# Patient Record
Sex: FEMALE | Race: WHITE | Employment: FULL TIME | URBAN - METROPOLITAN AREA
[De-identification: names, ages, dates, MRNs, and addresses within clinical notes are randomized per-mention and may not be internally consistent; named-entity substitution may affect disease eponyms.]

---

## 2017-01-30 ENCOUNTER — CLINICAL SUPPORT (OUTPATIENT)
Dept: SURGERY | Age: 37
End: 2017-01-30

## 2017-01-30 ENCOUNTER — OFFICE VISIT (OUTPATIENT)
Dept: CARDIOLOGY CLINIC | Age: 37
End: 2017-01-30

## 2017-01-30 VITALS
OXYGEN SATURATION: 98 % | HEART RATE: 76 BPM | BODY MASS INDEX: 43.4 KG/M2 | SYSTOLIC BLOOD PRESSURE: 128 MMHG | RESPIRATION RATE: 16 BRPM | WEIGHT: 293 LBS | HEIGHT: 69 IN | DIASTOLIC BLOOD PRESSURE: 80 MMHG

## 2017-01-30 VITALS — WEIGHT: 293 LBS | HEIGHT: 69 IN | BODY MASS INDEX: 43.4 KG/M2

## 2017-01-30 DIAGNOSIS — E66.01 MORBID OBESITY WITH BMI OF 45.0-49.9, ADULT (HCC): Primary | ICD-10-CM

## 2017-01-30 DIAGNOSIS — R00.2 PALPITATIONS: ICD-10-CM

## 2017-01-30 DIAGNOSIS — Z82.49 FAMILY HISTORY OF HYPERTROPHIC CARDIOMYOPATHY: ICD-10-CM

## 2017-01-30 DIAGNOSIS — Z01.810 PREOP CARDIOVASCULAR EXAM: Primary | ICD-10-CM

## 2017-01-30 DIAGNOSIS — J45.20 MILD INTERMITTENT ASTHMA WITHOUT COMPLICATION: ICD-10-CM

## 2017-01-30 DIAGNOSIS — R00.2 PALPITATIONS: Primary | ICD-10-CM

## 2017-01-30 DIAGNOSIS — E66.01 MORBID OBESITY WITH BMI OF 40.0-44.9, ADULT (HCC): ICD-10-CM

## 2017-01-30 DIAGNOSIS — R07.89 OTHER CHEST PAIN: ICD-10-CM

## 2017-01-30 RX ORDER — DEXTROAMPHETAMINE SACCHARATE, AMPHETAMINE ASPARTATE MONOHYDRATE, DEXTROAMPHETAMINE SULFATE AND AMPHETAMINE SULFATE 5; 5; 5; 5 MG/1; MG/1; MG/1; MG/1
20 CAPSULE, EXTENDED RELEASE ORAL 2 TIMES DAILY
Status: ON HOLD | COMMUNITY
End: 2017-05-26 | Stop reason: CLARIF

## 2017-01-30 RX ORDER — MONTELUKAST SODIUM 10 MG/1
10 TABLET ORAL DAILY
COMMUNITY
End: 2017-05-09

## 2017-01-30 NOTE — MR AVS SNAPSHOT
Visit Information Date & Time Provider Department Dept. Phone Encounter #  
 1/30/2017  2:40 PM Willy Bustamante MD [facility] Jeff Friend [phone] [id number] Follow-up Instructions Return if symptoms worsen or fail to improve. Your Appointments 2/27/2017  2:30 PM  
NUTRITION COUNSELING with Murtaza Tsang RD [address] ([facility]) Appt Note: supervised wt loss [address] [facility]  [address] [facility] [address]  
[phone]  
  
   
 [address] [facility] [address] [place] [address] [address] [address] Upcoming Health Maintenance Date Due DTaP/Tdap/Td series (1 - Tdap) 6/13/2001 PAP AKA CERVICAL CYTOLOGY 6/13/2001 INFLUENZA AGE 9 TO ADULT 8/1/2016 Allergies as of 1/30/2017  Review Complete On: 1/30/2017 By: Willy Bustamante MD  
 No Known Allergies Current Immunizations  Never Reviewed No immunizations on file. Not reviewed this visit You Were Diagnosed With   
  
 Codes Comments Preop cardiovascular exam    -  Primary ICD-10-CM: Z01.810 ICD-9-CM: V72.81 Palpitations     ICD-10-CM: R00.2 ICD-9-CM: 785.1 Morbid obesity with BMI of 40.0-44.9, adult (HCC)     ICD-10-CM: E66.01, Z68.41 
ICD-9-CM: 278.01, V85.41 Other chest pain     ICD-10-CM: R07.89 ICD-9-CM: 786.59 Mild intermittent asthma without complication     IZQ-03-MAE: J45.20 ICD-9-CM: 493.90 Vitals BP Pulse Resp Height(growth percentile) Weight(growth percentile) SpO2  
 128/80 (BP 1 Location: Right arm, BP Patient Position: Sitting) 76 16 5' 9\" (1.753 m) 310 lb 3.2 oz (140.7 kg) 98% BMI Smoking Status 45.81 kg/m2 Never Smoker BMI and BSA Data Body Mass Index Body Surface Area 45.81 kg/m 2 2.62 m 2 Your Updated Medication List  
  
   
This list is accurate as of: 1/30/17  3:09 PM.  Always use your most recent med list.  
  
  
  
  
 ADDERALL XR 20 mg XR capsule Generic drug:  amphetamine-dextroamphetamine XR Take 20 mg by mouth every morning.  
  
 multivitamin tablet Commonly known as:  ONE A DAY Take 1 Tab by mouth daily. SINGULAIR 10 mg tablet Generic drug:  montelukast  
Take 10 mg by mouth daily. We Performed the Following AMB POC EKG ROUTINE W/ 12 LEADS, INTER & REP [82767 CPT(R)] Follow-up Instructions Return if symptoms worsen or fail to improve. Introducing [facility] & HEALTH SERVICES! Andrea Maldonado introduces [employer] patient portal. Now you can access parts of your medical record, email your doctor's office, and request medication refills online. 1. In your internet browser, go to [email or web address] 2. Click on the First Time User? Click Here link in the Sign In box. You will see the New Member Sign Up page. 3. Enter your [employer] Access Code exactly as it appears below. You will not need to use this code after youve completed the sign-up process. If you do not sign up before the expiration date, you must request a new code. · [employer] Access Code: [email or web address] Expires: 4/30/2017  3:09 PM 
 
4. Enter the last four digits of your Social Security Number (xxxx) and Date of Birth (mm/dd/yyyy) as indicated and click Submit. You will be taken to the next sign-up page. 5. Create a [employer] ID. This will be your [employer] login ID and cannot be changed, so think of one that is secure and easy to remember. 6. Create a [employer] password. You can change your password at any time. 7. Enter your Password Reset Question and Answer. This can be used at a later time if you forget your password. 8. Enter your e-mail address. You will receive e-mail notification when new information is available in [address] 9. Click Sign Up. You can now view and download portions of your medical record. 10. Click the Download Summary menu link to download a portable copy of your medical information. If you have questions, please visit the Frequently Asked Questions section of the HeyBubblet website. Remember, [employer] is NOT to be used for urgent needs. For medical emergencies, dial 911. Now available from your iPhone and Android! Please provide this summary of care documentation to your next provider. If you have any questions after today's visit, please call [phone].

## 2017-01-30 NOTE — PROGRESS NOTES
HISTORY OF PRESENT ILLNESS  Zahida Baird is a 39 y.o. female     SUMMARY:   Problem List  Date Reviewed: 1/30/2017          Codes Class Noted    Palpitations ICD-10-CM: R00.2  ICD-9-CM: 785.1  1/30/2017        Morbid obesity with BMI of 40.0-44.9, adult [facility]) ICD-10-CM: E66.01, Z68.41  ICD-9-CM: 278.01, V85.41  9/27/2016              Current Outpatient Prescriptions on File Prior to Visit   Medication Sig    multivitamin (ONE A DAY) tablet Take 1 Tab by mouth daily. No current facility-administered medications on file prior to visit. CARDIOLOGY STUDIES TO DATE:  unknown  Chief Complaint   Patient presents with    Pre-op Exam     HPI :  Ms. Trice Rodriguez is a 39year old referred by Dr. Carmelo Middleton for pre-op cardiac evaluation. She has had problems with some palpitations off and on over the years which she has attributed to panic and anxiety, and it sounds like about 10 years ago when she was in [place] she had an event monitor and she may have had an echo, details unknown. She feels lately that her palpitations have been getting worse and more prolonged and often she wakes up at night with this. Once she calms down it seems to go away. She also has been having some shortness of breath and intermittent sharp chest pain. Her father has a history of hypertrophic cardiomyopathy and A-fib and has had an alcohol septal ablation. She gets no regular exercise. She occasionally has problems with dependent lower extremity edema. She has never smoked, there is no history of diabetes or hypertension, and cholesterol has always been good according to her.       CARDIAC ROS:   negative for syncope, orthopnea, paroxysmal nocturnal dyspnea, exertional chest pressure/discomfort    Family History   Problem Relation Age of Onset    Hypertension Mother     Heart Disease Father     Hypertension Father     Cancer Maternal Grandmother     Heart Disease Paternal Grandfather        Past Medical History   Diagnosis Date    Depression     Morbid obesity ([facility] [address]     PCOS (polycystic ovarian syndrome)        GENERAL ROS:  A comprehensive review of systems was negative except for: Gastrointestinal: positive for dysphagia  Neurological: positive for paresthesia  Behvioral/Psych: positive for anxiety and sleep disturbance    Visit Vitals    /80 (BP 1 Location: Right arm, BP Patient Position: Sitting)    Pulse 76    Resp 16    Ht 5' 9\" (1.753 m)    Wt 310 lb 3.2 oz (140.7 kg)    SpO2 98%    BMI 45.81 kg/m2       Wt Readings from Last 3 Encounters:   01/30/17 310 lb 3.2 oz (140.7 kg)   01/30/17 311 lb (141.1 kg)   12/30/16 319 lb (144.7 kg)            BP Readings from Last 3 Encounters:   01/30/17 128/80   09/27/16 120/84       PHYSICAL EXAM  General appearance: alert, cooperative, no distress, appears stated age  Neurologic: Alert and oriented X 3  Neck: supple, symmetrical, trachea midline, no adenopathy, no carotid bruit and no JVD  Lungs: clear to auscultation bilaterally  Heart: regular rate and rhythm, S1, S2 normal, no murmur, click, rub or gallop  Abdomen: morbidly obese  Extremities: extremities normal, atraumatic, no cyanosis or edema  Pulses: 2+ and symmetric      ASSESSMENT  Her EKG today shows sinus rhythm with a short NH, so I wonder if she is having some sort of supraventricular tachycardia. Certainly this is not a reason to delay her surgery, but I do think it requires some additional evaluation, and also given her history of hypertrophic cardiomyopathy she needs a screening echo. We will provide her with an event monitor as well. The chest pains she describes are clearly non-cardiac and no stress testing is required at this point. current treatment plan is effective, no change in therapy  lab results and schedule of future lab studies reviewed with patient  reviewed diet, exercise and weight control    Encounter Diagnoses   Name Primary?     Preop cardiovascular exam Yes    Palpitations     Morbid obesity with BMI of 40.0-44.9, adult (HCC)     Other chest pain     Mild intermittent asthma without complication     Family history of hypertrophic cardiomyopathy      Orders Placed This Encounter    AMB POC EKG ROUTINE W/ 12 LEADS, INTER & REP    2D ECHO COMPLETE ADULT (TTE) W OR WO CONTR    montelukast (SINGULAIR) 10 mg tablet    amphetamine-dextroamphetamine XR (ADDERALL XR) 20 mg XR capsule       Follow-up Disposition:  Return in about 4 weeks (around 2/27/2017).     Garcia Newton MD  1/31/2017

## 2017-01-30 NOTE — PROGRESS NOTES
[address] [facility]  Supervised Weight Loss     Date:   2017    Patient's Name: Magda Ba  : 1980    [facility]          Session: 5 of  6  Surgery: undecided  Surgeon:  Silvio Cali M.D. Height: 69\"   Weight:    311      Lbs. BMI: 45   Pounds Lost since last month: 8#               Pounds Gained since last month: 0    Starting Weight: 311#    Previous Months Weight: 319#  Overall Pounds Lost: 0   Overall Pounds Gained: 0    Other Pertinent Information: n/a     Smoking Status:  none  Alcohol Intake: 1-2 drinks, 0-1 times per week     I have reviewed with patient the guidelines of the supervised weight loss class. Patient understands the expectations of some weight loss during the weight loss trial.  Patient understands that weight gain could delay the process. I have also expressed to patient that classes need to be consecutive. Missing a class may subject patient to have to start their trial over. Patient has received this information in writing. Changes that patient has made since last month include:  Eliminated diet soda, eliminated restaurant meals. Eating Habits and Behaviors      Today we reviewed the general diet principles for weight loss surgery. I have talked with patient about what their plate should be made up of. Their plate should be made up of 1/2 coming from non-starchy vegetables, 1/4 coming from lean meat, and 1/4 of their plate coming from carbohydrates, including fruits, starches, or milk. Emphasis was placed on the importance of eating 3 meals a day and aiming for 60 grams of protein per day. I educated the patient on limiting liquid calories and drinking only calorie-free, sugar-free and non-carbonated beverages. We discussed the importance of drinking 64 ounces of fluid per day to prevent dehydration post-operatively.  A nutrition education lesson regarding vitamin and mineral supplements was provided and the importance of preventing nutrient deficiencies post-operatively. Patient's current diet habits include: eating 2-3 meals per day. Started using protein shakes in place of skipping breakfast. Snacking on yogurt and cheese. Eating crackers and bread almost daily and limiting sweets to once a week. Pt and  share grocery shopping and cooking. Consumes mostly baked, grilled and broiled foods. Eating out is never. Drinks 64 oz fluids daily of which 32 oz is water and 16 oz is coffee/caffeine. Reports sometimes emotional eating and denies situational eating. Eating meals while watching television and takes 15-20 minutes to finish the meal. States late night eating is her biggest challenge. Physical Activity/Exercise  During class we discussed the importance of increasing daily physical activity and beginning to develop an exercise regimen/routine. Exercise is an important part of long term weight loss. Comments:  During class, I discussed with patient the importance of getting into an exercise routine. Patient is currently not exercising stating \"cold weather\". Patient has been encouraged to increase exercise and develop an intentional exercise plan. Behavior Modification       Comments:  Patient was encouraged to keep a food journal and record what they were taking in daily. We discussed the importance of making certain behavior changes in order to be successful long term after weight loss surgery. Overall Assessment: patient demonstrates small realistic lifestyle changes over the past month evidenced by 8# wt loss and reported changes. Demonstrates understanding of general nutrition guidelines. Completed initial/first session of supervised weight loss at her original surgeon's office in [place]. Will need to complete a minimum of one more session with our program to complete 6 months total.     Goals:   1. Nutrition - increased protein foods, solid textured foods  2. Exercise - walking in my neigborhood  3.  Behavior - avoid night time snacking    Mani Blue, RD  1/30/2017

## 2017-02-27 ENCOUNTER — CLINICAL SUPPORT (OUTPATIENT)
Dept: SURGERY | Age: 37
End: 2017-02-27

## 2017-02-27 ENCOUNTER — DOCUMENTATION ONLY (OUTPATIENT)
Dept: CARDIOLOGY CLINIC | Age: 37
End: 2017-02-27

## 2017-02-27 DIAGNOSIS — E66.01 MORBID OBESITY WITH BMI OF 45.0-49.9, ADULT (HCC): Primary | ICD-10-CM

## 2017-02-27 NOTE — PROGRESS NOTES
Several loop monitor strips showed sinus tach and less likely but could be atrial flutter 2:1 AV conduction with ventricular rate 150 bpm and one 5 beat NSVT

## 2017-02-27 NOTE — PROGRESS NOTES
Patient scheduled for echo this Thursday at 3 pm, but has no f/u scheduled with Dr. Qing Gonzales. I scheduled her to see Dr. Qing Gonzales after echo. I see she also has an appointment with Dr. Venkatesh Herrera at 3:40 pm that day, which would not work. Dr. Qing Gonzales said she should keep echo appointment and see him as scheduled. He also would like patient to start a daily baby aspirin and Toprol XL 25 mg daily. I called patient and left voicemail requesting call back.     Future Appointments  Date Time Provider Toby Saleh   2/27/2017 2:30 PM Darek Glover [address]   3/2/2017 3:00 PM [facility]   3/2/2017 3:40 PM MD Alan Trimble   3/2/2017 4:20 PM Thalia Amaral  [address]

## 2017-03-01 VITALS — HEIGHT: 69 IN | BODY MASS INDEX: 43.4 KG/M2 | WEIGHT: 293 LBS

## 2017-03-01 NOTE — PROGRESS NOTES
[address] [facility] [address]  Supervised Weight Loss     Date:   3/1/2017    Patient's Name: Roopa Rose  : 1980    Insurance:  [employer]li Andres          Session:   Surgery: undecided  Surgeon:  Edwige Frausto M.D. Height: 69\"   Weight:    308      Lbs. BMI: 45   Pounds Lost since last month: 3#               Pounds Gained since last month: 0    Starting Weight: 311#   Previous Months Weight: 311#  Overall Pounds Lost: 3#  Overall Pounds Gained: 0    Other Pertinent Information: n/a     Smoking Status:  None   Alcohol Intake: 1-2 drinks, 0-1 times per week    I have reviewed with patient the guidelines of the supervised weight loss class. Patient understands the expectations of some weight loss during the weight loss trial.  Patient understands that weight gain could delay the process. I have also expressed to patient that classes need to be consecutive. Missing a class may subject patient to have to start their trial over. Patient has received this information in writing. Changes that patient has made since last month include:  None reported on questionnaire. Eating Habits and Behaviors      Today we reviewed the general diet principles for weight loss surgery. I have talked with patient about what their plate should be made up of. Their plate should be made up of 1/2 coming from non-starchy vegetables, 1/4 coming from lean meat, and 1/4 of their plate coming from carbohydrates, including fruits, starches, or milk. Emphasis was placed on the importance of eating 3 meals a day and aiming for 60 grams of protein per day. I educated the patient on limiting liquid calories and drinking only calorie-free, sugar-free and non-carbonated beverages. We discussed the importance of drinking 64 ounces of fluid per day to prevent dehydration post-operatively.                   Patient's current diet habits include: eating 3 meals per day, snacking on yogurt and cheese while in the car/on the go. Limiting refined carbohydrates and sweets to 1-2 times per week. Pt does most of the grocery shopping and cooking for the household. Eating mostly baked, grilled and broiled foods. Denies eating out. Drinking 32 oz water and 16 oz unsweetened tea daily and 16 oz coffee daily. Reports sometimes emotional eating. Denies situational eating. Packing meals when away from home. Eating most meals at a table and taking 10-15 minutes to finish the meal. States lack of activity is her biggest barrier to weight loss. Physical Activity/Exercise  During class we discussed the importance of increasing daily physical activity and beginning to develop an exercise regimen/routine. Exercise is an important part of long term weight loss. Comments:  During class, I discussed with patient the importance of getting into an exercise routine. Patient is currently walking for activity. Patient has been encouraged to increase frequency, duration and/or intensity as tolerated. Behavior Modification       Comments:  Patient was encouraged to keep a food journal and record what they were taking in daily. We discussed the importance of making certain behavior changes in order to be successful long term after weight loss surgery. An education lesson regarding mindful eating behaviors was provided. Emphasis was placed on taking smaller bites, chewing foods slowly and thoroughly, allowing 20 minutes to finish a meal and avoiding distractions while eating. We discussed emotional eating and non-food coping mechanisms. Overall Assessment: Patient demonstrates understanding of general nutrition guidelines specific to weight loss surgery evidenced by reported lifestyle changes, 3# overall weight loss, participation in group discussions and asks relevant questions. Appears to be an appropriate candidate for surgery at this time. Goals:   1.  Nutrition - eat more protein and more variety of protein options  2. Exercise - increase walking   3.  Behavior - eat at the dining room table     Suleman Duffy, [address]  3/1/2017

## 2017-03-02 ENCOUNTER — CLINICAL SUPPORT (OUTPATIENT)
Dept: CARDIOLOGY CLINIC | Age: 37
End: 2017-03-02

## 2017-03-02 ENCOUNTER — OFFICE VISIT (OUTPATIENT)
Dept: CARDIOLOGY CLINIC | Age: 37
End: 2017-03-02

## 2017-03-02 VITALS
RESPIRATION RATE: 20 BRPM | WEIGHT: 293 LBS | HEART RATE: 72 BPM | DIASTOLIC BLOOD PRESSURE: 86 MMHG | HEIGHT: 69 IN | OXYGEN SATURATION: 98 % | BODY MASS INDEX: 43.4 KG/M2 | SYSTOLIC BLOOD PRESSURE: 126 MMHG

## 2017-03-02 DIAGNOSIS — J45.20 MILD INTERMITTENT ASTHMA WITHOUT COMPLICATION: ICD-10-CM

## 2017-03-02 DIAGNOSIS — R00.2 PALPITATIONS: ICD-10-CM

## 2017-03-02 DIAGNOSIS — R00.0 SINUS TACHYCARDIA: ICD-10-CM

## 2017-03-02 DIAGNOSIS — R00.2 PALPITATIONS: Primary | ICD-10-CM

## 2017-03-02 DIAGNOSIS — I47.29 NON-SUSTAINED VENTRICULAR TACHYCARDIA: ICD-10-CM

## 2017-03-02 DIAGNOSIS — E66.01 MORBID OBESITY WITH BMI OF 40.0-44.9, ADULT (HCC): ICD-10-CM

## 2017-03-02 DIAGNOSIS — Z01.810 PREOP CARDIOVASCULAR EXAM: ICD-10-CM

## 2017-03-02 DIAGNOSIS — R07.89 OTHER CHEST PAIN: ICD-10-CM

## 2017-03-02 RX ORDER — METOPROLOL SUCCINATE 25 MG/1
TABLET, EXTENDED RELEASE ORAL
COMMUNITY
End: 2017-03-02 | Stop reason: SDUPTHER

## 2017-03-02 RX ORDER — METOPROLOL SUCCINATE 25 MG/1
25 TABLET, EXTENDED RELEASE ORAL
Qty: 30 TAB | Refills: 2 | Status: SHIPPED | OUTPATIENT
Start: 2017-03-02 | End: 2017-05-09

## 2017-03-02 NOTE — PROGRESS NOTES
Chief Complaint   Patient presents with    Palpitations     follow up visit with echocardiogram.  Cardiac clearance for bariatric surgery. States ocasional fast heartrate. Denies chest pain/swelling/shortness of breath. States occasional pain/anxiety issues.

## 2017-03-02 NOTE — MR AVS SNAPSHOT
Visit Information Date & Time Provider Department Dept. Phone Encounter #  
 3/2/2017  4:20 PM Alfredo Torres MD [facility] Saroj Tejeda [phone] [id number] Your Appointments 3/9/2017  3:40 PM  
ESTABLISHED PATIENT with MD Lenny Fitzpatrick [phone] ([address]) Appt Note: pre-WLS eval for [employer] (ltr med necessity)--SG vs RY; moved to 3/9  
 [address] [address]  
[address] [phone]  
  
    
 5/4/2017  3:20 PM  
ESTABLISHED PATIENT with Alfredo Torres MD  
[facility] ([facility]) Appt Note: two month follow up  
 [address]  
[address] [facility] 7 [address] Upcoming Health Maintenance Date Due Pneumococcal 19-64 Medium Risk (1 of 1 - PPSV23) 6/13/1999 DTaP/Tdap/Td series (1 - Tdap) 6/13/2001 PAP AKA CERVICAL CYTOLOGY 6/13/2001 INFLUENZA AGE 9 TO ADULT 8/1/2016 Allergies as of 3/2/2017  Review Complete On: 3/2/2017 By: Alfredo Torres MD  
  
 Severity Noted Reaction Type Reactions Other Food  03/02/2017    Other (comments) Walnuts and pineapples cause mouth irritation. Current Immunizations  Never Reviewed No immunizations on file. Not reviewed this visit You Were Diagnosed With   
  
 Codes Comments Palpitations    -  Primary ICD-10-CM: R00.2 ICD-9-CM: 785.1 Morbid obesity with BMI of 40.0-44.9, adult (HCC)     ICD-10-CM: E66.01, Z68.41 
ICD-9-CM: 278.01, V85.41 Preop cardiovascular exam     ICD-10-CM: Z01.810 ICD-9-CM: V72.81 Vitals BP  
  
  
  
  
  
 126/86 (BP Patient Position: Sitting) BMI and BSA Data Body Mass Index Body Surface Area  
 45.63 kg/m 2 2.61 m 2 Preferred Pharmacy Pharmacy Name Phone [facility] [address] [facility], [address] [facility] [phone] Your Updated Medication List  
  
   
This list is accurate as of: 3/2/17  4:32 PM.  Always use your most recent med list.  
  
  
  
  
 ADDERALL XR 20 mg XR capsule Generic drug:  amphetamine-dextroamphetamine XR Take 20 mg by mouth daily as needed. ALBUTEROL IN Take  by inhalation every six (6) hours as needed. AUGMENTIN PO Take  by mouth two (2) times a day. To take for 10 days for respiratory illness  
  
 metoprolol succinate 25 mg XL tablet Commonly known as:  TOPROL XL Take 1 Tab by mouth nightly. multivitamin tablet Commonly known as:  ONE A DAY Take 1 Tab by mouth daily. SINGULAIR 10 mg tablet Generic drug:  montelukast  
Take 10 mg by mouth daily. Prescriptions Sent to Pharmacy Refills  
 metoprolol succinate (TOPROL XL) 25 mg XL tablet 2 Sig: Take 1 Tab by mouth nightly. Class: Normal  
 Pharmacy: [facility] [place] [address] [facility] [address] [facility] Ph #: [phone] Route: Oral  
  
Introducing [facility] & HEALTH SERVICES! Anthony Burnett introduces [employer] patient portal. Now you can access parts of your medical record, email your doctor's office, and request medication refills online. 1. In your internet browser, go to [email or web address] 2. Click on the First Time User? Click Here link in the Sign In box. You will see the New Member Sign Up page. 3. Enter your [employer] Access Code exactly as it appears below. You will not need to use this code after youve completed the sign-up process. If you do not sign up before the expiration date, you must request a new code. · [employer] Access Code: [email or web address] Expires: 4/30/2017  3:09 PM 
 
4. Enter the last four digits of your Social Security Number (xxxx) and Date of Birth (mm/dd/yyyy) as indicated and click Submit. You will be taken to the next sign-up page. 5. Create a Dealent[employer] ID. This will be your [employer] login ID and cannot be changed, so think of one that is secure and easy to remember. 6. Create a [employer] password. You can change your password at any time. 7. Enter your Password Reset Question and Answer. This can be used at a later time if you forget your password. 8. Enter your e-mail address. You will receive e-mail notification when new information is available in [address] 9. Click Sign Up. You can now view and download portions of your medical record. 10. Click the Download Summary menu link to download a portable copy of your medical information. If you have questions, please visit the Frequently Asked Questions section of the [employer]ent[employer] website. Remember, [employer] is NOT to be used for urgent needs. For medical emergencies, dial 911. Now available from your iPhone and Android! Please provide this summary of care documentation to your next provider. Your primary care clinician is listed as Joshua Lebron. If you have any questions after today's visit, please call [phone].

## 2017-03-02 NOTE — PROGRESS NOTES
HISTORY OF PRESENT ILLNESS  Zahida Best is a 39 y.o. female     SUMMARY:   Problem List  Date Reviewed: 3/2/2017          Codes Class Noted    Palpitations ICD-10-CM: R00.2  ICD-9-CM: 785.1  1/30/2017        Morbid obesity with BMI of 40.0-44.9, adult [facility]) ICD-10-CM: E66.01, Z68.41  ICD-9-CM: 278.01, V85.41  9/27/2016              Current Outpatient Prescriptions on File Prior to Visit   Medication Sig    amphetamine-dextroamphetamine XR (ADDERALL XR) 20 mg XR capsule Take 20 mg by mouth daily as needed.  multivitamin (ONE A DAY) tablet Take 1 Tab by mouth daily.  montelukast (SINGULAIR) 10 mg tablet Take 10 mg by mouth daily. No current facility-administered medications on file prior to visit. CARDIOLOGY STUDIES TO DATE:  2/17 event monitor, sinus tach, ?aflutter, 5 beats nsvt  3/17 normal echo      Chief Complaint   Patient presents with    Palpitations     follow up visit with echocardiogram.  Cardiac clearance for bariatric surgery. States ocasional fast heartrate. Denies chest pain/swelling/shortness of breath. HPI :  Ms. Yakov Padilla is doing pretty well. She was able to wear her event monitor for about 26 or 27 days before there was so much irritation she just could not handle it. She did have some symptoms, though I have not seen the final report to see if those are correlated with any of the rhythm disturbances described in the summary above. Shortly after we met she developed bronchitis and was treated with amoxicillin and prednisone, then prednisone alone, and more recently has started on another course of Augmentin.        CARDIAC ROS:   negative for chest pain, dyspnea, syncope, orthopnea, paroxysmal nocturnal dyspnea, exertional chest pressure/discomfort, claudication, lower extremity edema    Family History   Problem Relation Age of Onset    Hypertension Mother     Heart Disease Father     Hypertension Father     Cancer Maternal Grandmother     Heart Disease Paternal Grandfather        Past Medical History:   Diagnosis Date    Depression     Morbid [facility] [address]     PCOS (polycystic ovarian syndrome)        GENERAL ROS:  A comprehensive review of systems was negative except for that written in the HPI. Visit Vitals    /86 (BP Patient Position: Sitting)    Pulse 72    Resp 20    Ht 5' 9\" (1.753 m)    Wt 309 lb (140.2 kg)    SpO2 98%    BMI 45.63 kg/m2       Wt Readings from Last 3 Encounters:   03/02/17 309 lb (140.2 kg)   03/01/17 308 lb (139.7 kg)   01/30/17 310 lb 3.2 oz (140.7 kg)            BP Readings from Last 3 Encounters:   03/02/17 126/86   01/30/17 128/80   09/27/16 120/84       PHYSICAL EXAM  General appearance: alert, cooperative, no distress, appears stated age  Neck: supple, symmetrical, trachea midline, no adenopathy, no carotid bruit and no JVD  Lungs: clear to auscultation bilaterally  Heart: regular rate and rhythm, S1, S2 normal, no murmur, click, rub or gallop  Extremities: extremities normal, atraumatic, no cyanosis or edema      ASSESSMENT  Fortunately Ms. Perez's echo is normal, and there is no evidence that she has any sort of hypertrophic cardiomyopathy which makes her event monitor findings less of a concern. That being said I still think she should touch base with Dr. Arlene Quiñones to make sure there is no further testing or issues to be resolved. Presumably some of this could have been aggravated by her recent upper respiratory infection. She is willing to take a baby aspirin a day in case she has flutter with a CHADS score of zero, and I think she should be on low dose Toprol which should be good for flutter, nonsustained v-tach and sinus tachycardia and help control some of her symptoms, though it may all be anxiety related as we discussed when we first met. I do not see any reason that she cannot undergo weight loss surgery with minimal risk and no further cardiac testing.       current treatment plan is effective, no change in therapy  lab results and schedule of future lab studies reviewed with patient  reviewed diet, exercise and weight control    Encounter Diagnoses   Name Primary?  Palpitations Yes    Morbid obesity with BMI of 40.0-44.9, adult (HCC)     Preop cardiovascular exam     Sinus tachycardia     Non-sustained ventricular tachycardia (HCC)      Orders Placed This Encounter    AMOXICILLIN/POTASSIUM CLAV (AUGMENTIN PO)    ALBUTEROL IN    DISCONTD: metoprolol succinate (TOPROL XL) 25 mg XL tablet    metoprolol succinate (TOPROL XL) 25 mg XL tablet       Follow-up Disposition:  Return in about 2 months (around 5/2/2017).     Veronica Neal MD  3/2/2017

## 2017-03-09 ENCOUNTER — OFFICE VISIT (OUTPATIENT)
Dept: SURGERY | Age: 37
End: 2017-03-09

## 2017-03-09 ENCOUNTER — TELEPHONE (OUTPATIENT)
Dept: CARDIOLOGY CLINIC | Age: 37
End: 2017-03-09

## 2017-03-09 VITALS
HEART RATE: 85 BPM | WEIGHT: 293 LBS | SYSTOLIC BLOOD PRESSURE: 130 MMHG | RESPIRATION RATE: 16 BRPM | BODY MASS INDEX: 43.4 KG/M2 | TEMPERATURE: 98.7 F | OXYGEN SATURATION: 97 % | DIASTOLIC BLOOD PRESSURE: 78 MMHG | HEIGHT: 69 IN

## 2017-03-09 DIAGNOSIS — E66.01 MORBID OBESITY WITH BMI OF 45.0-49.9, ADULT (HCC): Primary | ICD-10-CM

## 2017-03-09 DIAGNOSIS — J45.20 MILD INTERMITTENT ASTHMA WITHOUT COMPLICATION: ICD-10-CM

## 2017-03-09 DIAGNOSIS — R00.2 PALPITATIONS: ICD-10-CM

## 2017-03-09 DIAGNOSIS — E28.2 PCOS (POLYCYSTIC OVARIAN SYNDROME): ICD-10-CM

## 2017-03-09 NOTE — TELEPHONE ENCOUNTER
Patient's end of service event monitor report is on your desk for review.      Future Appointments  Date Time Provider Toby Saleh   3/9/2017 3:40 PM Lety Gill MD [place]   3/22/2017 11:00 AM Isai Ospina  [address]   5/4/2017 3:20 PM Adenike Henriquez  [address]

## 2017-03-09 NOTE — PROGRESS NOTES
1. Have you been to the ER, urgent care clinic since your last visit? Hospitalized since your last visit? Urgent care bronchitis     2. Have you seen or consulted any other health care providers outside of the [address] [place] [address] since your last visit? Include any pap smears or colon screening.   PCP

## 2017-03-10 PROBLEM — E28.2 PCOS (POLYCYSTIC OVARIAN SYNDROME): Status: ACTIVE | Noted: 2017-03-10

## 2017-03-10 PROBLEM — J45.20 MILD INTERMITTENT ASTHMA WITHOUT COMPLICATION: Status: ACTIVE | Noted: 2017-03-10

## 2017-03-10 NOTE — PROGRESS NOTES
Subjective: The patient is a 39 y.o. obese female seeking approval for bariatric surgery. Body mass index is 45.48 kg/(m^2). Josue Hammonds has tried multiple diets in her  lifetime most recently trying unsupervised diets during which she was able to lose small amounts of weight. Bariatric comorbidities present are   Past Medical History:   Diagnosis Date    Depression     Morbid obesity ([facility] [address]     PCOS (polycystic ovarian syndrome)        Patient Active Problem List    Diagnosis Date Noted    Mild intermittent asthma without complication 95/53/8558    PCOS (polycystic ovarian syndrome) 03/10/2017    Palpitations 01/30/2017    Morbid obesity with BMI of 40.0-44.9, adult ([facility] [address] 09/27/2016     Past Medical History:   Diagnosis Date    Depression     Morbid obesity ([facility] [address]     PCOS (polycystic ovarian syndrome)       Past Surgical History:   Procedure Laterality Date    HX HEENT  1985    tubes in ears as child    HX HEENT  1998    wisdom teeth removed    HX OTHER SURGICAL Bilateral 2011    Liposuction       Social History   Substance Use Topics    Smoking status: Never Smoker    Smokeless tobacco: Never Used    Alcohol use Yes      Comment: 1-2 drinks per month      Family History   Problem Relation Age of Onset    Hypertension Mother     Heart Disease Father     Hypertension Father     Cancer Maternal Grandmother     Heart Disease Paternal Grandfather       Prior to Admission medications    Medication Sig Start Date End Date Taking? Authorizing Provider   AMOXICILLIN/POTASSIUM CLAV (AUGMENTIN PO) Take  by mouth two (2) times a day. To take for 10 days for respiratory illness   Yes Historical Provider   ALBUTEROL IN Take  by inhalation every six (6) hours as needed. Yes Historical Provider   metoprolol succinate (TOPROL XL) 25 mg XL tablet Take 1 Tab by mouth nightly.  3/2/17  Yes Gaby Pritchett MD   amphetamine-dextroamphetamine XR (ADDERALL XR) 20 mg XR capsule Take 20 mg by mouth daily as needed. Yes Historical Provider   multivitamin (ONE A DAY) tablet Take 1 Tab by mouth daily. Yes Historical Provider   montelukast (SINGULAIR) 10 mg tablet Take 10 mg by mouth daily. Historical Provider     Allergies   Allergen Reactions    Other Food Other (comments)     Walnuts and pineapples cause mouth irritation. Objective:     Visit Vitals    /78    Pulse 85    Temp 98.7 °F (37.1 °C)    Resp 16    Ht 5' 9\" (1.753 m)    Wt 308 lb (139.7 kg)    SpO2 97%    BMI 45.48 kg/m2       Assessment:     Morbid obesity with comorbidities; no success with medical management. She has completed preoperative prerequisites and I believe she is an excellent candidate for weight loss surgery. We discussed surgical options and she prefers to proceed with sleeve gastrectomy. Given her BMI and comorbidities, I believe she will do well with sleeve gastrectomy. Plan:     1. Continue diet/exercise regimen. 2. Will submit for pre-authorization for laparoscopic sleeve gastrectomy with upper endoscopy. 25 minutes spent with patient (greater than 50% of time in face-face consultation reviewing surgical options, potential risks, anticipated hospital course and anticipated weight loss results).       Signed By: Arun Gar MD     March 10, 2017

## 2017-03-10 NOTE — PATIENT INSTRUCTIONS
Learning About How to Prepare for Weight-Loss Surgery  How can you prepare for weight-loss surgery? Having weight-loss surgery (also called bariatric surgery) is a big step. You can prepare for surgery by having a plan. Your plan may include your goals for losing weight and how to makes changes in your diet, activity, and lifestyle to help raise your chances of success. One way to prepare for surgery is to think about your goal or reason why you want to reach a healthy weight. Do you want to lower your blood pressure, cholesterol, or blood sugar? Do you want to be able to sleep better, play with your kids, or walk around the block? Having a reason can help you stay with your plan and meet your goals. Your weight-loss surgery team can help you meet your goals and get ready for surgery. Mor Walker work with a team that's trained to help you lose weight and make healthy changes in your life. This team may include:  · A medical doctor or nurse to help manage your care and schedule tests before surgery. · A surgeon who specializes in weight-loss surgery. · A registered dietitian to help you plan meals and make changes in the way you eat. · An exercise specialist to help you be more active and get stronger. · A therapist or counselor to help you learn why you eat and teach you ways to deal with stress and your emotions. Your team will also be there to help you prepare for life after surgery. They will help you adjust to new ways of eating and changes to your body. How will weight-loss surgery affect your life? You have likely thought a lot about how surgery may affect your life--how you will eat, how your body will look, or how you will feel. Some people feel overwhelmed with these changes. But planning can help you prepare for the changes and meet your weight-loss goals. One important step in your plan is to learn about the ways surgery will affect your life. These may include:  · A slimmer you.  You probably will lose weight very quickly in the first few months after surgery. As time goes on, your weight loss will slow down. How much weight you lose depends on what type of surgery you had and how well your new eating and activity plans are working for you. · A new way of eating. Success in reaching and keeping a healthy weight depends on making lifelong changes in how you eat. After surgery, you raise your chances of success if you:  ¨ Eat just a few ounces of food at a time. ¨ Eat very slowly and chew your food to mush. ¨ Don't drink for 30 minutes before you eat, during your meal, and for 30 minutes after you eat. ¨ Are careful about drinking alcohol. ¨ Avoid foods that are high in fat or sugar. ¨ Take vitamin and mineral supplements. · A healthier you. Weight-loss surgery can have some real health benefits. Problems like diabetes, high blood pressure, and sleep apnea may go away--or at least become easier to manage. · A more active you. After surgery, being active on most days of the week will help you reach your weight goal and avoid gaining back the weight you lose. · A lot of extra skin. When you lose weight quickly, you may have a lot of extra skin. That's normal. You can have surgery to remove the extra skin if it bothers you. There are going to be some ups and downs while you get used to these changes. So another way to adjust is to identify who can help support you. Getting support from friends and family can help. And joining a support group for people who have had the surgery can be a big help too, because they know what you're going through. As you know, it's a big decision to have weight-loss surgery. But when you have a plan, you can focus on losing weight and living a healthier life. So what steps can you take to prepare for weight-loss surgery? Will you set some goals? Will you learn about how surgery can affect your life? How about asking family or friends for help? Write out your plan.  Then get ready. Where can you learn more? Go to http://jessee-gonzalo.info/. Enter K132 in the search box to learn more about \"Learning About How to Prepare for Weight-Loss Surgery. \"  Current as of: February 16, 2016  Content Version: 11.1  © 1340-6543 [employer]. Care instructions adapted under license by [employer] (which disclaims liability or warranty for this information). If you have questions about a medical condition or this instruction, always ask your healthcare professional. [address] any warranty or liability for your use of this information.

## 2017-03-22 ENCOUNTER — TELEPHONE (OUTPATIENT)
Dept: CARDIOLOGY CLINIC | Age: 37
End: 2017-03-22

## 2017-03-22 NOTE — TELEPHONE ENCOUNTER
Patient was scheduled to see Dr. Nathalia Hardy today, but had to leave. Betty asked me to call her and let her know the correct day of f/u that was rescheduled is April 7th at 8 am and to answer any of patient's questions/address concerns. Called patient. Verified patient's identity with two identifiers. She states she had to leave today to get back to work. Reassured her of echo results and that the appointment with Dr. Nathalia Hardy was not urgent. I gave her the correct appointment date/time. She asked if Dr. Nathalia Hardy or his nurse could call her. She states she wants to discuss her results with them. I told her I would message his nurse. Patient verbalizes understanding and denies further questions or concerns.

## 2017-03-29 NOTE — TELEPHONE ENCOUNTER
Verified patient with two types of identifiers. States that she would like to make sure she needs this appointment and wanted to also see if we had anything sooner. Notified her that it looks like Dr Hawa Moore just wanted Dr Lilo Regalado to review everything to make sure that Dr Lilo Regalado would not need to do anything differently. Made appt for sooner on 4/5/17 with Dr Lilo Regalado.

## 2017-05-09 ENCOUNTER — HOSPITAL ENCOUNTER (OUTPATIENT)
Dept: PREADMISSION TESTING | Age: 37
Discharge: HOME | End: 2017-05-09
Payer: COMMERCIAL

## 2017-05-09 ENCOUNTER — HOSPITAL ENCOUNTER (OUTPATIENT)
Dept: GENERAL RADIOLOGY | Age: 37
Discharge: HOME | End: 2017-05-09
Payer: COMMERCIAL

## 2017-05-09 ENCOUNTER — OFFICE VISIT (OUTPATIENT)
Dept: SURGERY | Age: 37
End: 2017-05-09

## 2017-05-09 VITALS
HEIGHT: 69 IN | HEART RATE: 92 BPM | SYSTOLIC BLOOD PRESSURE: 113 MMHG | WEIGHT: 293 LBS | BODY MASS INDEX: 43.4 KG/M2 | TEMPERATURE: 98.1 F | DIASTOLIC BLOOD PRESSURE: 80 MMHG

## 2017-05-09 VITALS
BODY MASS INDEX: 43.4 KG/M2 | OXYGEN SATURATION: 99 % | TEMPERATURE: 98.1 F | HEART RATE: 92 BPM | SYSTOLIC BLOOD PRESSURE: 113 MMHG | WEIGHT: 293 LBS | DIASTOLIC BLOOD PRESSURE: 80 MMHG | RESPIRATION RATE: 18 BRPM | HEIGHT: 69 IN

## 2017-05-09 VITALS
SYSTOLIC BLOOD PRESSURE: 113 MMHG | HEART RATE: 92 BPM | TEMPERATURE: 98.1 F | OXYGEN SATURATION: 99 % | RESPIRATION RATE: 18 BRPM | HEIGHT: 69 IN | WEIGHT: 293 LBS | DIASTOLIC BLOOD PRESSURE: 80 MMHG | BODY MASS INDEX: 43.4 KG/M2

## 2017-05-09 DIAGNOSIS — E28.2 PCOS (POLYCYSTIC OVARIAN SYNDROME): ICD-10-CM

## 2017-05-09 DIAGNOSIS — J45.20 MILD INTERMITTENT ASTHMA WITHOUT COMPLICATION: ICD-10-CM

## 2017-05-09 DIAGNOSIS — Z01.818 PRE-OP EXAM: ICD-10-CM

## 2017-05-09 DIAGNOSIS — E66.01 MORBID OBESITY WITH BMI OF 40.0-44.9, ADULT (HCC): Primary | ICD-10-CM

## 2017-05-09 LAB
25(OH)D3 SERPL-MCNC: 16 NG/ML (ref 30–100)
ALBUMIN SERPL BCP-MCNC: 4.1 G/DL (ref 3.5–5)
ALBUMIN/GLOB SERPL: 1.3 {RATIO} (ref 1.1–2.2)
ALP SERPL-CCNC: 56 U/L (ref 45–117)
ALT SERPL-CCNC: 18 U/L (ref 12–78)
ANION GAP BLD CALC-SCNC: 7 MMOL/L (ref 5–15)
APPEARANCE UR: ABNORMAL
ARTERIAL PATENCY WRIST A: YES
AST SERPL W P-5'-P-CCNC: 9 U/L (ref 15–37)
BACTERIA URNS QL MICRO: ABNORMAL /HPF
BASE EXCESS BLD CALC-SCNC: 0 MMOL/L
BASOPHILS # BLD AUTO: 0 K/UL (ref 0–0.1)
BASOPHILS # BLD: 0 % (ref 0–1)
BDY SITE: NORMAL
BILIRUB SERPL-MCNC: 0.4 MG/DL (ref 0.2–1)
BILIRUB UR QL: NEGATIVE
BUN SERPL-MCNC: 14 MG/DL (ref 6–20)
BUN/CREAT SERPL: 18 (ref 12–20)
CALCIUM SERPL-MCNC: 9.2 MG/DL (ref 8.5–10.1)
CHLORIDE SERPL-SCNC: 104 MMOL/L (ref 97–108)
CHOLEST SERPL-MCNC: 178 MG/DL
CO2 SERPL-SCNC: 28 MMOL/L (ref 21–32)
COLOR UR: ABNORMAL
CREAT SERPL-MCNC: 0.78 MG/DL (ref 0.55–1.02)
CRP SERPL-MCNC: 0.73 MG/DL (ref 0–0.6)
EOSINOPHIL # BLD: 0.1 K/UL (ref 0–0.4)
EOSINOPHIL NFR BLD: 1 % (ref 0–7)
EPITH CASTS URNS QL MICRO: ABNORMAL /LPF
ERYTHROCYTE [DISTWIDTH] IN BLOOD BY AUTOMATED COUNT: 13 % (ref 11.5–14.5)
GAS FLOW.O2 O2 DELIVERY SYS: NORMAL L/MIN
GLOBULIN SER CALC-MCNC: 3.1 G/DL (ref 2–4)
GLUCOSE SERPL-MCNC: 101 MG/DL (ref 65–100)
GLUCOSE UR STRIP.AUTO-MCNC: NEGATIVE MG/DL
HCO3 BLD-SCNC: 24.8 MMOL/L (ref 22–26)
HCT VFR BLD AUTO: 38 % (ref 35–47)
HGB BLD-MCNC: 13 G/DL (ref 11.5–16)
HGB UR QL STRIP: NEGATIVE
KETONES UR QL STRIP.AUTO: ABNORMAL MG/DL
LEUKOCYTE ESTERASE UR QL STRIP.AUTO: NEGATIVE
LYMPHOCYTES # BLD AUTO: 31 % (ref 12–49)
LYMPHOCYTES # BLD: 2.4 K/UL (ref 0.8–3.5)
MAGNESIUM SERPL-MCNC: 2 MG/DL (ref 1.6–2.4)
MCH RBC QN AUTO: 29.5 PG (ref 26–34)
MCHC RBC AUTO-ENTMCNC: 34.2 G/DL (ref 30–36.5)
MCV RBC AUTO: 86.4 FL (ref 80–99)
MONOCYTES # BLD: 0.6 K/UL (ref 0–1)
MONOCYTES NFR BLD AUTO: 7 % (ref 5–13)
MUCOUS THREADS URNS QL MICRO: ABNORMAL /LPF
NEUTS SEG # BLD: 4.8 K/UL (ref 1.8–8)
NEUTS SEG NFR BLD AUTO: 61 % (ref 32–75)
NITRITE UR QL STRIP.AUTO: NEGATIVE
PCO2 BLD: 40.2 MMHG (ref 35–45)
PH BLD: 7.4 [PH] (ref 7.35–7.45)
PH UR STRIP: 5.5 [PH] (ref 5–8)
PLATELET # BLD AUTO: 322 K/UL (ref 150–400)
PO2 BLD: 94 MMHG (ref 80–100)
POTASSIUM SERPL-SCNC: 4.1 MMOL/L (ref 3.5–5.1)
PROT SERPL-MCNC: 7.2 G/DL (ref 6.4–8.2)
PROT UR STRIP-MCNC: ABNORMAL MG/DL
RBC # BLD AUTO: 4.4 M/UL (ref 3.8–5.2)
RBC #/AREA URNS HPF: ABNORMAL /HPF (ref 0–5)
SAO2 % BLD: 97 % (ref 92–97)
SODIUM SERPL-SCNC: 139 MMOL/L (ref 136–145)
SP GR UR REFRACTOMETRY: 1.03 (ref 1–1.03)
SPECIMEN TYPE: NORMAL
T4 FREE SERPL-MCNC: 1 NG/DL (ref 0.8–1.5)
TSH SERPL DL<=0.05 MIU/L-ACNC: 2.06 UIU/ML (ref 0.36–3.74)
UA: UC IF INDICATED,UAUC: ABNORMAL
UROBILINOGEN UR QL STRIP.AUTO: 0.2 EU/DL (ref 0.2–1)
WBC # BLD AUTO: 7.9 K/UL (ref 3.6–11)
WBC URNS QL MICRO: ABNORMAL /HPF (ref 0–4)

## 2017-05-09 PROCEDURE — 87086 URINE CULTURE/COLONY COUNT: CPT | Performed by: SURGERY

## 2017-05-09 PROCEDURE — 80053 COMPREHEN METABOLIC PANEL: CPT | Performed by: SURGERY

## 2017-05-09 PROCEDURE — 84439 ASSAY OF FREE THYROXINE: CPT | Performed by: SURGERY

## 2017-05-09 PROCEDURE — 71020 XR CHEST PA LAT: CPT

## 2017-05-09 PROCEDURE — 36415 COLL VENOUS BLD VENIPUNCTURE: CPT | Performed by: SURGERY

## 2017-05-09 PROCEDURE — 85025 COMPLETE CBC W/AUTO DIFF WBC: CPT | Performed by: SURGERY

## 2017-05-09 PROCEDURE — 84443 ASSAY THYROID STIM HORMONE: CPT | Performed by: SURGERY

## 2017-05-09 PROCEDURE — 82803 BLOOD GASES ANY COMBINATION: CPT

## 2017-05-09 PROCEDURE — 83735 ASSAY OF MAGNESIUM: CPT | Performed by: SURGERY

## 2017-05-09 PROCEDURE — 82465 ASSAY BLD/SERUM CHOLESTEROL: CPT | Performed by: SURGERY

## 2017-05-09 PROCEDURE — 82306 VITAMIN D 25 HYDROXY: CPT | Performed by: SURGERY

## 2017-05-09 PROCEDURE — 81001 URINALYSIS AUTO W/SCOPE: CPT | Performed by: SURGERY

## 2017-05-09 PROCEDURE — 36600 WITHDRAWAL OF ARTERIAL BLOOD: CPT

## 2017-05-09 PROCEDURE — 86140 C-REACTIVE PROTEIN: CPT | Performed by: SURGERY

## 2017-05-09 RX ORDER — IBUPROFEN 200 MG
200 TABLET ORAL
COMMUNITY
End: 2017-05-26

## 2017-05-09 RX ORDER — ERGOCALCIFEROL 1.25 MG/1
50000 CAPSULE ORAL
Qty: 8 CAP | Refills: 2 | Status: SHIPPED | OUTPATIENT
Start: 2017-05-12 | End: 2017-10-03 | Stop reason: DRUGHIGH

## 2017-05-09 RX ORDER — HYOSCYAMINE SULFATE 0.12 MG/1
0.12 TABLET SUBLINGUAL
Qty: 30 TAB | Refills: 0 | Status: SHIPPED | OUTPATIENT
Start: 2017-05-09

## 2017-05-09 RX ORDER — ONDANSETRON 4 MG/1
4 TABLET, ORALLY DISINTEGRATING ORAL
Qty: 30 TAB | Refills: 0 | Status: SHIPPED | OUTPATIENT
Start: 2017-05-09 | End: 2018-02-01 | Stop reason: ALTCHOICE

## 2017-05-09 RX ORDER — PHENOL/SODIUM PHENOLATE
20 AEROSOL, SPRAY (ML) MUCOUS MEMBRANE DAILY
Qty: 30 TAB | Refills: 5 | Status: SHIPPED | OUTPATIENT
Start: 2017-05-09

## 2017-05-09 NOTE — PROGRESS NOTES
HISTORY OF PRESENT ILLNESS  History and Physical.     The patient is a 39 y.o. obese female here for history and physical for Sleeve gastrectomy surgery. Body mass index is 43.86 kg/(m^2). Patient has an advanced directive: NO        Ms. Castro Marquez has a reminder for a \"due or due soon\" health maintenance. I have asked that she contact her primary care provider for follow-up on this health maintenance. Patient Active Problem List    Diagnosis Date Noted    Mild intermittent asthma without complication 38/40/9487    PCOS (polycystic ovarian syndrome) 03/10/2017    Palpitations 01/30/2017    Morbid obesity with BMI of 40.0-44.9, adult ([facility] [address] 09/27/2016     Past Medical History:   Diagnosis Date    Anxiety     Asthma     ATHLETIC INDUCED ASTHMA    Depression     Morbid obesity ([facility] [address]     PCOS (polycystic ovarian syndrome)       Past Surgical History:   Procedure Laterality Date    HX HEENT  1985    tubes in ears as child    HX HEENT  1998    wisdom teeth removed    HX OTHER SURGICAL Bilateral 2011    Liposuction       Social History   Substance Use Topics    Smoking status: Never Smoker    Smokeless tobacco: Never Used    Alcohol use Yes      Comment: 1-2 drinks per month      Family History   Problem Relation Age of Onset    Hypertension Mother     Ulcerative Colitis Mother     Heart Disease Father     Hypertension Father     Heart Surgery Father      AORTIC VALVE REPLACED AND ABLATION, HISTORY OF AFIB    Cancer Maternal Grandmother      BREAST CANCER    Heart Disease Paternal Grandfather     Anesth Problems Neg Hx       Prior to Admission medications    Medication Sig Start Date End Date Taking? Authorizing Provider   amphetamine-dextroamphetamine XR (ADDERALL XR) 20 mg XR capsule Take 20 mg by mouth two (2) times a day. Yes Historical Provider   multivitamin (ONE A DAY) tablet Take 1 Tab by mouth daily.    Yes Historical Provider   ibuprofen (ADVIL) 200 mg tablet Take 200 mg by mouth every six (6) hours as needed for Pain. Historical Provider     Allergies   Allergen Reactions    Other Food Other (comments)     Walnuts and pineapples cause mouth irritation. HPI    Review of Systems   Constitutional: Negative for chills, fever and malaise/fatigue. HENT: Positive for congestion (getting over a cold) and sore throat. Negative for ear discharge and tinnitus. Eyes: Positive for blurred vision (wears glasses). Negative for double vision, pain, discharge and redness. Respiratory: Positive for cough. Negative for sputum production, shortness of breath and wheezing. Cardiovascular: Negative for chest pain, palpitations and leg swelling. Gastrointestinal: Negative for constipation, diarrhea, heartburn, nausea and vomiting. Genitourinary: Negative for dysuria, frequency and urgency. Musculoskeletal: Positive for back pain (lower back). Negative for joint pain and neck pain. Skin: Negative for itching and rash. Neurological: Positive for headaches (occular migraines recently). Negative for dizziness, tingling and seizures. Psychiatric/Behavioral: Depression: hx of depression. she wa on Celexa, Lexapro and Citalopram in the past. Currently weened off. Anxious       Physical Exam   Constitutional: She is oriented to person, place, and time. She appears well-developed and well-nourished. HENT:   Head: Normocephalic. Neck: Normal range of motion. Neck supple. Cardiovascular: Normal rate and regular rhythm. Exam reveals no gallop and no friction rub. No murmur heard. Pulmonary/Chest: Effort normal and breath sounds normal.   Abdominal: Soft. Bowel sounds are normal. She exhibits no distension. There is no tenderness. No masses or hernias noted. Neurological: She is alert and oriented to person, place, and time. Skin: Skin is warm and dry. Psychiatric:   Tearful.         ASSESSMENT and PLAN    Assessment:     Morbid obesity with body mass index of 37. Co-morbids listed above    Plan:   Patient is schedule for Sleeve Gastrectomy with  on 5/25/2017 at [address] patient in regard to post diet restrictions, follow- up office visits, follow- up care. Patient as received educational booklet and vitamin list. E-prescribed post-op medications:Levsin, Prilosec and Zofran. Reviewed liver shrinking diet. Patient is anxious about the next 2 weeks before surgery. She has been on Celexa, Lexapro, xanax and Citalopram in the past. She is off all of these currently. Discussed the above with Dr. Monica Salinas. Recommended Following up with her PCP and starting back on her of her medication for anxiety. 30 minutes spent face to face with patien, >50% of time spent counseling. Pt verbalized understanding and questions were answered to the best of my knowledge and ability.           Signed By: Nithya Barahona NP     May 9, 2017

## 2017-05-09 NOTE — PROGRESS NOTES
1. Have you been to the ER, urgent care clinic since your last visit? Hospitalized since your last visit?no    2. Have you seen or consulted any other health care providers outside of the [address] [place] [address] since your last visit? Include any pap smears or colon screening.  no

## 2017-05-09 NOTE — PROGRESS NOTES
1. Have you been to the ER, urgent care clinic since your last visit? Hospitalized since your last visit? No    2. Have you seen or consulted any other health care providers outside of the [address] [place] [address] since your last visit? Include any pap smears or colon screening.  No

## 2017-05-09 NOTE — PATIENT INSTRUCTIONS
Learning About How to Prepare for Weight-Loss Surgery  How can you prepare for weight-loss surgery? Having weight-loss surgery (also called bariatric surgery) is a big step. You can prepare for surgery by having a plan. Your plan may include your goals for losing weight and how to makes changes in your diet, activity, and lifestyle to help raise your chances of success. One way to prepare for surgery is to think about your goal or reason why you want to reach a healthy weight. Do you want to lower your blood pressure, cholesterol, or blood sugar? Do you want to be able to sleep better, play with your kids, or walk around the block? Having a reason can help you stay with your plan and meet your goals. Your weight-loss surgery team can help you meet your goals and get ready for surgery. Stephen Chawla work with a team that's trained to help you lose weight and make healthy changes in your life. This team may include:  · A medical doctor or nurse to help manage your care and schedule tests before surgery. · A surgeon who specializes in weight-loss surgery. · A registered dietitian to help you plan meals and make changes in the way you eat. · An exercise specialist to help you be more active and get stronger. · A therapist or counselor to help you learn why you eat and teach you ways to deal with stress and your emotions. Your team will also be there to help you prepare for life after surgery. They will help you adjust to new ways of eating and changes to your body. How will weight-loss surgery affect your life? You have likely thought a lot about how surgery may affect your life--how you will eat, how your body will look, or how you will feel. Some people feel overwhelmed with these changes. But planning can help you prepare for the changes and meet your weight-loss goals. One important step in your plan is to learn about the ways surgery will affect your life. These may include:  · A slimmer you.  You probably will lose weight very quickly in the first few months after surgery. As time goes on, your weight loss will slow down. How much weight you lose depends on what type of surgery you had and how well your new eating and activity plans are working for you. · A new way of eating. Success in reaching and keeping a healthy weight depends on making lifelong changes in how you eat. After surgery, you raise your chances of success if you:  ¨ Eat just a few ounces of food at a time. ¨ Eat very slowly and chew your food to mush. ¨ Don't drink for 30 minutes before you eat, during your meal, and for 30 minutes after you eat. ¨ Are careful about drinking alcohol. ¨ Avoid foods that are high in fat or sugar. ¨ Take vitamin and mineral supplements. · A healthier you. Weight-loss surgery can have some real health benefits. Problems like diabetes, high blood pressure, and sleep apnea may go away--or at least become easier to manage. · A more active you. After surgery, being active on most days of the week will help you reach your weight goal and avoid gaining back the weight you lose. · A lot of extra skin. When you lose weight quickly, you may have a lot of extra skin. That's normal. You can have surgery to remove the extra skin if it bothers you. There are going to be some ups and downs while you get used to these changes. So another way to adjust is to identify who can help support you. Getting support from friends and family can help. And joining a support group for people who have had the surgery can be a big help too, because they know what you're going through. As you know, it's a big decision to have weight-loss surgery. But when you have a plan, you can focus on losing weight and living a healthier life. So what steps can you take to prepare for weight-loss surgery? Will you set some goals? Will you learn about how surgery can affect your life? How about asking family or friends for help? Write out your plan.  Then get ready. Where can you learn more? Go to http://jessee-gonzalo.info/. Enter I803 in the search box to learn more about \"Learning About How to Prepare for Weight-Loss Surgery. \"  Current as of: October 13, 2016  Content Version: 11.2  © 1793-7042 [employer]. Care instructions adapted under license by [employer] (which disclaims liability or warranty for this information). If you have questions about a medical condition or this instruction, always ask your healthcare professional. [address] any warranty or liability for your use of this information.

## 2017-05-09 NOTE — PERIOP NOTES
PATIENT GIVEN SURGICAL SITE INFECTION FAQ HANDOUT AND HAND WASHING TIP SHEET. PREOP INSTRUCTIONS REVIEWED AND PATIENT VERBALIZES UNDERSTANDING OF INSTRUCTIONS. PATIENT HAS BEEN GIVEN THE OPPORTUNITY TO ASK ADDITIONAL QUESTIONS. GAVE 2 PACKAGES OF CHG WIPES AND INSTRUCTIONS. ABG AND CHEST XRAY AT [facility].

## 2017-05-09 NOTE — PROGRESS NOTES
Subjective: The patient is a 39 y.o. obese female scheduled for LSG on 5/25. Body mass index is 43.86 kg/(m^2). Kian Nielsen has tried multiple diets in her  lifetime most recently trying unsupervised diets during which she was able to lose small amounts of weight. Bariatric comorbidities present are   Past Medical History:   Diagnosis Date    Anxiety     Asthma     ATHLETIC INDUCED ASTHMA    Depression     Morbid obesity ([facility] [address]     PCOS (polycystic ovarian syndrome)        Patient Active Problem List    Diagnosis Date Noted    Mild intermittent asthma without complication 26/15/8274    PCOS (polycystic ovarian syndrome) 03/10/2017    Palpitations 01/30/2017    Morbid obesity with BMI of 40.0-44.9, adult ([facility] 09/27/2016     Past Medical History:   Diagnosis Date    Anxiety     Asthma     ATHLETIC INDUCED ASTHMA    Depression     Morbid obesity ([facility]     PCOS (polycystic ovarian syndrome)       Past Surgical History:   Procedure Laterality Date    HX HEENT  1985    tubes in ears as child    HX HEENT  1998    wisdom teeth removed    HX OTHER SURGICAL Bilateral 2011    Liposuction       Social History   Substance Use Topics    Smoking status: Never Smoker    Smokeless tobacco: Never Used    Alcohol use Yes      Comment: 1-2 drinks per month      Family History   Problem Relation Age of Onset    Hypertension Mother     Ulcerative Colitis Mother     Heart Disease Father     Hypertension Father     Heart Surgery Father      AORTIC VALVE REPLACED AND ABLATION, HISTORY OF AFIB    Cancer Maternal Grandmother      BREAST CANCER    Heart Disease Paternal Grandfather     Anesth Problems Neg Hx       Prior to Admission medications    Medication Sig Start Date End Date Taking? Authorizing Provider   ibuprofen (ADVIL) 200 mg tablet Take 200 mg by mouth every six (6) hours as needed for Pain.    Yes Historical Provider   amphetamine-dextroamphetamine XR (ADDERALL XR) 20 mg XR capsule Take 20 mg by mouth two (2) times a day. Yes Historical Provider   multivitamin (ONE A DAY) tablet Take 1 Tab by mouth daily. Yes Historical Provider   hyoscyamine SL (LEVSIN/SL) 0.125 mg SL tablet 1 Tab by SubLINGual route every four (4) hours as needed for Cramping. 5/9/17   Mary Vidales NP   ondansetron (ZOFRAN ODT) 4 mg disintegrating tablet Take 1 Tab by mouth every eight (8) hours as needed for Nausea. 5/9/17   Mary Vidales NP   Omeprazole delayed release (PRILOSEC D/R) 20 mg tablet Take 1 Tab by mouth daily. 5/9/17   Mary Vidales NP   ergocalciferol (ERGOCALCIFEROL) 50,000 unit capsule Take 1 Cap by mouth every Monday and Friday. 5/12/17   Mary Vidales NP     Allergies   Allergen Reactions    Other Food Other (comments)     Walnuts and pineapples cause mouth irritation. Objective:     Visit Vitals    /80    Pulse 92    Temp 98.1 °F (36.7 °C)    Resp 18    Ht 5' 9\" (1.753 m)    Wt 297 lb (134.7 kg)    LMP  (Within Weeks)    SpO2 99%    BMI 43.86 kg/m2       Lab Review:    Recent Results (from the past 24 hour(s))   C REACTIVE PROTEIN, QT    Collection Time: 05/09/17 10:00 AM   Result Value Ref Range    C-Reactive protein 0.73 (H) 0.00 - 0.60 mg/dL   CBC WITH AUTOMATED DIFF    Collection Time: 05/09/17 10:00 AM   Result Value Ref Range    WBC 7.9 3.6 - 11.0 K/uL    RBC 4.40 3.80 - 5.20 M/uL    HGB 13.0 11.5 - 16.0 g/dL    HCT 38.0 35.0 - 47.0 %    MCV 86.4 80.0 - 99.0 FL    MCH 29.5 26.0 - 34.0 PG    MCHC 34.2 30.0 - 36.5 g/dL    RDW 13.0 11.5 - 14.5 %    PLATELET 035 096 - 071 K/uL    NEUTROPHILS 61 32 - 75 %    LYMPHOCYTES 31 12 - 49 %    MONOCYTES 7 5 - 13 %    EOSINOPHILS 1 0 - 7 %    BASOPHILS 0 0 - 1 %    ABS. NEUTROPHILS 4.8 1.8 - 8.0 K/UL    ABS. LYMPHOCYTES 2.4 0.8 - 3.5 K/UL    ABS. MONOCYTES 0.6 0.0 - 1.0 K/UL    ABS. EOSINOPHILS 0.1 0.0 - 0.4 K/UL    ABS.  BASOPHILS 0.0 0.0 - 0.1 K/UL   CHOLESTEROL, TOTAL    Collection Time: 05/09/17 10:00 AM   Result Value Ref Range    Cholesterol, total 178 <200 MG/DL   MAGNESIUM    Collection Time: 05/09/17 10:00 AM   Result Value Ref Range    Magnesium 2.0 1.6 - 2.4 mg/dL   METABOLIC PANEL, COMPREHENSIVE    Collection Time: 05/09/17 10:00 AM   Result Value Ref Range    Sodium 139 136 - 145 mmol/L    Potassium 4.1 3.5 - 5.1 mmol/L    Chloride 104 97 - 108 mmol/L    CO2 28 21 - 32 mmol/L    Anion gap 7 5 - 15 mmol/L    Glucose 101 (H) 65 - 100 mg/dL    BUN 14 6 - 20 MG/DL    Creatinine 0.78 0.55 - 1.02 MG/DL    BUN/Creatinine ratio 18 12 - 20      GFR est AA >60 >60 ml/min/1.73m2    GFR est non-AA >60 >60 ml/min/1.73m2    Calcium 9.2 8.5 - 10.1 MG/DL    Bilirubin, total 0.4 0.2 - 1.0 MG/DL    ALT (SGPT) 18 12 - 78 U/L    AST (SGOT) 9 (L) 15 - 37 U/L    Alk.  phosphatase 56 45 - 117 U/L    Protein, total 7.2 6.4 - 8.2 g/dL    Albumin 4.1 3.5 - 5.0 g/dL    Globulin 3.1 2.0 - 4.0 g/dL    A-G Ratio 1.3 1.1 - 2.2     T4, FREE    Collection Time: 05/09/17 10:00 AM   Result Value Ref Range    T4, Free 1.0 0.8 - 1.5 NG/DL   TSH 3RD GENERATION    Collection Time: 05/09/17 10:00 AM   Result Value Ref Range    TSH 2.06 0.36 - 3.74 uIU/mL   URINALYSIS W/ REFLEX CULTURE    Collection Time: 05/09/17 10:00 AM   Result Value Ref Range    Color DARK YELLOW      Appearance CLOUDY (A) CLEAR      Specific gravity 1.029 1.003 - 1.030      pH (UA) 5.5 5.0 - 8.0      Protein TRACE (A) NEG mg/dL    Glucose NEGATIVE  NEG mg/dL    Ketone TRACE (A) NEG mg/dL    Bilirubin NEGATIVE  NEG      Blood NEGATIVE  NEG      Urobilinogen 0.2 0.2 - 1.0 EU/dL    Nitrites NEGATIVE  NEG      Leukocyte Esterase NEGATIVE  NEG      WBC 5-10 0 - 4 /hpf    RBC 0-5 0 - 5 /hpf    Epithelial cells MANY (A) FEW /lpf    Bacteria 1+ (A) NEG /hpf    UA:UC IF INDICATED URINE CULTURE ORDERED (A) CNI      Mucus 2+ (A) NEG /lpf   VITAMIN D, 25 HYDROXY    Collection Time: 05/09/17 10:00 AM   Result Value Ref Range    Vitamin D 25-Hydroxy 16.0 (L) 30 - 100 ng/mL   POC G3 - PUL    Collection Time: 05/09/17 11:17 AM   Result Value Ref Range    pH (POC) 7.399 7.35 - 7.45      pCO2 (POC) 40.2 35.0 - 45.0 MMHG    pO2 (POC) 94 80 - 100 MMHG    HCO3 (POC) 24.8 22 - 26 MMOL/L    sO2 (POC) 97 92 - 97 %    Base excess (POC) 0 mmol/L    Site LEFT RADIAL      Device: ROOM AIR      Allens test (POC) YES      Specimen type (POC) ARTERIAL         Assessment:     Morbid obesity comorbidities; no success with medical management. Plan:     laparoscopic sleeve gastrectomy    All of her questions have been answered and she has signed a consent for this operation following review of the detailed informed consent document. She was counseled on the 2 week preoperative liver shrinking diet. The postoperative dietary changes and discharge information has been given to her in the form of a booklet. 25 minute spent with patient (greater than 50% of time in face-face consultation reviewing anticipated hospital course and possible risks).       Signed By: Jerrod Jain MD     May 9, 2017

## 2017-05-10 ENCOUNTER — DOCUMENTATION ONLY (OUTPATIENT)
Dept: SURGERY | Age: 37
End: 2017-05-10

## 2017-05-10 ENCOUNTER — TELEPHONE (OUTPATIENT)
Dept: SURGERY | Age: 37
End: 2017-05-10

## 2017-05-10 LAB
BACTERIA SPEC CULT: NORMAL
CC UR VC: NORMAL
SERVICE CMNT-IMP: NORMAL

## 2017-05-10 NOTE — TELEPHONE ENCOUNTER
Patient wanted to follow up with NP Collette Client to let her know her Pcp did prescribe anti anxiety medication.

## 2017-05-12 ENCOUNTER — TELEPHONE (OUTPATIENT)
Dept: SURGERY | Age: 37
End: 2017-05-12

## 2017-05-12 NOTE — TELEPHONE ENCOUNTER
This is the second time the patient has called this week and she wanted to follow up with doctor about her new medication.  She is taking an anti anxiety medication and wanted Tyler Weinstein to follow up with her

## 2017-05-25 ENCOUNTER — ANESTHESIA (OUTPATIENT)
Dept: SURGERY | Age: 37
DRG: 621 | End: 2017-05-25
Payer: COMMERCIAL

## 2017-05-25 ENCOUNTER — HOSPITAL ENCOUNTER (INPATIENT)
Age: 37
LOS: 1 days | Discharge: HOME | DRG: 621 | End: 2017-05-26
Attending: SURGERY | Admitting: SURGERY
Payer: COMMERCIAL

## 2017-05-25 ENCOUNTER — ANESTHESIA EVENT (OUTPATIENT)
Dept: SURGERY | Age: 37
DRG: 621 | End: 2017-05-25
Payer: COMMERCIAL

## 2017-05-25 PROBLEM — E66.01 MORBID OBESITY (HCC): Status: ACTIVE | Noted: 2017-05-25

## 2017-05-25 LAB — HCG UR QL: NEGATIVE

## 2017-05-25 PROCEDURE — 74011250636 HC RX REV CODE- 250/636: Performed by: SURGERY

## 2017-05-25 PROCEDURE — 77030009957 HC RELD ENDOSTCH COVD -C: Performed by: SURGERY

## 2017-05-25 PROCEDURE — 77030002916 HC SUT ETHLN J&J -A: Performed by: SURGERY

## 2017-05-25 PROCEDURE — 77030034208 HC SLV GASTRCTMY 3D CAL SYS DISP BOEH -C: Performed by: SURGERY

## 2017-05-25 PROCEDURE — 77030026438 HC STYL ET INTUB CARD -A: Performed by: ANESTHESIOLOGY

## 2017-05-25 PROCEDURE — 76060000033 HC ANESTHESIA 1 TO 1.5 HR: Performed by: SURGERY

## 2017-05-25 PROCEDURE — 77030035051: Performed by: SURGERY

## 2017-05-25 PROCEDURE — 77030034699 HC LIGASURE MRYLND LAP SEAL DIV COVD -F: Performed by: SURGERY

## 2017-05-25 PROCEDURE — 77030035048 HC TRCR ENDOSC OPTCL COVD -B: Performed by: SURGERY

## 2017-05-25 PROCEDURE — 74011250636 HC RX REV CODE- 250/636

## 2017-05-25 PROCEDURE — 77030016151 HC PROTCTR LNS DFOG COVD -B: Performed by: SURGERY

## 2017-05-25 PROCEDURE — 74011000250 HC RX REV CODE- 250: Performed by: SURGERY

## 2017-05-25 PROCEDURE — 77030009956 HC RELD ENDOSTCH COVD -B: Performed by: SURGERY

## 2017-05-25 PROCEDURE — 77030008684 HC TU ET CUF COVD -B: Performed by: ANESTHESIOLOGY

## 2017-05-25 PROCEDURE — 88307 TISSUE EXAM BY PATHOLOGIST: CPT | Performed by: SURGERY

## 2017-05-25 PROCEDURE — 77030020263 HC SOL INJ SOD CL0.9% LFCR 1000ML: Performed by: SURGERY

## 2017-05-25 PROCEDURE — 81025 URINE PREGNANCY TEST: CPT

## 2017-05-25 PROCEDURE — 77030037367 HC STPLR ENDO TRI-STPLR COVD -D: Performed by: SURGERY

## 2017-05-25 PROCEDURE — 0DB64Z3 EXCISION OF STOMACH, PERCUTANEOUS ENDOSCOPIC APPROACH, VERTICAL: ICD-10-PCS | Performed by: SURGERY

## 2017-05-25 PROCEDURE — 76010000149 HC OR TIME 1 TO 1.5 HR: Performed by: SURGERY

## 2017-05-25 PROCEDURE — 77030032435: Performed by: SURGERY

## 2017-05-25 PROCEDURE — 77030002895 HC DEV VASC CLOSR COVD -B: Performed by: SURGERY

## 2017-05-25 PROCEDURE — 74011250637 HC RX REV CODE- 250/637: Performed by: ANESTHESIOLOGY

## 2017-05-25 PROCEDURE — 0DJ68ZZ INSPECTION OF STOMACH, VIA NATURAL OR ARTIFICIAL OPENING ENDOSCOPIC: ICD-10-PCS | Performed by: SURGERY

## 2017-05-25 PROCEDURE — 77030013079 HC BLNKT BAIR HGGR 3M -A: Performed by: ANESTHESIOLOGY

## 2017-05-25 PROCEDURE — 77030031139 HC SUT VCRL2 J&J -A: Performed by: SURGERY

## 2017-05-25 PROCEDURE — 65660000000 HC RM CCU STEPDOWN

## 2017-05-25 PROCEDURE — 77030018846 HC SOL IRR STRL H20 ICUM -A: Performed by: SURGERY

## 2017-05-25 PROCEDURE — 77030032490 HC SLV COMPR SCD KNE COVD -B: Performed by: SURGERY

## 2017-05-25 PROCEDURE — 77030020747 HC TU INSUF ENDOSC TELE -A: Performed by: SURGERY

## 2017-05-25 PROCEDURE — 76210000006 HC OR PH I REC 0.5 TO 1 HR: Performed by: SURGERY

## 2017-05-25 PROCEDURE — 77030012407 HC DRN WND BARD -B: Performed by: SURGERY

## 2017-05-25 PROCEDURE — 77030009403 HC ELECTRD ENDO MEGA -B: Performed by: SURGERY

## 2017-05-25 PROCEDURE — 77030009965 HC RELD STPLR ENDOS COVD -D: Performed by: SURGERY

## 2017-05-25 PROCEDURE — 77030008756 HC TU IRR SUC STRY -B: Performed by: SURGERY

## 2017-05-25 PROCEDURE — 77030020782 HC GWN BAIR PAWS FLX 3M -B

## 2017-05-25 PROCEDURE — 74011000250 HC RX REV CODE- 250

## 2017-05-25 PROCEDURE — 77030010507 HC ADH SKN DERMBND J&J -B: Performed by: SURGERY

## 2017-05-25 PROCEDURE — 74011250637 HC RX REV CODE- 250/637: Performed by: SURGERY

## 2017-05-25 PROCEDURE — 77030013567 HC DRN WND RESERV BARD -A: Performed by: SURGERY

## 2017-05-25 PROCEDURE — 77030018836 HC SOL IRR NACL ICUM -A: Performed by: SURGERY

## 2017-05-25 PROCEDURE — 74011250636 HC RX REV CODE- 250/636: Performed by: ANESTHESIOLOGY

## 2017-05-25 PROCEDURE — 77030037032 HC INSRT SCIS CLICKLLINE DISP STOR -B: Performed by: SURGERY

## 2017-05-25 PROCEDURE — 77030002933 HC SUT MCRYL J&J -A: Performed by: SURGERY

## 2017-05-25 PROCEDURE — 77030008437 HC REINF STRP REINF SEMGD WLGO -C: Performed by: SURGERY

## 2017-05-25 PROCEDURE — 77030035042 HC TRCR ENDOSC OPTCL BLDLSS COVD -D: Performed by: SURGERY

## 2017-05-25 PROCEDURE — 77030011640 HC PAD GRND REM COVD -A: Performed by: SURGERY

## 2017-05-25 DEVICE — STAPLER INT PUR ULT UNIV 45 ROW BIOABSRB REINF FOR COVIDIEN: Type: IMPLANTABLE DEVICE | Site: STOMACH | Status: FUNCTIONAL

## 2017-05-25 DEVICE — STAPLER INT 60 UNIV BLK W TRI STAPLE TECHNOLOGY ULT FOR 4: Type: IMPLANTABLE DEVICE | Site: STOMACH | Status: FUNCTIONAL

## 2017-05-25 DEVICE — STAPLER INT 60 UNIV PUR W/ TRI-STAPLE TECHNOLOGY ULT FOR: Type: IMPLANTABLE DEVICE | Site: STOMACH | Status: FUNCTIONAL

## 2017-05-25 RX ORDER — MIDAZOLAM HYDROCHLORIDE 1 MG/ML
1 INJECTION, SOLUTION INTRAMUSCULAR; INTRAVENOUS AS NEEDED
Status: DISCONTINUED | OUTPATIENT
Start: 2017-05-25 | End: 2017-05-25 | Stop reason: HOSPADM

## 2017-05-25 RX ORDER — BUSPIRONE HYDROCHLORIDE 10 MG/1
10 TABLET ORAL 3 TIMES DAILY
COMMUNITY

## 2017-05-25 RX ORDER — SODIUM CHLORIDE, SODIUM LACTATE, POTASSIUM CHLORIDE, CALCIUM CHLORIDE 600; 310; 30; 20 MG/100ML; MG/100ML; MG/100ML; MG/100ML
125 INJECTION, SOLUTION INTRAVENOUS CONTINUOUS
Status: DISCONTINUED | OUTPATIENT
Start: 2017-05-25 | End: 2017-05-25 | Stop reason: HOSPADM

## 2017-05-25 RX ORDER — SODIUM CHLORIDE, SODIUM LACTATE, POTASSIUM CHLORIDE, CALCIUM CHLORIDE 600; 310; 30; 20 MG/100ML; MG/100ML; MG/100ML; MG/100ML
INJECTION, SOLUTION INTRAVENOUS
Status: DISCONTINUED | OUTPATIENT
Start: 2017-05-25 | End: 2017-05-25 | Stop reason: HOSPADM

## 2017-05-25 RX ORDER — MIDAZOLAM HYDROCHLORIDE 1 MG/ML
INJECTION, SOLUTION INTRAMUSCULAR; INTRAVENOUS AS NEEDED
Status: DISCONTINUED | OUTPATIENT
Start: 2017-05-25 | End: 2017-05-25 | Stop reason: HOSPADM

## 2017-05-25 RX ORDER — ENOXAPARIN SODIUM 100 MG/ML
40 INJECTION SUBCUTANEOUS
Status: COMPLETED | OUTPATIENT
Start: 2017-05-25 | End: 2017-05-25

## 2017-05-25 RX ORDER — SODIUM CHLORIDE 0.9 % (FLUSH) 0.9 %
5-10 SYRINGE (ML) INJECTION EVERY 8 HOURS
Status: DISCONTINUED | OUTPATIENT
Start: 2017-05-25 | End: 2017-05-25 | Stop reason: HOSPADM

## 2017-05-25 RX ORDER — ROCURONIUM BROMIDE 10 MG/ML
INJECTION, SOLUTION INTRAVENOUS AS NEEDED
Status: DISCONTINUED | OUTPATIENT
Start: 2017-05-25 | End: 2017-05-25 | Stop reason: HOSPADM

## 2017-05-25 RX ORDER — HYDROMORPHONE HYDROCHLORIDE 1 MG/ML
0.2 INJECTION, SOLUTION INTRAMUSCULAR; INTRAVENOUS; SUBCUTANEOUS
Status: DISCONTINUED | OUTPATIENT
Start: 2017-05-25 | End: 2017-05-25 | Stop reason: HOSPADM

## 2017-05-25 RX ORDER — MORPHINE SULFATE 10 MG/ML
2 INJECTION, SOLUTION INTRAMUSCULAR; INTRAVENOUS
Status: DISCONTINUED | OUTPATIENT
Start: 2017-05-25 | End: 2017-05-25 | Stop reason: HOSPADM

## 2017-05-25 RX ORDER — KETAMINE HYDROCHLORIDE 10 MG/ML
INJECTION, SOLUTION INTRAMUSCULAR; INTRAVENOUS AS NEEDED
Status: DISCONTINUED | OUTPATIENT
Start: 2017-05-25 | End: 2017-05-25 | Stop reason: HOSPADM

## 2017-05-25 RX ORDER — SODIUM CHLORIDE 9 MG/ML
1000 INJECTION, SOLUTION INTRAVENOUS CONTINUOUS
Status: DISCONTINUED | OUTPATIENT
Start: 2017-05-25 | End: 2017-05-25 | Stop reason: HOSPADM

## 2017-05-25 RX ORDER — PROPOFOL 10 MG/ML
INJECTION, EMULSION INTRAVENOUS
Status: DISCONTINUED | OUTPATIENT
Start: 2017-05-25 | End: 2017-05-25

## 2017-05-25 RX ORDER — SCOLOPAMINE TRANSDERMAL SYSTEM 1 MG/1
1.5 PATCH, EXTENDED RELEASE TRANSDERMAL
Status: DISCONTINUED | OUTPATIENT
Start: 2017-05-25 | End: 2017-05-26 | Stop reason: HOSPADM

## 2017-05-25 RX ORDER — ONDANSETRON 2 MG/ML
4 INJECTION INTRAMUSCULAR; INTRAVENOUS
Status: DISCONTINUED | OUTPATIENT
Start: 2017-05-25 | End: 2017-05-26 | Stop reason: HOSPADM

## 2017-05-25 RX ORDER — NALOXONE HYDROCHLORIDE 0.4 MG/ML
0.4 INJECTION, SOLUTION INTRAMUSCULAR; INTRAVENOUS; SUBCUTANEOUS AS NEEDED
Status: DISCONTINUED | OUTPATIENT
Start: 2017-05-25 | End: 2017-05-26 | Stop reason: HOSPADM

## 2017-05-25 RX ORDER — DEXAMETHASONE SODIUM PHOSPHATE 4 MG/ML
INJECTION, SOLUTION INTRA-ARTICULAR; INTRALESIONAL; INTRAMUSCULAR; INTRAVENOUS; SOFT TISSUE AS NEEDED
Status: DISCONTINUED | OUTPATIENT
Start: 2017-05-25 | End: 2017-05-25 | Stop reason: HOSPADM

## 2017-05-25 RX ORDER — KETOROLAC TROMETHAMINE 30 MG/ML
15 INJECTION, SOLUTION INTRAMUSCULAR; INTRAVENOUS EVERY 6 HOURS
Status: COMPLETED | OUTPATIENT
Start: 2017-05-25 | End: 2017-05-26

## 2017-05-25 RX ORDER — SODIUM CHLORIDE 9 MG/ML
50 INJECTION, SOLUTION INTRAVENOUS CONTINUOUS
Status: DISCONTINUED | OUTPATIENT
Start: 2017-05-25 | End: 2017-05-25 | Stop reason: HOSPADM

## 2017-05-25 RX ORDER — ENOXAPARIN SODIUM 100 MG/ML
40 INJECTION SUBCUTANEOUS EVERY 24 HOURS
Status: DISCONTINUED | OUTPATIENT
Start: 2017-05-26 | End: 2017-05-26 | Stop reason: HOSPADM

## 2017-05-25 RX ORDER — MIDAZOLAM HYDROCHLORIDE 1 MG/ML
0.5 INJECTION, SOLUTION INTRAMUSCULAR; INTRAVENOUS
Status: COMPLETED | OUTPATIENT
Start: 2017-05-25 | End: 2017-05-25

## 2017-05-25 RX ORDER — GLYCOPYRROLATE 0.2 MG/ML
INJECTION INTRAMUSCULAR; INTRAVENOUS AS NEEDED
Status: DISCONTINUED | OUTPATIENT
Start: 2017-05-25 | End: 2017-05-25 | Stop reason: HOSPADM

## 2017-05-25 RX ORDER — SODIUM CHLORIDE 0.9 % (FLUSH) 0.9 %
5-10 SYRINGE (ML) INJECTION EVERY 8 HOURS
Status: DISCONTINUED | OUTPATIENT
Start: 2017-05-25 | End: 2017-05-26 | Stop reason: HOSPADM

## 2017-05-25 RX ORDER — FENTANYL CITRATE 50 UG/ML
50 INJECTION, SOLUTION INTRAMUSCULAR; INTRAVENOUS AS NEEDED
Status: DISCONTINUED | OUTPATIENT
Start: 2017-05-25 | End: 2017-05-25 | Stop reason: HOSPADM

## 2017-05-25 RX ORDER — DIPHENHYDRAMINE HYDROCHLORIDE 50 MG/ML
12.5 INJECTION, SOLUTION INTRAMUSCULAR; INTRAVENOUS AS NEEDED
Status: DISCONTINUED | OUTPATIENT
Start: 2017-05-25 | End: 2017-05-25 | Stop reason: HOSPADM

## 2017-05-25 RX ORDER — SODIUM CHLORIDE 0.9 % (FLUSH) 0.9 %
5-10 SYRINGE (ML) INJECTION AS NEEDED
Status: DISCONTINUED | OUTPATIENT
Start: 2017-05-25 | End: 2017-05-25 | Stop reason: HOSPADM

## 2017-05-25 RX ORDER — SCOLOPAMINE TRANSDERMAL SYSTEM 1 MG/1
1.5 PATCH, EXTENDED RELEASE TRANSDERMAL ONCE
Status: DISCONTINUED | OUTPATIENT
Start: 2017-05-25 | End: 2017-05-26 | Stop reason: HOSPADM

## 2017-05-25 RX ORDER — ONDANSETRON 2 MG/ML
4 INJECTION INTRAMUSCULAR; INTRAVENOUS AS NEEDED
Status: DISCONTINUED | OUTPATIENT
Start: 2017-05-25 | End: 2017-05-25 | Stop reason: HOSPADM

## 2017-05-25 RX ORDER — ACETAMINOPHEN 10 MG/ML
1000 INJECTION, SOLUTION INTRAVENOUS EVERY 8 HOURS
Status: COMPLETED | OUTPATIENT
Start: 2017-05-25 | End: 2017-05-26

## 2017-05-25 RX ORDER — HYOSCYAMINE SULFATE 0.12 MG/ML
125 LIQUID ORAL
Status: DISCONTINUED | OUTPATIENT
Start: 2017-05-25 | End: 2017-05-26 | Stop reason: HOSPADM

## 2017-05-25 RX ORDER — LORAZEPAM 2 MG/ML
1 INJECTION INTRAMUSCULAR
Status: DISCONTINUED | OUTPATIENT
Start: 2017-05-25 | End: 2017-05-26 | Stop reason: HOSPADM

## 2017-05-25 RX ORDER — FENTANYL CITRATE 50 UG/ML
INJECTION, SOLUTION INTRAMUSCULAR; INTRAVENOUS AS NEEDED
Status: DISCONTINUED | OUTPATIENT
Start: 2017-05-25 | End: 2017-05-25 | Stop reason: HOSPADM

## 2017-05-25 RX ORDER — LIDOCAINE HYDROCHLORIDE 20 MG/ML
INJECTION, SOLUTION EPIDURAL; INFILTRATION; INTRACAUDAL; PERINEURAL AS NEEDED
Status: DISCONTINUED | OUTPATIENT
Start: 2017-05-25 | End: 2017-05-25 | Stop reason: HOSPADM

## 2017-05-25 RX ORDER — ONDANSETRON 2 MG/ML
INJECTION INTRAMUSCULAR; INTRAVENOUS AS NEEDED
Status: DISCONTINUED | OUTPATIENT
Start: 2017-05-25 | End: 2017-05-25 | Stop reason: HOSPADM

## 2017-05-25 RX ORDER — SUCCINYLCHOLINE CHLORIDE 20 MG/ML
INJECTION INTRAMUSCULAR; INTRAVENOUS AS NEEDED
Status: DISCONTINUED | OUTPATIENT
Start: 2017-05-25 | End: 2017-05-25 | Stop reason: HOSPADM

## 2017-05-25 RX ORDER — NEOSTIGMINE METHYLSULFATE 1 MG/ML
INJECTION INTRAVENOUS AS NEEDED
Status: DISCONTINUED | OUTPATIENT
Start: 2017-05-25 | End: 2017-05-25 | Stop reason: HOSPADM

## 2017-05-25 RX ORDER — DEXMEDETOMIDINE HYDROCHLORIDE 4 UG/ML
INJECTION, SOLUTION INTRAVENOUS AS NEEDED
Status: DISCONTINUED | OUTPATIENT
Start: 2017-05-25 | End: 2017-05-25 | Stop reason: HOSPADM

## 2017-05-25 RX ORDER — LIDOCAINE HYDROCHLORIDE 10 MG/ML
0.1 INJECTION, SOLUTION EPIDURAL; INFILTRATION; INTRACAUDAL; PERINEURAL AS NEEDED
Status: DISCONTINUED | OUTPATIENT
Start: 2017-05-25 | End: 2017-05-25 | Stop reason: HOSPADM

## 2017-05-25 RX ORDER — SODIUM CHLORIDE 0.9 % (FLUSH) 0.9 %
5-10 SYRINGE (ML) INJECTION AS NEEDED
Status: DISCONTINUED | OUTPATIENT
Start: 2017-05-25 | End: 2017-05-26 | Stop reason: HOSPADM

## 2017-05-25 RX ORDER — HYDROMORPHONE HYDROCHLORIDE 1 MG/ML
1 INJECTION, SOLUTION INTRAMUSCULAR; INTRAVENOUS; SUBCUTANEOUS
Status: DISCONTINUED | OUTPATIENT
Start: 2017-05-25 | End: 2017-05-26 | Stop reason: HOSPADM

## 2017-05-25 RX ORDER — HYDROMORPHONE HYDROCHLORIDE 2 MG/1
2-4 TABLET ORAL
Status: DISCONTINUED | OUTPATIENT
Start: 2017-05-25 | End: 2017-05-26 | Stop reason: HOSPADM

## 2017-05-25 RX ORDER — OXYCODONE AND ACETAMINOPHEN 5; 325 MG/1; MG/1
1 TABLET ORAL AS NEEDED
Status: DISCONTINUED | OUTPATIENT
Start: 2017-05-25 | End: 2017-05-25 | Stop reason: HOSPADM

## 2017-05-25 RX ORDER — PROPOFOL 10 MG/ML
INJECTION, EMULSION INTRAVENOUS AS NEEDED
Status: DISCONTINUED | OUTPATIENT
Start: 2017-05-25 | End: 2017-05-25 | Stop reason: HOSPADM

## 2017-05-25 RX ORDER — SODIUM CHLORIDE, SODIUM LACTATE, POTASSIUM CHLORIDE, CALCIUM CHLORIDE 600; 310; 30; 20 MG/100ML; MG/100ML; MG/100ML; MG/100ML
125 INJECTION, SOLUTION INTRAVENOUS CONTINUOUS
Status: DISPENSED | OUTPATIENT
Start: 2017-05-25 | End: 2017-05-26

## 2017-05-25 RX ORDER — HYDROMORPHONE HYDROCHLORIDE 1 MG/ML
INJECTION, SOLUTION INTRAMUSCULAR; INTRAVENOUS; SUBCUTANEOUS AS NEEDED
Status: DISCONTINUED | OUTPATIENT
Start: 2017-05-25 | End: 2017-05-25 | Stop reason: HOSPADM

## 2017-05-25 RX ORDER — FENTANYL CITRATE 50 UG/ML
25 INJECTION, SOLUTION INTRAMUSCULAR; INTRAVENOUS
Status: COMPLETED | OUTPATIENT
Start: 2017-05-25 | End: 2017-05-25

## 2017-05-25 RX ORDER — BUPIVACAINE HYDROCHLORIDE 5 MG/ML
50 INJECTION, SOLUTION EPIDURAL; INTRACAUDAL ONCE
Status: COMPLETED | OUTPATIENT
Start: 2017-05-25 | End: 2017-05-25

## 2017-05-25 RX ORDER — MIDAZOLAM HYDROCHLORIDE 1 MG/ML
INJECTION, SOLUTION INTRAMUSCULAR; INTRAVENOUS
Status: DISCONTINUED | OUTPATIENT
Start: 2017-05-25 | End: 2017-05-25

## 2017-05-25 RX ADMIN — KETOROLAC TROMETHAMINE 15 MG: 30 INJECTION, SOLUTION INTRAMUSCULAR at 11:45

## 2017-05-25 RX ADMIN — ONDANSETRON 4 MG: 2 INJECTION INTRAMUSCULAR; INTRAVENOUS at 08:36

## 2017-05-25 RX ADMIN — MIDAZOLAM HYDROCHLORIDE 0.5 MG: 1 INJECTION, SOLUTION INTRAMUSCULAR; INTRAVENOUS at 09:38

## 2017-05-25 RX ADMIN — FENTANYL CITRATE 25 MCG: 50 INJECTION, SOLUTION INTRAMUSCULAR; INTRAVENOUS at 10:21

## 2017-05-25 RX ADMIN — KETOROLAC TROMETHAMINE 15 MG: 30 INJECTION, SOLUTION INTRAMUSCULAR at 23:43

## 2017-05-25 RX ADMIN — HYDROMORPHONE HYDROCHLORIDE 4 MG: 2 TABLET ORAL at 23:43

## 2017-05-25 RX ADMIN — FENTANYL CITRATE 25 MCG: 50 INJECTION, SOLUTION INTRAMUSCULAR; INTRAVENOUS at 09:15

## 2017-05-25 RX ADMIN — Medication 10 ML: at 14:00

## 2017-05-25 RX ADMIN — Medication 10 ML: at 21:07

## 2017-05-25 RX ADMIN — HYOSCYAMINE SULFATE 125 MCG: 0.12 LIQUID ORAL at 11:12

## 2017-05-25 RX ADMIN — FENTANYL CITRATE 25 MCG: 50 INJECTION, SOLUTION INTRAMUSCULAR; INTRAVENOUS at 09:36

## 2017-05-25 RX ADMIN — ONDANSETRON 4 MG: 2 INJECTION INTRAMUSCULAR; INTRAVENOUS at 07:44

## 2017-05-25 RX ADMIN — ROCURONIUM BROMIDE 35 MG: 10 INJECTION, SOLUTION INTRAVENOUS at 08:00

## 2017-05-25 RX ADMIN — ACETAMINOPHEN 1000 MG: 10 INJECTION, SOLUTION INTRAVENOUS at 16:42

## 2017-05-25 RX ADMIN — HYDROMORPHONE HYDROCHLORIDE 0.25 MG: 1 INJECTION, SOLUTION INTRAMUSCULAR; INTRAVENOUS; SUBCUTANEOUS at 08:10

## 2017-05-25 RX ADMIN — NEOSTIGMINE METHYLSULFATE 3 MG: 1 INJECTION INTRAVENOUS at 08:37

## 2017-05-25 RX ADMIN — MIDAZOLAM HYDROCHLORIDE 0.5 MG: 1 INJECTION, SOLUTION INTRAMUSCULAR; INTRAVENOUS at 10:32

## 2017-05-25 RX ADMIN — MIDAZOLAM HYDROCHLORIDE 0.5 MG: 1 INJECTION, SOLUTION INTRAMUSCULAR; INTRAVENOUS at 09:17

## 2017-05-25 RX ADMIN — MIDAZOLAM HYDROCHLORIDE 2 MG: 1 INJECTION, SOLUTION INTRAMUSCULAR; INTRAVENOUS at 06:56

## 2017-05-25 RX ADMIN — ACETAMINOPHEN 1000 MG: 10 INJECTION, SOLUTION INTRAVENOUS at 09:21

## 2017-05-25 RX ADMIN — HYDROMORPHONE HYDROCHLORIDE 1 MG: 1 INJECTION, SOLUTION INTRAMUSCULAR; INTRAVENOUS; SUBCUTANEOUS at 14:23

## 2017-05-25 RX ADMIN — SODIUM CHLORIDE, SODIUM LACTATE, POTASSIUM CHLORIDE, CALCIUM CHLORIDE: 600; 310; 30; 20 INJECTION, SOLUTION INTRAVENOUS at 07:26

## 2017-05-25 RX ADMIN — SUCCINYLCHOLINE CHLORIDE 140 MG: 20 INJECTION INTRAMUSCULAR; INTRAVENOUS at 07:37

## 2017-05-25 RX ADMIN — LORAZEPAM 1 MG: 2 INJECTION INTRAMUSCULAR; INTRAVENOUS at 23:55

## 2017-05-25 RX ADMIN — ONDANSETRON 4 MG: 2 INJECTION INTRAMUSCULAR; INTRAVENOUS at 17:22

## 2017-05-25 RX ADMIN — HYDROMORPHONE HYDROCHLORIDE 0.2 MG: 1 INJECTION, SOLUTION INTRAMUSCULAR; INTRAVENOUS; SUBCUTANEOUS at 12:10

## 2017-05-25 RX ADMIN — ONDANSETRON 4 MG: 2 INJECTION INTRAMUSCULAR; INTRAVENOUS at 21:07

## 2017-05-25 RX ADMIN — FENTANYL CITRATE 25 MCG: 50 INJECTION, SOLUTION INTRAMUSCULAR; INTRAVENOUS at 09:22

## 2017-05-25 RX ADMIN — ROCURONIUM BROMIDE 10 MG: 10 INJECTION, SOLUTION INTRAVENOUS at 08:26

## 2017-05-25 RX ADMIN — PROPOFOL 200 MG: 10 INJECTION, EMULSION INTRAVENOUS at 07:37

## 2017-05-25 RX ADMIN — FENTANYL CITRATE 100 MCG: 50 INJECTION, SOLUTION INTRAMUSCULAR; INTRAVENOUS at 07:37

## 2017-05-25 RX ADMIN — GLYCOPYRROLATE 0.5 MG: 0.2 INJECTION INTRAMUSCULAR; INTRAVENOUS at 08:37

## 2017-05-25 RX ADMIN — DEXAMETHASONE SODIUM PHOSPHATE 8 MG: 4 INJECTION, SOLUTION INTRA-ARTICULAR; INTRALESIONAL; INTRAMUSCULAR; INTRAVENOUS; SOFT TISSUE at 07:44

## 2017-05-25 RX ADMIN — MIDAZOLAM HYDROCHLORIDE 0.5 MG: 1 INJECTION, SOLUTION INTRAMUSCULAR; INTRAVENOUS at 10:22

## 2017-05-25 RX ADMIN — HYDROMORPHONE HYDROCHLORIDE 0.25 MG: 1 INJECTION, SOLUTION INTRAMUSCULAR; INTRAVENOUS; SUBCUTANEOUS at 08:56

## 2017-05-25 RX ADMIN — KETAMINE HYDROCHLORIDE 25 MG: 10 INJECTION, SOLUTION INTRAMUSCULAR; INTRAVENOUS at 08:10

## 2017-05-25 RX ADMIN — HYDROMORPHONE HYDROCHLORIDE 0.2 MG: 1 INJECTION, SOLUTION INTRAMUSCULAR; INTRAVENOUS; SUBCUTANEOUS at 11:25

## 2017-05-25 RX ADMIN — SODIUM CHLORIDE, SODIUM LACTATE, POTASSIUM CHLORIDE, AND CALCIUM CHLORIDE 125 ML/HR: 600; 310; 30; 20 INJECTION, SOLUTION INTRAVENOUS at 06:50

## 2017-05-25 RX ADMIN — ROCURONIUM BROMIDE 5 MG: 10 INJECTION, SOLUTION INTRAVENOUS at 07:37

## 2017-05-25 RX ADMIN — CEFAZOLIN 3 G: 1 INJECTION, POWDER, FOR SOLUTION INTRAMUSCULAR; INTRAVENOUS; PARENTERAL at 07:44

## 2017-05-25 RX ADMIN — KETOROLAC TROMETHAMINE 15 MG: 30 INJECTION, SOLUTION INTRAMUSCULAR at 18:14

## 2017-05-25 RX ADMIN — HYDROMORPHONE HYDROCHLORIDE 0.2 MG: 1 INJECTION, SOLUTION INTRAMUSCULAR; INTRAVENOUS; SUBCUTANEOUS at 11:40

## 2017-05-25 RX ADMIN — DEXMEDETOMIDINE HYDROCHLORIDE 15 MCG: 4 INJECTION, SOLUTION INTRAVENOUS at 08:39

## 2017-05-25 RX ADMIN — SODIUM CHLORIDE, SODIUM LACTATE, POTASSIUM CHLORIDE, AND CALCIUM CHLORIDE 125 ML/HR: 600; 310; 30; 20 INJECTION, SOLUTION INTRAVENOUS at 11:28

## 2017-05-25 RX ADMIN — MIDAZOLAM HYDROCHLORIDE 2 MG: 1 INJECTION, SOLUTION INTRAMUSCULAR; INTRAVENOUS at 07:26

## 2017-05-25 RX ADMIN — HYDROMORPHONE HYDROCHLORIDE 0.2 MG: 1 INJECTION, SOLUTION INTRAMUSCULAR; INTRAVENOUS; SUBCUTANEOUS at 11:55

## 2017-05-25 RX ADMIN — ENOXAPARIN SODIUM 40 MG: 40 INJECTION SUBCUTANEOUS at 06:30

## 2017-05-25 RX ADMIN — LIDOCAINE HYDROCHLORIDE 80 MG: 20 INJECTION, SOLUTION EPIDURAL; INFILTRATION; INTRACAUDAL; PERINEURAL at 07:37

## 2017-05-25 NOTE — ROUTINE PROCESS
Patient: Antonio Levi MRN: [id number]  SSN: [id number]   YOB: 1980  Age: 39 y.o. Sex: female     Patient is status post Procedure(s):  LAPAROSCOPIC SLEEVE GASTRECTOMY WITH EGD  ESOPHAGOGASTRODUODENOSCOPY (EGD). Surgeon(s) and Role:     * Denae Woods MD - Primary    Local/Dose/Irrigation:  See mar                  Peripheral IV 05/25/17 Right Hand (Active)   Dressing Status Clean, dry, & intact; New 5/25/2017  6:22 AM   Dressing Type Tape;Transparent 5/25/2017  6:22 AM   Hub Color/Line Status Pink; Infusing 5/25/2017  6:22 AM          Patsy Vega #1 05/25/17 Left; Lower Abdomen (Active)                     Dressing/Packing:  Wound Abdomen Anterior-DRESSING TYPE: Topical skin adhesive/glue (05/25/17 0700)  Splint/Cast:  ]    Other:

## 2017-05-25 NOTE — OP NOTES
[address] [facility], [place] [address]   OP NOTE       Name:  Elizabeth Stephens   MR#:  [id number]   :  1980   Account #:  [de-identified]    Surgery Date:  2017   Date of Adm:  2017       PRIMARY PREOPERATIVE DIAGNOSIS: Morbid obesity. SECONDARY PREOPERATIVE DIAGNOSES   1. Asthma. 2. Polycystic ovarian syndrome. POSTOPERATIVE DIAGNOSES   1. Morbid obesity. 2. Asthma. 3. Polycystic ovarian syndrome. PROCEDURES PERFORMED   1. Laparoscopic sleeve gastrectomy. 2. Intraoperative upper endoscopy. SURGEON: Zoya Sellers MD    ASSISTANT: OBED Luciano    ANESTHESIA: General endotracheal.    DRAINS: A 19 mm Charly drain. COUNTS: Sponge count correct. Needle count correct. SPECIMENS REMOVED: Gastric fundus. ESTIMATED BLOOD LOSS: 20 mL. COMPLICATIONS: None. INDICATIONS: The patient is a 80-year-old white female with a height   of 69 inches, a weight of 287 pounds, with a resultant body mass index   of 42.4 kg per meter squared on a medium frame. She has the above-  listed, obesity-related conditions. All Rehab/medical efforts at weight   loss have been unsuccessful. After extensive preoperative counseling,   patient education and medical screening, it was felt she would be a   good candidate for weight reduction surgery. She presents to [facility] [facility] [address] today for laparoscopic sleeve gastrectomy. I have   asked OBED Luciano, to assist with the procedure, given the   technical complexity of laparoscopic bariatric surgery. She will run the   laparoscope and assist in creation of the sleeve. FINDINGS   1. Sleeve gastrectomy created over a 40-Tamazight suction bougie. 2. No intraluminal hemorrhage or insufflation air leak on upper   endoscopy. PROCEDURE: The patient was identified as the correct patient in the   preoperative holding area and informed consent was confirmed.  After   answering the patient's remaining questions and confirming she had   received 40 mg of Lovenox subcutaneously, she was taken to the   operating room and placed on the operating room table in the supine   position. Sequential compression devices were placed on both lower   extremities. Following the uneventful initiation of general anesthesia,   she was carefully secured to the operating room table with foot board   and safety strap in place. All potential pressure points were padded   with egg crate. Her abdomen was prepped and draped in the usual   sterile fashion. Final time-out was performed, and it was confirmed she   had received intravenous antibiotics. A 5 mm trocar was inserted through a small left subcostal skin incision   using an Optiview technique. After confirming intraperitoneal location   of the trocar tip, insufflation with carbon dioxide gas was initiated. Once   adequate working space had been developed, the 5 mm, 30-degree   laparoscope was inserted. No signs of trocar injury were present. The   liver was noted to be of normal size and texture. A 5 mm trocar was   inserted 10 cm superior to the umbilicus using visual guidance with the   laparoscope. Two right upper quadrant trocars, one 5 mm and the   other 15 mm, were inserted using identical technique. A Justina   liver retractor was inserted through a small subxiphoid incision. With   the liver retracted anteriorly and cephalad, the esophageal hiatus was   visualized. No signs of hiatal hernia were present. A site 4 cm proximal   to the pylorus was chosen along the greater curve of the stomach. The   gastrocolic ligament was incised at this location using the bipolar   device. This allowed atraumatic entry into the lesser sac. The   gastrocolic ligament was serially ligated and divided using the bipolar   device. This included takedown of short gastric vessels and   retrogastric attachments. The fundus was freed from the entirety of the   left bobbi of the diaphragm.  The fat pad was mobilized medially. A 40-  Syriac suction bougie was passed transorally, through the   gastroesophageal junction, and along the length of the lesser curve of   the stomach into the antrum. Suction was then initiated. Sleeve   gastrectomy was initiated with a firing of a black load linear stapler   from the site 4 cm proximal to the pylorus in the direction of the   incisura angularis. The remainder of the sleeve was created with   multiple firings of a purple load linear stapler using SeamGuard   reinforcement material, with care to avoid narrowing at the incisura   angularis. Following sleeve construction, the gastric fundus was   removed from the patient's body and sent to Pathology for review. The   patient was placed in the steep reverse Trendelenburg position. Sterile   saline was instilled into the upper abdomen. Intraoperative upper   endoscopy was performed. The gastroscope was passed transorally,   through the gastroesophageal junction, and along the length of the   sleeve to the level of the pylorus. No intraluminal hemorrhage was   identified, and no insufflation air leak occurred. No difficulty passing the   scope to the level of the pylorus was encountered. The stomach was   decompressed, and the gastroscope was removed. Sterile saline was   evacuated from the upper abdomen. A 19 mm Charly drain was   inserted into the abdominal space. It was positioned to lie adjacent to   the staple line and brought out through the left subcostal 5 mm trocar   wound. It was secured to the skin with a 2-0 nylon suture. After   confirming adequate hemostasis, the Justina liver retractor was   removed, followed by closure of the right upper quadrant 15 mm fascial   defect using a 0 Vicryl suture with a laparoscopic suture passer. Pneumoperitoneum was released, and all trocars were removed. All   wounds were infiltrated with 0.5% Marcaine without epinephrine.  All   skin edges were reapproximated with a combination of subcuticular 4-0   Monocryl suture and Dermabond. The patient tolerated the procedure   well. She was extubated in the operating room and transported to the   recovery area in stable condition. The attending surgeon, Dr. Elias Moser, was scrubbed and present for   the entire procedure.         Jarad Corbin MD      [facility]   D:  05/25/2017   12:41   T:  05/25/2017   13:00   Job #:  [id number]

## 2017-05-25 NOTE — PROGRESS NOTES
12:40 TRANSFER - IN REPORT:    Verbal report received from ALICIA Calvin(name) on [facility]  being received from PACU for routine post - op      Report consisted of patients Situation, Background, Assessment and   Recommendations(SBAR). Information from the following report(s) SBAR, Kardex and OR Summary was reviewed with the receiving nurse. Opportunity for questions and clarification was provided. Assessment completed upon patients arrival to unit and care assumed. 20:00 Primary Nurse Mary Haro RN and Erick Kowalski RN performed a dual skin assessment on this patient No impairment noted  Antwon score is 20  Bedside shift change report given to Erick Kowalski RN (oncoming nurse) by Kayla Johnson RN (offgoing nurse). Report included the following information SBAR, Kardex and Recent Results.

## 2017-05-25 NOTE — BRIEF OP NOTE
BRIEF OPERATIVE NOTE    Date of Procedure: 5/25/2017   Preoperative Diagnosis: MORBID OBESITY  Postoperative Diagnosis: MORBID OBESITY    Procedure(s):  LAPAROSCOPIC SLEEVE GASTRECTOMY WITH EGD  ESOPHAGOGASTRODUODENOSCOPY (EGD)  Surgeon(s) and Role:     * Evelyn Sanchez MD - Primary         Assistant Staff:  Physician Assistant: OBED Greenberg    Surgical Staff:  Circ-1: Deana Koroma RN  Circ-2: Cheryl Carey RN  Physician Assistant: OBED Greenberg  Scrub Tech-1: Dl Ramsey  Event Time In   Incision Start 2241   Incision Close 2548     Anesthesia: General   Estimated Blood Loss: 20 cc  Specimens:   ID Type Source Tests Collected by Time Destination   1 : Gasric Fundus Fresh Stomach, Fundus  Evelyn Sanchez MD 5/25/2017 0830 Pathology      Findings: sleeve created over 40F bougie; no air leak or hemorrhage on upper endscopy   Complications: none  Implants:   Implant Name Type Inv. Item Serial No.  Lot No. LRB No. Used Action   STAPLE REINF BIOSRB 3X3. 5X4MM -- SEAMGUARD PUR - SNA  STAPLE REINF BIOSRB 3X3. 5X4MM -- SEAMGUARD PUR NA [employer] [id number] N/A 1 Implanted   STAPLE REINF BIOSRB BLK [id number] -- F/ENDO GIA60 SEAMGUARD IMPL - SNA  STAPLE REINF BIOSRB BLK UNIV60 -- F/ENDO GIA60 [facility] IMPL NA [employer] [id number] N/A 1 Implanted   STAPLE REINF BIOSRB PUR UNIV60 -- F/ENDO GIA60 SEAMGUARD PUR - SNA   STAPLE REINF BIOSRB PUR UNIV60 -- F/ENDO GIA60 [facility] PUR NA [employer] [id number] N/A 1 Implanted

## 2017-05-25 NOTE — H&P
HISTORY OF PRESENT ILLNESS     The patient is a 38 y/o morbidly obese white female; no success with medical measures. Height is 69\", weight is 287 lbs, BMI is 42.42 kg/m^2).              Patient Active Problem List     Diagnosis Date Noted    Mild intermittent asthma without complication 33/97/1067    PCOS (polycystic ovarian syndrome) 03/10/2017    Palpitations 01/30/2017    Morbid obesity with BMI of 40.0-44.9, adult ([facility] 09/27/2016           Past Medical History:   Diagnosis Date    Anxiety      Asthma       ATHLETIC INDUCED ASTHMA    Depression      Morbid obesity ([facility]      PCOS (polycystic ovarian syndrome)              Past Surgical History:   Procedure Laterality Date    HX HEENT   1985     tubes in ears as child    HX HEENT   1998     wisdom teeth removed    HX OTHER SURGICAL Bilateral 2011     Liposuction               Social History    Substance Use Topics     Smoking status: Never Smoker     Smokeless tobacco: Never Used     Alcohol use Yes         Comment: 1-2 drinks per month              Family History   Problem Relation Age of Onset    Hypertension Mother      Ulcerative Colitis Mother      Heart Disease Father      Hypertension Father      Heart Surgery Father         AORTIC VALVE REPLACED AND ABLATION, HISTORY OF AFIB    Cancer Maternal Grandmother         BREAST CANCER    Heart Disease Paternal Grandfather      Anesth Problems Neg Hx                Prior to Admission medications    Medication Sig Start Date End Date Taking?  Authorizing Provider   amphetamine-dextroamphetamine XR (ADDERALL XR) 20 mg XR capsule Take 20 mg by mouth two (2) times a day.            Yes Historical Provider   multivitamin (ONE A DAY) tablet Take 1 Tab by mouth daily.     Yes Historical Provider   ibuprofen (ADVIL) 200 mg tablet Take 200 mg by mouth every six (6) hours as needed for Pain.       Historical Provider            Allergies   Allergen Reactions    Other Food Other (comments)       Walnuts and pineapples cause mouth irritation.         Review of Systems   Constitutional: Negative for chills, fever and malaise/fatigue. HENT: Positive for congestion (getting over a cold) and sore throat. Negative for ear discharge and tinnitus. Eyes: Positive for blurred vision (wears glasses). Negative for double vision, pain, discharge and redness. Respiratory: Positive for cough. Negative for sputum production, shortness of breath and wheezing. Cardiovascular: Negative for chest pain, palpitations and leg swelling. Gastrointestinal: Negative for constipation, diarrhea, heartburn, nausea and vomiting. Genitourinary: Negative for dysuria, frequency and urgency. Musculoskeletal: Positive for back pain (lower back). Negative for joint pain and neck pain. Skin: Negative for itching and rash. Neurological: Positive for headaches (occular migraines recently). Negative for dizziness, tingling and seizures. Psychiatric/Behavioral: Depression: hx of depression. she wa on Celexa, Lexapro and Citalopram in the past. Currently weened off. Anxious         Physical Exam   Visit Vitals    /74 (BP 1 Location: Right arm, BP Patient Position: At rest)    Pulse 97    Temp 98.4 °F (36.9 °C)    Resp 20    Ht 5' 9\" (1.753 m)    Wt 287 lb 4 oz (130.3 kg)    LMP  (Within Weeks)    SpO2 97%    BMI 42.42 kg/m2     Constitutional: She is oriented to person, place, and time. She appears well-developed and well-nourished. Head: Normocephalic. Neck: Normal range of motion. Neck supple. Cardiovascular: Normal rate and regular rhythm. Exam reveals no gallop and no friction rub. No murmur heard. Pulmonary/Chest: Effort normal and breath sounds normal.   Abdominal: Soft. Bowel sounds are normal. She exhibits no distension. There is no tenderness. No masses or hernias noted. Neurological: She is alert and oriented to person, place, and time. Skin: Skin is warm and dry. Psychiatric: Tearful.         Assessment:      Morbid obesity with body mass index of 43. Co-morbids listed above     Plan:   Patient is schedule for Sleeve Gastrectomy with  on 5/25/2017 at [address] patient in regard to post diet restrictions, follow- up office visits, follow- up care. Patient as received educational booklet and vitamin list. E-prescribed post-op medications:Levsin, Prilosec and Zofran. Reviewed liver shrinking diet. Patient is anxious about the next 2 weeks before surgery. She has been on Celexa, Lexapro, xanax and Citalopram in the past. She is off all of these currently.

## 2017-05-25 NOTE — IP AVS SNAPSHOT
[address] [address] 
[phone] Patient: Enrique Franz MRN: [id number] LPC:8/50/0130 You are allergic to the following Allergen Reactions Other Food Other (comments) Walnuts and pineapples cause mouth irritation. Recent Documentation Height Weight BMI OB Status Smoking Status 1.753 m 133 kg 43.3 kg/m2 Having regular periods Never Smoker Emergency Contacts Name Discharge Info Relation Home Work Mobile [address] CAREGIVER [3] Spouse [3] [phone] [address] CAREGIVER [3] Mother [14] [phone] Kashif Perez  Spouse [3] [phone] About your hospitalization You were admitted on:  May 25, 2017 You last received care in the:  [facility] 4 SURG/BARIATRICS You were discharged on:  May 26, 2017 Unit phone number:  [phone] Why you were hospitalized Your primary diagnosis was:  Not on File Your diagnoses also included: Morbid Obesity (Hcc) Providers Seen During Your Hospitalizations Provider Role Specialty Primary office phone Harshal Krueger MD Attending Provider General Surgery [phone] Your Primary Care Physician (PCP) Primary Care Physician Office Phone Office Fax [facility] [phone] Follow-up Information Follow up With Details Comments Contact Info Maggy Underwood MD   [address] [address] 
[phone] Your Appointments Thursday June 08, 2017 10:40 AM EDT  
POST OP 10 MIN with KARSTEN Garcia [id number] ([facility] [address] [place] [address] [phone]  
[phone] Thursday June 22, 2017 10:40 AM EDT  
POST OP 10 MIN with KARSTEN Garcia [phone] ([facility] [address] [place] [address] [phone]  
[phone] Thursday July 06, 2017 10:20 AM EDT  
POST OP 10 MIN with KARSTEN Rutherford [phone] ([facility]) [address] [facility] [phone]  
[phone] Current Discharge Medication List  
  
START taking these medications Dose & Instructions Dispensing Information Comments Morning Noon Evening Bedtime HYDROmorphone 2 mg tablet Commonly known as:  DILAUDID Your last dose was: Your next dose is:    
   
   
 Dose:  2-4 mg Take 1-2 Tabs by mouth every six (6) hours as needed for Pain. Max Daily Amount: 12 mg. Quantity:  32 Tab Refills:  0 LORazepam 0.5 mg tablet Commonly known as:  ATIVAN Your last dose was: Your next dose is:    
   
   
 Dose:  0.5 mg Take 1 Tab by mouth every eight (8) hours as needed for Anxiety. Max Daily Amount: 1.5 mg.  
 Quantity:  15 Tab Refills:  0 CONTINUE these medications which have NOT CHANGED Dose & Instructions Dispensing Information Comments Morning Noon Evening Bedtime  
 busPIRone 10 mg tablet Commonly known as:  BUSPAR Your last dose was: Your next dose is:    
   
   
 Dose:  10 mg Take 10 mg by mouth three (3) times daily. Refills:  0  
     
   
   
   
  
 ergocalciferol 50,000 unit capsule Commonly known as:  ERGOCALCIFEROL Your last dose was: Your next dose is:    
   
   
 Dose:  52575 Units Take 1 Cap by mouth every Monday and Friday. Quantity:  8 Cap Refills:  2  
     
   
   
   
  
 hyoscyamine SL 0.125 mg SL tablet Commonly known as:  LEVSIN/SL Your last dose was: Your next dose is:    
   
   
 Dose:  0.125 mg  
1 Tab by SubLINGual route every four (4) hours as needed for Cramping. Quantity:  30 Tab Refills:  0 multivitamin tablet Commonly known as:  ONE A DAY Your last dose was: Your next dose is:    
   
   
 Dose:  1 Tab Take 1 Tab by mouth daily. Refills:  0 Omeprazole delayed release 20 mg tablet Commonly known as:  PRILOSEC D/R Your last dose was: Your next dose is:    
   
   
 Dose:  20 mg Take 1 Tab by mouth daily. Quantity:  30 Tab Refills:  5  
     
   
   
   
  
 ondansetron 4 mg disintegrating tablet Commonly known as:  ZOFRAN ODT Your last dose was: Your next dose is:    
   
   
 Dose:  4 mg Take 1 Tab by mouth every eight (8) hours as needed for Nausea. Quantity:  30 Tab Refills:  0 STOP taking these medications ADVIL 200 mg tablet Generic drug:  ibuprofen ASK your doctor about these medications Dose & Instructions Dispensing Information Comments Morning Noon Evening Bedtime  
 dextroamphetamine-amphetamine 20 mg tablet Commonly known as:  ADDERALL Ask about: Which instructions should I use? Your last dose was: Your next dose is:    
   
   
 Dose:  20 mg Take 20 mg by mouth daily. Refills:  0 Where to Get Your Medications Information on where to get these meds will be given to you by the nurse or doctor. ! Ask your nurse or doctor about these medications HYDROmorphone 2 mg tablet LORazepam 0.5 mg tablet Discharge Instructions [address] [facility] 
Procedure:  Laparoscopic Sleeve Gastrectomy You have  a 2 week follow up appointment 6/8/2017 10:40 AM KARSTEN Cifuentes 22 673 It is common to experience reflux or heartburn after sleeve gastrectomy. These symptoms are generally self-limited and resolve over several weeks to couple of months.  your doctor may have prescribed medication to prevent these symptoms. If you experience reflux and feel it is limiting your fluid intake call your doctor immediately so that it can be effectively treated. Please remember that you are on ONLY LIQUIDS for the first 2 weeks after surgery until you are seen in our office. You should be drinking plenty of no calorie, non carbonated liquids through out the day and your meals are going to be a shake, the same type you did for your 2 week pre-op diet. Do not advance your diet until you are seen in our office. [address] 
[phone] Laparoscopic Sleeve Gastrectomy: What to Expect at Home Your Recovery A sleeve gastrectomy is a  surgery to make the stomach smaller. It is done to help people lose weight. The surgery limits the amount of food the stomach can hold. This helps you eat less and feel full sooner. The cuts (incisions) the doctor made in your belly will probably be sore for several days after the surgery. You probably will lose weight very quickly in the first few months after surgery. As time goes on, your weight loss will slow down. You can expect most of your weight loss to happen in the first 12 months after your surgery. You will have regular doctor's appointments during this time to check how you are doing. It is important to think of this surgery as a tool to help you lose weight. It is not an instant fix. You will still need to eat a healthy diet and get regular exercise. This will help you reach your weight goal and avoid regaining the weight you lose. It is common to have many different emotions after this surgery. You may feel happy or excited as you begin to lose weight. But you may also feel overwhelmed or frustrated by the changes that you have to make in your diet, activity, and lifestyle. Talk with your doctor if you have concerns or questions. This care sheet gives you a general idea about how long it will take for you to recover. But each person recovers at a different pace. Follow the steps below to get better as quickly as possible. How can you care for yourself at home? Activity · Rest when you feel tired. Getting enough sleep will help you recover. · Try to walk each day. Start by walking a little more than you did the day before. Bit by bit, increase the amount you walk. Walking boosts blood flow and helps prevent pneumonia and constipation. · Avoid strenuous activities, such as bicycle riding, jogging, weight lifting, or aerobic exercise, until your doctor says it is okay. · Until your doctor says it is okay, avoid lifting anything that would make you strain. This may include heavy grocery bags and milk containers, a heavy briefcase or backpack, cat litter or dog food bags, a vacuum , or a child. · Hold a pillow over your incisions when you cough or take deep breaths. This will support your belly and decrease your pain. · Do breathing exercises at home as instructed by your doctor. This will help prevent pneumonia. · You can drive when you are no longer taking narcotic pain medications and feel comfortable sitting in a car. · You will probably need to take 2 to 4 weeks off from work. It depends on the type of work you do and how you feel. You will probably return to normal activities within 3 to 5 weeks. · You may shower, if your doctor okays it. Pat the incisions dry. Do not take a bath for the first 2 weeks, or until your doctor tells you it is okay. Diet · Your doctor will give you specific instructions about what to eat after the surgery. For the first 2 weeks, you will need to follow a liquid diet only. DO NOT ADVANCE TO SOFT FOOD UNTIL CLEARED BY YOUR DOCTOR.  
· Your doctor may recommend that you work with a dietitian to plan healthy meals that give you enough protein, vitamins, and minerals while you are losing weight. Even with a healthy diet, you probably will need to take vitamin and mineral supplements for the rest of your life. · At first you may feel full after just a few sips of water or other liquid. It is important to try to sip water throughout the day to avoid becoming dehydrated. · You may notice that your bowel movements are not regular right after your surgery. This is common. Try to avoid constipation and straining with bowel movements. · Sometimes the stomach empties food into the small intestine too quickly. This is called dumping syndrome. It can cause diarrhea and make you feel faint, shaky, and nauseated. It also can make it hard for your body to get enough nutrition. ¨  Avoid high-sugar foods, or use products that have artificial sweeteners. ¨ Do not drink liquids within a half hour before eating and up to an hour after eating. ¨  
¨ Eat slowly. Try to chew each bite about 20 times. Allow 20 to 30 minutes for each meal. 
 Eat 3-4 4 ounce small meals or snacks a day. Medicines · Take pain medicines exactly as directed. ¨ If the doctor gave you a prescription medicine for pain, take it as prescribed. ¨ If you are not taking a prescription pain medicine, ask your doctor if you can take an over-the-counter medicine. ¨ Do not take two or more pain medicines at the same time unless the doctor told you to. Many pain medicines have acetaminophen, which is Tylenol. Too much acetaminophen (Tylenol) can be harmful. · If you think your pain medicine is making you sick to your stomach: 
¨ Take your medicine after meals (unless your doctor has told you not to). Incision care · Your incisions have a waterproof clue dressing on them that will peel off in 2 weeks. · Wash the area daily with warm, soapy water, and pat it dry. Other cleaning products, such as hydrogen peroxide, can make the wound heal more slowly.  You may cover the area with a gauze bandage if it weeps or rubs against clothing. Change the bandage every day. · Keep the area clean and dry. Follow-up care is a key part of your treatment and safety. Be sure to make and go to all appointments, and call your doctor if you are having problems. Itâs also a good idea to know your test results and keep a list of the medicines you take. When should you call for help? Call 911 anytime you think you may need emergency care. For example, call if: 
· You pass out (lose consciousness). · You have severe trouble breathing. · You have sudden chest pain and shortness of breath, or you cough up blood. · You have severe belly pain. Call your doctor now or seek immediate medical care if: 
· You are sick to your stomach or cannot keep fluids down. · You have pain that does not get better after you take pain medicine. · You have a fever over 101°F. 
· You have loose stitches, or any of your incisions come open. · Bright red blood has soaked through the bandages over your incisions. · You have signs of infection, such as: 
¨ Increased pain, swelling, warmth, or redness. ¨ Red streaks leading from the incisions. ¨ Pus draining from the incision. ¨ Swollen lymph nodes in your neck, armpits, or groin. ¨ A fever. · You have signs of needing more fluids. You have sunken eyes and a dry mouth, and you pass only a little dark urine. Watch closely for changes in your health, and be sure to contact your doctor if you have any problems. Where can you learn more? Go to [employer]. Enter Y354 in the search box to learn more about \"Laparoscopic Yaakov-en-Y Gastric Bypass: What to Expect at Home. \"  
© 6775-3816 Healthwise, Incorporated. Care instructions adapted under license by [employer] (which disclaims liability or warranty for this information).  This care instruction is for use with your licensed healthcare professional. If you have questions about a medical condition or this instruction, always ask your healthcare professional. Dexter Alvarez any warranty or liability for your use of this information. Discharge Orders None V[employer] Announcement We are excited to announce that we are making your provider's discharge notes available to you in [employer]axart. You will see these notes when they are completed and signed by the physician that discharged you from your recent hospital stay. If you have any questions or concerns about any information you see in Vaxart, please call the Health Information Department where you were seen or reach out to your Primary Care Provider for more information about your plan of care. Introducing [facility] HE[facility]! Camila Cleverly introduces Vaxart patient portal. Now you can access parts of your medical record, email your doctor's office, and request medication refills online. 1. In your internet browser, go to [email or web address] 2. Click on the First Time User? Click Here link in the Sign In box. You will see the New Member Sign Up page. 3. Enter your Vaxart Access Code exactly as it appears below. You will not need to use this code after youve completed the sign-up process. If you do not sign up before the expiration date, you must request a new code. · Vaxart Access Code: [email or web address] Expires: 8/7/2017  9:01 AM 
 
4. Enter the last four digits of your Social Security Number (xxxx) and Date of Birth (mm/dd/yyyy) as indicated and click Submit. You will be taken to the next sign-up page. 5. Create a Vaxart ID. This will be your Vaxart login ID and cannot be changed, so think of one that is secure and easy to remember. 6. Create a Vaxart password. You can change your password at any time. 7. Enter your Password Reset Question and Answer. This can be used at a later time if you forget your password. 8. Enter your e-mail address.  You will receive e-mail notification when new information is available in [employer]. 9. Click Sign Up. You can now view and download portions of your medical record. 10. Click the Download Summary menu link to download a portable copy of your medical information. If you have questions, please visit the Frequently Asked Questions section of the [employer] website. Remember, [employer] is NOT to be used for urgent needs. For medical emergencies, dial 911. Now available from your iPhone and Android! General Information Please provide this summary of care documentation to your next provider. Patient Signature:  ____________________________________________________________ Date:  ____________________________________________________________  
  
Augustus Pak Provider Signature:  ____________________________________________________________ Date:  ____________________________________________________________

## 2017-05-25 NOTE — ANESTHESIA PREPROCEDURE EVALUATION
Anesthetic History   No history of anesthetic complications            Review of Systems / Medical History  Patient summary reviewed, nursing notes reviewed and pertinent labs reviewed    Pulmonary            Asthma        Neuro/Psych         Psychiatric history     Cardiovascular  Within defined limits                     GI/Hepatic/Renal  Within defined limits              Endo/Other        Morbid obesity     Other Findings            Physical Exam    Airway  Mallampati: II  TM Distance: > 6 cm  Neck ROM: normal range of motion   Mouth opening: Normal     Cardiovascular  Regular rate and rhythm,  S1 and S2 normal,  no murmur, click, rub, or gallop             Dental  No notable dental hx       Pulmonary  Breath sounds clear to auscultation               Abdominal  GI exam deferred       Other Findings            Anesthetic Plan    ASA: 3  Anesthesia type: general          Induction: Intravenous  Anesthetic plan and risks discussed with: Patient

## 2017-05-25 NOTE — PERIOP NOTES
TRANSFER - OUT REPORT:    Verbal report given to Socorro(name) on Lachelle Ricci  being transferred to [facility](unit) for routine progression of care       Report consisted of patients Situation, Background, Assessment and   Recommendations(SBAR). Time Pre op antibiotic [facility]:7890  Anesthesia Stop time: 3528  Nicole Present on Transfer to floor:n/a  Order for Nicole on Chart:n/a    Information from the following report(s) SBAR, OR Summary, Intake/Output and MAR was reviewed with the receiving nurse. Opportunity for questions and clarification was provided. Is the patient on 02? NO       L/Min 0       Other 0    Is the patient on a monitor? NO    Is the nurse transporting with the patient? NO    Surgical Waiting Area notified of patient's transfer from PACU? YES      The following personal items collected during your admission accompanied patient upon transfer:   Dental Appliance: Dental Appliances: None  Vision: Visual Aid: Glasses  Hearing Aid:    Jewelry: Jewelry: None  Clothing: Clothing:  (bag of clothing signed in)  Other Valuables:  Other Valuables: Eyeglasses (given  to mother)  Valuables sent to safe:

## 2017-05-25 NOTE — PROGRESS NOTES
Problem: Gastric Sleeve Pathway / Bariatric Revision Pathway: Day of Surgery  Goal: *No signs and symptoms of infection or wound complications  Outcome: Progressing Towards Goal  Surgical sites are clean dry and intact. Goal: *Optimal pain control at patients stated goal  Outcome: Progressing Towards Goal  Patient's pain is being controlled with the use of scheduled and prn pain medication. Goal: *Adequate urinary output (equal to or greater than 30 milliliters/hour)  Outcome: Progressing Towards Goal  Patient able to void spontaneously. Goal: *Hemodynamically stable  Outcome: Progressing Towards Goal  Patient with stable vitals throughout shift. Goal: *Demonstrates progressive activity  Outcome: Progressing Towards Goal  Patient up walking in hallway multiple times today. Goal: *Oxygen saturation within defined limits  Outcome: Progressing Towards Goal  Patient maintain oxygen saturation of >94% on room air.

## 2017-05-26 VITALS
DIASTOLIC BLOOD PRESSURE: 81 MMHG | TEMPERATURE: 98.4 F | HEIGHT: 69 IN | RESPIRATION RATE: 16 BRPM | WEIGHT: 293 LBS | BODY MASS INDEX: 43.4 KG/M2 | HEART RATE: 70 BPM | OXYGEN SATURATION: 100 % | SYSTOLIC BLOOD PRESSURE: 151 MMHG

## 2017-05-26 LAB
ANION GAP BLD CALC-SCNC: 12 MMOL/L (ref 5–15)
BUN SERPL-MCNC: 9 MG/DL (ref 6–20)
BUN/CREAT SERPL: 16 (ref 12–20)
CALCIUM SERPL-MCNC: 9 MG/DL (ref 8.5–10.1)
CHLORIDE SERPL-SCNC: 109 MMOL/L (ref 97–108)
CO2 SERPL-SCNC: 16 MMOL/L (ref 21–32)
CREAT SERPL-MCNC: 0.56 MG/DL (ref 0.55–1.02)
ERYTHROCYTE [DISTWIDTH] IN BLOOD BY AUTOMATED COUNT: 13.2 % (ref 11.5–14.5)
GLUCOSE SERPL-MCNC: 84 MG/DL (ref 65–100)
HCT VFR BLD AUTO: 37.2 % (ref 35–47)
HGB BLD-MCNC: 12.5 G/DL (ref 11.5–16)
MCH RBC QN AUTO: 29.4 PG (ref 26–34)
MCHC RBC AUTO-ENTMCNC: 33.6 G/DL (ref 30–36.5)
MCV RBC AUTO: 87.5 FL (ref 80–99)
PLATELET # BLD AUTO: 139 K/UL (ref 150–400)
POTASSIUM SERPL-SCNC: 4.5 MMOL/L (ref 3.5–5.1)
RBC # BLD AUTO: 4.25 M/UL (ref 3.8–5.2)
SODIUM SERPL-SCNC: 137 MMOL/L (ref 136–145)
WBC # BLD AUTO: 11.5 K/UL (ref 3.6–11)

## 2017-05-26 PROCEDURE — 80048 BASIC METABOLIC PNL TOTAL CA: CPT | Performed by: SURGERY

## 2017-05-26 PROCEDURE — 74011250636 HC RX REV CODE- 250/636: Performed by: SURGERY

## 2017-05-26 PROCEDURE — 36415 COLL VENOUS BLD VENIPUNCTURE: CPT | Performed by: SURGERY

## 2017-05-26 PROCEDURE — 74011250637 HC RX REV CODE- 250/637: Performed by: PHYSICIAN ASSISTANT

## 2017-05-26 PROCEDURE — 85027 COMPLETE CBC AUTOMATED: CPT | Performed by: SURGERY

## 2017-05-26 PROCEDURE — 74011250637 HC RX REV CODE- 250/637: Performed by: SURGERY

## 2017-05-26 RX ORDER — BUSPIRONE HYDROCHLORIDE 10 MG/1
10 TABLET ORAL 3 TIMES DAILY
Status: DISCONTINUED | OUTPATIENT
Start: 2017-05-26 | End: 2017-05-26 | Stop reason: HOSPADM

## 2017-05-26 RX ORDER — DEXTROAMPHETAMINE SACCHARATE, AMPHETAMINE ASPARTATE, DEXTROAMPHETAMINE SULFATE AND AMPHETAMINE SULFATE 2.5; 2.5; 2.5; 2.5 MG/1; MG/1; MG/1; MG/1
20 TABLET ORAL DAILY
Status: DISCONTINUED | OUTPATIENT
Start: 2017-05-26 | End: 2017-05-26 | Stop reason: HOSPADM

## 2017-05-26 RX ORDER — DEXTROAMPHETAMINE SACCHARATE, AMPHETAMINE ASPARTATE, DEXTROAMPHETAMINE SULFATE AND AMPHETAMINE SULFATE 5; 5; 5; 5 MG/1; MG/1; MG/1; MG/1
20 TABLET ORAL DAILY
COMMUNITY

## 2017-05-26 RX ORDER — HYDROMORPHONE HYDROCHLORIDE 2 MG/1
2-4 TABLET ORAL
Qty: 32 TAB | Refills: 0 | Status: SHIPPED | OUTPATIENT
Start: 2017-05-26 | End: 2018-02-01 | Stop reason: ALTCHOICE

## 2017-05-26 RX ORDER — DEXTROAMPHETAMINE SACCHARATE, AMPHETAMINE ASPARTATE MONOHYDRATE, DEXTROAMPHETAMINE SULFATE AND AMPHETAMINE SULFATE 1.25; 1.25; 1.25; 1.25 MG/1; MG/1; MG/1; MG/1
20 CAPSULE, EXTENDED RELEASE ORAL 2 TIMES DAILY
Status: DISCONTINUED | OUTPATIENT
Start: 2017-05-26 | End: 2017-05-26 | Stop reason: CLARIF

## 2017-05-26 RX ORDER — ACETAMINOPHEN 325 MG/1
650 TABLET ORAL ONCE
Status: COMPLETED | OUTPATIENT
Start: 2017-05-26 | End: 2017-05-26

## 2017-05-26 RX ORDER — LORAZEPAM 0.5 MG/1
0.5 TABLET ORAL
Qty: 15 TAB | Refills: 0 | Status: SHIPPED | OUTPATIENT
Start: 2017-05-26

## 2017-05-26 RX ADMIN — BUSPIRONE HYDROCHLORIDE 10 MG: 10 TABLET ORAL at 09:09

## 2017-05-26 RX ADMIN — KETOROLAC TROMETHAMINE 15 MG: 30 INJECTION, SOLUTION INTRAMUSCULAR at 11:43

## 2017-05-26 RX ADMIN — Medication 10 ML: at 14:00

## 2017-05-26 RX ADMIN — ACETAMINOPHEN 1000 MG: 10 INJECTION, SOLUTION INTRAVENOUS at 01:47

## 2017-05-26 RX ADMIN — ACETAMINOPHEN 650 MG: 325 TABLET, FILM COATED ORAL at 15:21

## 2017-05-26 RX ADMIN — HYDROMORPHONE HYDROCHLORIDE 2 MG: 2 TABLET ORAL at 09:23

## 2017-05-26 RX ADMIN — HYOSCYAMINE SULFATE 125 MCG: 0.12 LIQUID ORAL at 10:23

## 2017-05-26 RX ADMIN — Medication 10 ML: at 05:18

## 2017-05-26 RX ADMIN — KETOROLAC TROMETHAMINE 15 MG: 30 INJECTION, SOLUTION INTRAMUSCULAR at 05:18

## 2017-05-26 RX ADMIN — HYDROMORPHONE HYDROCHLORIDE 4 MG: 2 TABLET ORAL at 15:55

## 2017-05-26 RX ADMIN — HYDROMORPHONE HYDROCHLORIDE 2 MG: 2 TABLET ORAL at 10:46

## 2017-05-26 RX ADMIN — ENOXAPARIN SODIUM 40 MG: 40 INJECTION SUBCUTANEOUS at 09:09

## 2017-05-26 RX ADMIN — ONDANSETRON 4 MG: 2 INJECTION INTRAMUSCULAR; INTRAVENOUS at 10:29

## 2017-05-26 NOTE — DISCHARGE INSTRUCTIONS
[facility] Surgery at [facility]  Procedure:  Laparoscopic Sleeve Gastrectomy        You have  a 2 week follow up appointment  6/8/2017 10:40 AM KARSTEN Wu 03 [phone]            It is common to experience reflux or heartburn after sleeve gastrectomy. These symptoms are generally self-limited and resolve over several weeks to couple of months. your doctor may have prescribed medication to prevent these symptoms. If you experience reflux and feel it is limiting your fluid intake call your doctor immediately so that it can be effectively treated. Please remember that you are on ONLY LIQUIDS for the first 2 weeks after surgery until you are seen in our office. You should be drinking plenty of no calorie, non carbonated liquids through out the day and your meals are going to be a shake, the same type you did for your 2 week pre-op diet. Do not advance your diet until you are seen in our office. [address] [place] [address]  [phone]      Laparoscopic Sleeve Gastrectomy: What to Expect at [address] [place] [address]  A sleeve gastrectomy is a  surgery to make the stomach smaller. It is done to help people lose weight. The surgery limits the amount of food the stomach can hold. This helps you eat less and feel full sooner. The cuts (incisions) the doctor made in your belly will probably be sore for several days after the surgery. You probably will lose weight very quickly in the first few months after surgery. As time goes on, your weight loss will slow down. You can expect most of your weight loss to happen in the first 12 months after your surgery. You will have regular doctor's appointments during this time to check how you are doing. It is important to think of this surgery as a tool to help you lose weight. It is not an instant fix.  You will still need to eat a healthy diet and get regular exercise. This will help you reach your weight goal and avoid regaining the weight you lose. It is common to have many different emotions after this surgery. You may feel happy or excited as you begin to lose weight. But you may also feel overwhelmed or frustrated by the changes that you have to make in your diet, activity, and lifestyle. Talk with your doctor if you have concerns or questions. This care sheet gives you a general idea about how long it will take for you to recover. But each person recovers at a different pace. Follow the steps below to get better as quickly as possible. How can you care for yourself at home? Activity  · Rest when you feel tired. Getting enough sleep will help you recover. · Try to walk each day. Start by walking a little more than you did the day before. Bit by bit, increase the amount you walk. Walking boosts blood flow and helps prevent pneumonia and constipation. · Avoid strenuous activities, such as bicycle riding, jogging, weight lifting, or aerobic exercise, until your doctor says it is okay. · Until your doctor says it is okay, avoid lifting anything that would make you strain. This may include heavy grocery bags and milk containers, a heavy briefcase or backpack, cat litter or dog food bags, a vacuum , or a child. · Hold a pillow over your incisions when you cough or take deep breaths. This will support your belly and decrease your pain. · Do breathing exercises at home as instructed by your doctor. This will help prevent pneumonia. · You can drive when you are no longer taking narcotic pain medications and feel comfortable sitting in a car. · You will probably need to take 2 to 4 weeks off from work. It depends on the type of work you do and how you feel. You will probably return to normal activities within 3 to 5 weeks. · You may shower, if your doctor okays it. Pat the incisions dry.  Do not take a bath for the first 2 weeks, or until your doctor tells you it is okay. Diet  · Your doctor will give you specific instructions about what to eat after the surgery. For the first 2 weeks, you will need to follow a liquid diet only. DO NOT ADVANCE TO SOFT FOOD UNTIL CLEARED BY YOUR DOCTOR. · Your doctor may recommend that you work with a dietitian to plan healthy meals that give you enough protein, vitamins, and minerals while you are losing weight. Even with a healthy diet, you probably will need to take vitamin and mineral supplements for the rest of your life. · At first you may feel full after just a few sips of water or other liquid. It is important to try to sip water throughout the day to avoid becoming dehydrated. · You may notice that your bowel movements are not regular right after your surgery. This is common. Try to avoid constipation and straining with bowel movements. · Sometimes the stomach empties food into the small intestine too quickly. This is called dumping syndrome. It can cause diarrhea and make you feel faint, shaky, and nauseated. It also can make it hard for your body to get enough nutrition. ¨  Avoid high-sugar foods, or use products that have artificial sweeteners. ¨ Do not drink liquids within a half hour before eating and up to an hour after eating. ¨   ¨ Eat slowly. Try to chew each bite about 20 times. Allow 20 to 30 minutes for each meal.   Eat 3-4 4 ounce small meals or snacks a day. Medicines  · Take pain medicines exactly as directed. ¨ If the doctor gave you a prescription medicine for pain, take it as prescribed. ¨ If you are not taking a prescription pain medicine, ask your doctor if you can take an over-the-counter medicine. ¨ Do not take two or more pain medicines at the same time unless the doctor told you to. Many pain medicines have acetaminophen, which is Tylenol. Too much acetaminophen (Tylenol) can be harmful.   · If you think your pain medicine is making you sick to your stomach:  ¨ Take your medicine after meals (unless your doctor has told you not to). Incision care  · Your incisions have a waterproof clue dressing on them that will peel off in 2 weeks. · Wash the area daily with warm, soapy water, and pat it dry. Other cleaning products, such as hydrogen peroxide, can make the wound heal more slowly. You may cover the area with a gauze bandage if it weeps or rubs against clothing. Change the bandage every day. · Keep the area clean and dry. Follow-up care is a key part of your treatment and safety. Be sure to make and go to all appointments, and call your doctor if you are having problems. Itâs also a good idea to know your test results and keep a list of the medicines you take. When should you call for help? Call 911 anytime you think you may need emergency care. For example, call if:  · You pass out (lose consciousness). · You have severe trouble breathing. · You have sudden chest pain and shortness of breath, or you cough up blood. · You have severe belly pain. Call your doctor now or seek immediate medical care if:  · You are sick to your stomach or cannot keep fluids down. · You have pain that does not get better after you take pain medicine. · You have a fever over 101°F.  · You have loose stitches, or any of your incisions come open. · Bright red blood has soaked through the bandages over your incisions. · You have signs of infection, such as:  ¨ Increased pain, swelling, warmth, or redness. ¨ Red streaks leading from the incisions. ¨ Pus draining from the incision. ¨ Swollen lymph nodes in your neck, armpits, or groin. ¨ A fever. · You have signs of needing more fluids. You have sunken eyes and a dry mouth, and you pass only a little dark urine. Watch closely for changes in your health, and be sure to contact your doctor if you have any problems. Where can you learn more? Go to [facility].be.   Enter Y354 in the search box to learn more about \"Laparoscopic Yaakov-en-Y Gastric Bypass: What to Expect at Home. \"   © 2926-5288 [employer], [employer]. Care instructions adapted under license by Brad Martinez (which disclaims liability or warranty for this information). This care instruction is for use with your licensed healthcare professional. If you have questions about a medical condition or this instruction, always ask your healthcare professional. Rich Or any warranty or liability for your use of this information.

## 2017-05-26 NOTE — PROGRESS NOTES
Bedside and Verbal shift change report given to dandre (oncoming nurse) by Freddy Andrews (offgoing nurse). Report included the following information SBAR, Kardex, OR Summary, Procedure Summary, Intake/Output, MAR, Recent Results and Cardiac Rhythm nsr.

## 2017-05-26 NOTE — PROGRESS NOTES
Progress Note    Patient: Kian Nielsen MRN: [id number]  SSN: [id number]    YOB: 1980  Age: 39 y.o. Sex: female      Admit Date: 2017    1 Day Post-Op    Procedure:  Procedure(s):  LAPAROSCOPIC SLEEVE GASTRECTOMY WITH EGD  ESOPHAGOGASTRODUODENOSCOPY (EGD)    Subjective:     Patient has good pain and nausea control; ambulating, voiding spontaneous. Objective:     Visit Vitals    /81 (BP 1 Location: Right arm, BP Patient Position: At rest)    Pulse 93    Temp 98.2 °F (36.8 °C)    Resp 20    Ht 5' 9\" (1.753 m)    Wt 293 lb 3.4 oz (133 kg)    SpO2 100%    BMI 43.3 kg/m2       Temp (24hrs), Av °F (36.7 °C), Min:97.7 °F (36.5 °C), Max:98.2 °F (36.8 °C)      Physical Exam:    LUNG: clear to auscultation bilaterally, HEART: regular rate and rhythm, S1, S2 normal, no murmur. ABDOMEN: Obese, non-distended, soft; wounds dry and intact; sero-sanguinous drain fluid; appropriate incisional pain with palpation.     Data Review: VS, I/O's, Labs    Lab Review:   BMP:   Lab Results   Component Value Date/Time     2017 04:29 AM    K 4.5 2017 04:29 AM     (H) 2017 04:29 AM    CO2 16 (L) 2017 04:29 AM    AGAP 12 2017 04:29 AM    GLU 84 2017 04:29 AM    BUN 9 2017 04:29 AM    CREA 0.56 2017 04:29 AM    GFRAA >60 2017 04:29 AM    GFRNA >60 2017 04:29 AM     CMP:   Lab Results   Component Value Date/Time     2017 04:29 AM    K 4.5 2017 04:29 AM     (H) 2017 04:29 AM    CO2 16 (L) 2017 04:29 AM    AGAP 12 2017 04:29 AM    GLU 84 2017 04:29 AM    BUN 9 2017 04:29 AM    CREA 0.56 2017 04:29 AM    GFRAA >60 2017 04:29 AM    GFRNA >60 2017 04:29 AM    CA 9.0 2017 04:29 AM     CBC:   Lab Results   Component Value Date/Time    WBC 11.5 (H) 2017 04:29 AM    HGB 12.5 2017 04:29 AM    HCT 37.2 2017 04:29 AM     (L) 2017 04:29 AM       Assessment:     Hospital Problems  Date Reviewed: 2017          Codes Class Noted POA    [facility] ICD-10-CM: E66.01  ICD-9-CM: 278.01  2017 Unknown              Plan/Recommendations/Medical Decision Makin. Oral analgesics. 2. Bariatric clear liquids; will add full liquids later. 3. Ambulation, spirometry. 4. DVT prophylaxis. 5. PTA medications.     Signed By: Denisse Yoder MD     May 26, 2017

## 2017-05-26 NOTE — DISCHARGE SUMMARY
Physician Discharge Summary     Patient ID:  Tay Larsen  [id number]  54 y.o.  1980    Admit Date: 5/25/2017    Discharge Date: 5/26/2017    * Admission Diagnoses: MORBID OBESITY  Morbid obesity ([facility]    * Discharge Diagnoses:    Hospital Problems as of 5/26/2017  Date Reviewed: 5/9/2017          Codes Class Noted - Resolved POA    Morbid obesity ([facility] ICD-10-CM: E66.01  ICD-9-CM: 278.01  5/25/2017 - Present Unknown               Admission Condition: Good    * Discharge Condition: good    * Procedures: Procedure(s):  LAPAROSCOPIC SLEEVE GASTRECTOMY WITH EGD  ESOPHAGOGASTRODUODENOSCOPY (EGD)    * Hospital Course:   Normal hospital course for this procedure. Pt started on clears and advanced to bariatric fulls without issue  Seen by Dietician to review diet and vitamins  Transitioned to oral analgesics and pain well controlled   Pt had SONIYA drain removed and DC to home  POD 1  doing well with follow up appointment in place        Consults: Nutrition     Significant Diagnostic Studies: na    * Disposition: Home    Discharge Medications:   Current Discharge Medication List      START taking these medications    Details   HYDROmorphone (DILAUDID) 2 mg tablet Take 1-2 Tabs by mouth every six (6) hours as needed for Pain. Max Daily Amount: 12 mg.  Qty: 32 Tab, Refills: 0      LORazepam (ATIVAN) 0.5 mg tablet Take 1 Tab by mouth every eight (8) hours as needed for Anxiety. Max Daily Amount: 1.5 mg.  Qty: 15 Tab, Refills: 0         CONTINUE these medications which have NOT CHANGED    Details   busPIRone (BUSPAR) 10 mg tablet Take 10 mg by mouth three (3) times daily. multivitamin (ONE A DAY) tablet Take 1 Tab by mouth daily. hyoscyamine SL (LEVSIN/SL) 0.125 mg SL tablet 1 Tab by SubLINGual route every four (4) hours as needed for Cramping. Qty: 30 Tab, Refills: 0      ondansetron (ZOFRAN ODT) 4 mg disintegrating tablet Take 1 Tab by mouth every eight (8) hours as needed for Nausea.   Qty: 30 Tab, Refills: 0 Omeprazole delayed release (PRILOSEC D/R) 20 mg tablet Take 1 Tab by mouth daily. Qty: 30 Tab, Refills: 5      ergocalciferol (ERGOCALCIFEROL) 50,000 unit capsule Take 1 Cap by mouth every Monday and Friday. Qty: 8 Cap, Refills: 2         STOP taking these medications       ibuprofen (ADVIL) 200 mg tablet Comments:   Reason for Stopping:               * Follow-up Care/Patient Instructions:   Activity: No driving while on analgesics and No heavy lifting for 4 weeks  Diet: Bariatric full liquids for 2 weeks post op  Wound Care: Keep wound clean and dry    Future Appointments  Date Time Provider [facility] [place]   6/8/2017 10:40 AM Duane Churn, NP [facility] SCHED   6/22/2017 10:40 AM Duane Churn, NP [facility] SCHED   7/6/2017 10:20 AM Duane Churn, NP [place]           Follow-up Information     Follow up With Details Comments Alan Mcdaniels MD   [address] [address] [phone]          Follow-up tests/labs na    Signed:  OBED Pearce  5/26/2017  3:37 PM

## 2017-05-26 NOTE — PROGRESS NOTES
I have reviewed discharge instructions with the patient. The patient verbalized understanding. Patient awaiting volunteer assistance to be taken down to patient discharge.

## 2017-05-26 NOTE — PROGRESS NOTES
Problem: Patient Education: Go to Patient Education Activity  Goal: Patient/Family Education  Outcome: Resolved/Met Date Met:  05/26/17  NUTRITION          Post-op bariatric diet instruction completed. Can follow up for additional questions prn. Thanks.        Janice Olmstead RD

## 2017-05-29 NOTE — PROGRESS NOTES
Spiritual Care Partner Volunteer visited patient in [address] [place] [address] on 5/26/17. Documented by:  Catherine Barrientos M.Div.    Paging Service [phone])

## 2017-05-30 ENCOUNTER — TELEPHONE (OUTPATIENT)
Dept: SURGERY | Age: 37
End: 2017-05-30

## 2017-05-30 NOTE — TELEPHONE ENCOUNTER
----- Message from Dontrell Goldberg LPN sent at 3/78/0587  4:08 PM EDT -----  Regarding: FW: 48 hour po call FW: gastric sleeve DC home Fri 5/26      ----- Message -----     From: Dontrell Goldberg LPN     Sent: 2/31/7776   3:57 PM       To: Arben Moore LPN  Subject: 48 hour po call FW: gastric sleeve DC home F#        ----- Message -----     From: OBED Whittaker     Sent: 5/26/2017   3:34 PM       To: Arben Moore LPN  Subject: gastric sleeve DC home Fri 5/26                  Bariatric Discharge Notice for Follow Up Call    Admit Date: 5/25    Pt is POD #1 s/p laparoscopic sleeve gastrectomy and is being discharged to home today Fri 5/26 after a routine post op course.       Thanks,  Edmond Ying PA-C

## 2017-05-30 NOTE — TELEPHONE ENCOUNTER
Bariatric Post-Operative Phone Calls: 48 hour phone call    Diet:Question of any nausea and/or vomiting. Protein intake (goal is 60 grams of protein daily)   Poor____Fair____Good_x___Great__x__     Comment:__3 CIB shakes____________________________________________________________      ______________________________________________________________________    Hydration:Less than 32 ounces of water daily is fair to poor (Goal is 64 ounces per day)   Poor____ Fair____ Good__x__Great____    Comment:______________________________________________________________    ______________________________________________________________________      Ambulation:( walking at least 3 x week, for 15- 20 minutes)     Poor______ Fair______ Good______     Great__x____ Comment:__________________________________________________    ______________________________________________________________________      Urine Color: Question of any odor and color(should be sandra, pale, and clear) Dark______ Amber______ Pale___x___      Clear______ Comment:___________________________________________________                           ________________________________________________________________    Bowel movements: Question of any constipation- haven't had any bowel movements for more than 3 days. This could be related to protein intake and/or narcotic pain medication usage. Comment:                                                                             Had a small bm, taking Colace and drank smooth move tea, passing gas                                                 Pain: Left sided abdominal pain is normal (should be less than 3)  Question if pain medication is helpful.  10___ 9___ 8___ 7___ 6___ 5___ 4___ 3___     2_x__1___0___Comment:_________________________________________________    ______________________________________________________________________      Incision: (No redness, pain, swelling or fever) Healing Well__x____ Healed______Redness_________ Pain_________     Swelling_________ Fever__________(greater than 101 needs evaluation)    Comment:____________________________________________________________    ______________________________________________________________________  Use of incentive spirometer: Yes____       No   x        Next Appointment:__6/8/17____________                 Support Group: Yes______No__x____    Additional Comments:____________________________________________________________    ____________________________________________________________________      If more than one parameter is not met or considered poor, nurse needs to discuss with provider recommend for patient to be seen in the office as soon as possible or refer to the provider for follow-up. Reinforce to patient to use bariatric educational booklet as guide. It is appropriate to refer patient to the nutritionist to discuss more in detail of diet and nutrition.

## 2017-05-31 ENCOUNTER — TELEPHONE (OUTPATIENT)
Dept: SURGERY | Age: 37
End: 2017-05-31

## 2017-05-31 NOTE — TELEPHONE ENCOUNTER
I called the patient and she wanted to know if she needed to be on Lovenox and I spoke to Madeline Jc NP and she said no she did not BMI is under 50. She then said she felt jittery and had low energy at times through out the day and wanted to know if this was normal. She said she started taking her Adderall today. She said she is doing CIB and 1 Premier shake, I told her it could be to low carbs, I told her to try 20z apple juice with 2 ounces of water and see if that helped. She wanted to no if she could go and have a massage today and I told her yes. 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 I have provided letter for her to  and I told her to call me back if she has any further problems. Pt in agreement.

## 2017-05-31 NOTE — LETTER
5/31/2017 1:28 PM 
 
Ms. Zahida Perez 
[address] [facility] [id number] To whom it may concern: 
 
 
 
Jun Valderrama has been under our care and she is released to return to work on Monday June 5, 2017. If you have any questions have the patient call our office. Sincerely, 
 
 
Claudia Wagner MD/vinod

## 2017-05-31 NOTE — ANESTHESIA POSTPROCEDURE EVALUATION
Post-Anesthesia Evaluation and Assessment    Patient: Keven Gimenez MRN: [id number]  SSN: [id number]    YOB: 1980  Age: 39 y.o. Sex: female       Cardiovascular Function/Vital Signs  Visit Vitals    /81 (BP 1 Location: Left arm, BP Patient Position: At rest)    Pulse 70    Temp 36.9 °C (98.4 °F)    Resp 16    Ht 5' 9\" (1.753 m)    Wt 133 kg (293 lb 3.4 oz)    SpO2 100%    BMI 43.3 kg/m2       Patient is status post general anesthesia for Procedure(s):  LAPAROSCOPIC SLEEVE GASTRECTOMY WITH EGD  ESOPHAGOGASTRODUODENOSCOPY (EGD). Nausea/Vomiting: None    Postoperative hydration reviewed and adequate. Pain:  Pain Scale 1: Numeric (0 - 10) (05/26/17 1600)  Pain Intensity 1: 2 (05/26/17 1600)   Managed    Neurological Status:   Neuro (WDL): Within Defined Limits (05/25/17 1100)  Neuro  Neurologic State: Alert; Appropriate for age (05/26/17 0800)  Orientation Level: Oriented X4 (05/26/17 0800)  Cognition: Appropriate decision making; Appropriate for age attention/concentration; Appropriate safety awareness (05/26/17 0800)  Speech: Clear; Appropriate for age (05/26/17 0800)   At baseline    Mental Status and Level of Consciousness: Arousable    Pulmonary Status:   O2 Device: Room air (05/26/17 0800)   Adequate oxygenation and airway patent    Complications related to anesthesia: None    Post-anesthesia assessment completed.  No concerns    Signed By: Francisca Baptiste MD     May 30, 2017

## 2017-06-01 ENCOUNTER — OFFICE VISIT (OUTPATIENT)
Dept: SURGERY | Age: 37
End: 2017-06-01

## 2017-06-01 ENCOUNTER — TELEPHONE (OUTPATIENT)
Dept: SURGERY | Age: 37
End: 2017-06-01

## 2017-06-01 VITALS
BODY MASS INDEX: 41.4 KG/M2 | WEIGHT: 279.5 LBS | RESPIRATION RATE: 18 BRPM | TEMPERATURE: 97.9 F | SYSTOLIC BLOOD PRESSURE: 110 MMHG | DIASTOLIC BLOOD PRESSURE: 90 MMHG | HEART RATE: 120 BPM | HEIGHT: 69 IN | OXYGEN SATURATION: 99 %

## 2017-06-01 DIAGNOSIS — Z09 SURGERY FOLLOW-UP: ICD-10-CM

## 2017-06-01 DIAGNOSIS — E66.01 MORBID OBESITY DUE TO EXCESS CALORIES (HCC): Primary | ICD-10-CM

## 2017-06-01 NOTE — TELEPHONE ENCOUNTER
Patient called today to see if she can get her blood pressure checked when she comes to  her letter.

## 2017-06-01 NOTE — TELEPHONE ENCOUNTER
Patient came to the office and she  her RTW letter and she wanted her blood pressure checked. /94. Commented is seems like a long time to wait 2 weeks after surgery to be seen. I offered her an appointment this afternoon to be seen by NP to go over her post op progress. Pt being seen this afternoon.

## 2017-06-01 NOTE — PROGRESS NOTES
1 weeks status post Sleeve gastrectomy   Pt reports doing well on liquids . She is drinking over 60 oz of protein shake. Pt's post op pain is none. Pt reports no nausea and no vomiting  Sheis drinking approximately 64 oz of water daily. She is tearful in the office. Her mom just flew home and her dad is staying with her this week. She states that last night she was uncontrollably crying. She does not feel that she is \"mourning\" food but that she was sad her mom left. She is tolerating the diet well. She is sleeping well. Total weight loss since surgery 18 lbs  Weight loss since last visit 18 lbs    Visit Vitals    /90 (BP 1 Location: Left arm, BP Patient Position: Sitting)    Pulse (!) 120    Temp 97.9 °F (36.6 °C) (Oral)    Resp 18    Ht 5' 9\" (1.753 m)    Wt 279 lb 8 oz (126.8 kg)    LMP  (Within Weeks)    SpO2 99%    BMI 41.27 kg/m2        Patient has an advanced directive: NO      Ms. Dionna Noriega has a reminder for a \"due or due soon\" health maintenance. I have asked that she contact her primary care provider for follow-up on this health maintenance. Physical Examination: General appearance - alert, well appearing,  tearful  Chest - clear to auscultation bilaterally  Heart - normal rate, regular rhythm, normal S1, S2, no murmurs, rubs, clicks or gallops  Abdomen - soft, nontender, nondistended  scars from previous incisions healing without erythema or induration    A/P    Doing well 1 weeks  Status post laparoscopic Sleeve gastrectomy  Reassured patient that she is doing well. Continue bariatric full liquids and clear liquids. Her Dad is with her all week and she is happy about that. Call if any questions or concern arise. Follow up in 1 week for diet advancment. Pt verbalized understanding and questions were answered to the best of my knowledge and ability.       Izaiah Loco NP

## 2017-06-01 NOTE — PATIENT INSTRUCTIONS
Sleeve Gastrectomy: What to Expect at Home  Your Recovery  Sleeve gastrectomy is surgery to remove part of the stomach to help with weight loss. The surgery limits the amount of food your stomach can hold. You will have some belly pain and may need pain medicine for the first week or so after surgery. The cuts (incisions) that the doctor made may be tender and sore. Because the surgery makes your stomach smaller, you will get full more quickly when you eat. It is important to think of this surgery as a tool to help you lose weight. It is not an instant fix. You will still need to eat a healthy diet and get regular exercise. This will help you reach your weight goal and avoid regaining the weight you lose. It is common to have many different emotions after this surgery. You may feel happy or excited as you begin to lose weight. But you may also feel overwhelmed or frustrated by the changes that you have to make in your diet, activity, and lifestyle. Talk with your doctor if you have concerns or questions. This care sheet gives you a general idea about how long it will take for you to recover. But each person recovers at a different pace. Follow the steps below to get better as quickly as possible. How can you care for yourself at home? Activity  · Rest when you feel tired. Getting enough sleep will help you recover. · Try to walk each day. Start out by walking a little more than you did the day before. Bit by bit, increase the amount you walk. Walking boosts blood flow and helps prevent pneumonia and constipation. · Avoid lifting anything that would make you strain. This may include heavy grocery bags and milk containers, a heavy briefcase or backpack, cat litter or dog food bags, a vacuum , or a child. · Avoid strenuous activities, such as bicycle riding, jogging, weight lifting, or aerobic exercise, until your doctor says it is okay.  Do not take part in any activity where you could be hit in the belly. This could be sports or playing with children. · Hold a pillow over your incision when you cough or take deep breaths. This will support your belly and decrease your pain. · Do breathing exercises at home as instructed by your doctor. This will help prevent pneumonia. · You can shower. Pat the incision dry. Do not take a bath for the first 2 weeks, or until your doctor tells you it is okay. · You may drive when you are no longer taking prescription pain medicine and can quickly move your foot from the gas pedal to the brake. You must also be able to sit comfortably for a long period of time, even if you do not plan to go far. You might get caught in traffic. · You will probably need to take 2 to 4 weeks off from work. It depends on the type of work you do and how you feel. · Ask your doctor when it is okay for you to have sex. Diet  · For the first week, stay on a liquid or soft diet. This includes broths, soups, milk shakes, puddings, and mashed potatoes. When you can eat these without difficulty, try other soft, solid foods, such as ground meat, shredded chicken, fish, pasta, and well-cooked vegetables and canned fruits. · Have 5 or 6 small meals each day instead of 2 or 3 large meals. Chew each bite of food very well. Eat slowly. You may need to take 20 to 30 minutes to eat a meal.  · Avoid crusty breads, bagels, tough meats, raw vegetables, nuts and seeds (including crackers and breads that have nuts and seeds), and other foods that are hard to digest. If you feel full quickly, try to drink fluids between meals instead of with meals. · Avoid carbonated beverages, such as soda pop. · Avoid drinking with straws. This may help you swallow less air when you drink. · Gradually return to your normal foods. This usually takes 4 to 6 weeks. · Check with your doctor before drinking alcohol. Your body may absorb alcohol more quickly after surgery.   · You may notice that your bowel movements are not regular right after your surgery. This is common. Try to avoid constipation and straining with bowel movements. Take a fiber supplement every day. If you have not had a bowel movement after a couple of days, ask your doctor about taking a mild laxative. Medicines  · Your doctor will tell you if and when you can restart your medicines. He or she will also give you instructions about taking any new medicines. · If you take blood thinners, such as warfarin (Coumadin), clopidogrel (Plavix), or aspirin, be sure to talk to your doctor. He or she will tell you if and when to start taking those medicines again. Make sure that you understand exactly what your doctor wants you to do. · Take pain medicines exactly as directed. ¨ If the doctor gave you a prescription medicine for pain, take it as prescribed. ¨ Do not take two or more pain medicines at the same time unless the doctor told you to. Many pain medicines contain acetaminophen, which is Tylenol. Too much acetaminophen (Tylenol) can be harmful. ¨ Do not take aspirin (Carmelita, Bufferin), ibuprofen (Advil, Motrin), or naproxen (Aleve) until your doctor says it is okay. · If you think your pain medicine is making you sick to your stomach:  ¨ Take your medicine after meals (unless your doctor has told you not to). ¨ Ask your doctor for a different pain medicine. · If your doctor prescribed antibiotics, take them as directed. Do not stop taking them just because you feel better. You need to take the full course of antibiotics. Incision care  · If you have strips of tape on the incision, leave the tape on for a week or until it falls off. · Wash the area daily with warm, soapy water and pat it dry. Don't use hydrogen peroxide or alcohol, which can slow healing. You may cover the area with a gauze bandage if it weeps or rubs against clothing. Change the bandage every day. · Keep the area clean and dry. Follow-up care is a key part of your treatment and safety.  Be sure to make and go to all appointments, and call your doctor if you are having problems. It's also a good idea to know your test results and keep a list of the medicines you take. When should you call for help? Call 911 anytime you think you may need emergency care. For example, call if:  · You passed out (lost consciousness). · You have severe trouble breathing. · You have sudden chest pain and shortness of breath, or you cough up blood. · You have severe pain in your belly or chest.  Call your doctor now or seek immediate medical care if:  · You are sick to your stomach or cannot keep fluids down. · You have pain that does not get better after you take pain medicine. · You have signs of infection, such as:  ¨ Increased pain, swelling, warmth, or redness. ¨ Red streaks leading from the incision. ¨ Pus draining from the incision. ¨ Swollen lymph nodes in your neck, armpits, or groin. ¨ A fever. · You have loose stitches, or your incision comes open. · You have signs of a blood clot, such as:  ¨ Pain in your calf, back of the knee, thigh, or groin. ¨ Redness and swelling in your leg or groin. · You have trouble passing urine or stool, especially if you have pain or swelling in your lower belly. Watch closely for changes in your health, and be sure to contact your doctor if:  · You have trouble eating. · You have symptoms of dumping syndrome, such as feeling faint, shaky, and nauseated or having diarrhea after you eat. · You do not have a bowel movement after taking a laxative. Where can you learn more? Go to http://jessee-gonzalo.info/. Enter [phone] in the search box to learn more about \"Sleeve Gastrectomy: What to Expect at Home. \"  Current as of: October 13, 2016  Content Version: 11.2  © 8767-6392 [employer]. Care instructions adapted under license by [employer] (which disclaims liability or warranty for this information).  If you have questions about a medical condition or this instruction, always ask your healthcare professional. [place] [address] any warranty or liability for your use of this information.

## 2017-06-01 NOTE — PROGRESS NOTES
1. Have you been to the ER, urgent care clinic since your last visit? Hospitalized since your last visit? No    2. Have you seen or consulted any other health care providers outside of the [address] [place] [address] since your last visit? Include any pap smears or colon screening.  No

## 2017-06-08 ENCOUNTER — OFFICE VISIT (OUTPATIENT)
Dept: SURGERY | Age: 37
End: 2017-06-08

## 2017-06-08 VITALS
RESPIRATION RATE: 16 BRPM | TEMPERATURE: 98.9 F | SYSTOLIC BLOOD PRESSURE: 120 MMHG | DIASTOLIC BLOOD PRESSURE: 84 MMHG | WEIGHT: 274.5 LBS | HEART RATE: 87 BPM | HEIGHT: 69 IN | BODY MASS INDEX: 40.66 KG/M2 | OXYGEN SATURATION: 98 %

## 2017-06-08 DIAGNOSIS — Z09 SURGERY FOLLOW-UP: ICD-10-CM

## 2017-06-08 DIAGNOSIS — E66.01 MORBID OBESITY DUE TO EXCESS CALORIES (HCC): Primary | ICD-10-CM

## 2017-06-08 NOTE — PATIENT INSTRUCTIONS
Constipation: Care Instructions  Your Care Instructions  Constipation means that you have a hard time passing stools (bowel movements). People pass stools from 3 times a day to once every 3 days. What is normal for you may be different. Constipation may occur with pain in the rectum and cramping. The pain may get worse when you try to pass stools. Sometimes there are small amounts of bright red blood on toilet paper or the surface of stools. This is because of enlarged veins near the rectum (hemorrhoids). A few changes in your diet and lifestyle may help you avoid ongoing constipation. Your doctor may also prescribe medicine to help loosen your stool. Some medicines can cause constipation. These include pain medicines and antidepressants. Tell your doctor about all the medicines you take. Your doctor may want to make a medicine change to ease your symptoms. Follow-up care is a key part of your treatment and safety. Be sure to make and go to all appointments, and call your doctor if you are having problems. It's also a good idea to know your test results and keep a list of the medicines you take. How can you care for yourself at home? · Drink plenty of fluids, enough so that your urine is light yellow or clear like water. If you have kidney, heart, or liver disease and have to limit fluids, talk with your doctor before you increase the amount of fluids you drink. · Include high-fiber foods in your diet each day. These include fruits, vegetables, beans, and whole grains. · Get at least 30 minutes of exercise on most days of the week. Walking is a good choice. You also may want to do other activities, such as running, swimming, cycling, or playing tennis or team sports. · Take a fiber supplement, such as Citrucel or Metamucil, every day. Read and follow all instructions on the label. · Schedule time each day for a bowel movement. A daily routine may help.  Take your time having your bowel movement. · Support your feet with a small step stool when you sit on the toilet. This helps flex your hips and places your pelvis in a squatting position. · Your doctor may recommend an over-the-counter laxative to relieve your constipation. Examples are Milk of Magnesia and MiraLax. Read and follow all instructions on the label. Do not use laxatives on a long-term basis. When should you call for help? Call your doctor now or seek immediate medical care if:  · You have new or worse belly pain. · You have new or worse nausea or vomiting. · You have blood in your stools. Watch closely for changes in your health, and be sure to contact your doctor if:  · Your constipation is getting worse. · You do not get better as expected. Where can you learn more? Go to http://ejssee-gonzalo.info/. Enter [id number] in the search box to learn more about \"Constipation: Care Instructions. \"  Current as of: May 27, 2016  Content Version: 11.2  © 1502-0640 [employer]. Care instructions adapted under license by [employer] (which disclaims liability or warranty for this information). If you have questions about a medical condition or this instruction, always ask your healthcare professional. [address] any warranty or liability for your use of this information. What you need to know:  1. Advance your diet to soft foods. Follow the handout that you were given today in the office. 2.  Take the recommended vitamins daily  3. No lifting greater than 20 lbs. 4.  You can do light jogging and walking. 5  Follow up in 2 weeks. 6.  You may go into a pool. 7.  If you are not able to tolerate liquids or soft foods. Please call our office. [phone]  8. If you have vomiting and persistent epigastric pain or chest pain. You should call our office, the doctor on-call or go to the emergency room. What to do if you are constipated:   You may  take Milk of Magnesia. Take 2 Tablespoons followed by 16 oz of water then 2 hours later take another 2 tablespoons. If  milk of magnesia does not work then take [place] or Miralax over the counter. Keep in mind that the Benefiber or Miralax may take a day or two to work. If all of the above do not work try a Fleets enema and follow the directions on the box. Soft and Mushy: Phase 1    Below is a list of basic items to purchase for the first phase of the   soft mushy diet. Your surgeon or nurse practitioner will inform you when it is okay   to advance to the next phase. Soft and Mushy Foods: Prepare food to the appropriate texture. ? Everything on clear and full liquid diet  ? Applesauce (no sugar added)  ? Hot & cold cereals (Cream of Wheat, Plain Cheerios®, Special K with protein®, plain oatmeal, grits)  ? Frozen or canned vegetables (carrots, acorn squash, butternut squash, string beans, spinach, broccoli, cauliflower - florets only!)   ? Canned fruit (in natural juice or with Splenda®)  ? Fat-free, cholesterol-free egg substitute (P)  ? Low-fat or fat-free cottage cheese (P)  ? Low-fat or fat-free yogurt (P)  ? Low-fat or fat-free [place] yogurt (P)  ? Fat-free milk or 1% milk (P)  ? Lactaid fat-free or 1% low fat milk (P)  ? Low-fat well-cooked/soft beans (the consistency of refried beans) (P)  ? No sugar added, low fat pudding (no pistachio or other flavor containing nuts)  ? low-fat cream soups  ? Low-fat chicken noodle or chicken rice soup (P)  ? Sugar-free fudgesicles  ? Sugar-free cocoa  ? Fat free whipped or mashed potatoes   ? Herbs and spices  ? Lite butter, margarine, canola oil, olive oil, reduced-fat or fat-free mayonnaise, reduced-fat or fat-free salad dressing, reduced-fat or fat-free cream cheese, reduced-fat or fat-free sour cream.        P designates food sources of protein.  Include a protein at each meal.     If a food does not contain protein, you may want to consider adding protein powder to the food to give it extra protein. For example, mix protein powder in with the following: oatmeal, mashed potatoes, sugar-free pudding, sugar-free gelatin (see recipes in book), no-sugar-added applesauce. Soft Mushy Diet: Phase 1  Time Meal or Snack Soft/Mushy Food Amount (ounces) Protein  (g) Supplement   6:30 am Sip on Fluids Sip on non-carbonated, calorie-free, no sugar added liquids. 8 oz   0 g Take Multivitamin containing 18 mg ferrous sulfate (iron)    7:00 am   Stop drinking fluids 30 minutes before breakfast   7:30 am Breakfast ½ cup sugar-free oatmeal with 1 scoop of protein powder. Add cinnamon, nutmeg, artificial sweeteners as desired for flavor. 4 oz    20-25 g    9:00 Snack  (optional) High Protein Gelatin (see recipe in book) 4oz 10 g    11:30 am Stop drinking liquids 30 minutes before lunch   12:00 pm Lunch Sip low-fat cream of potato soup or low-fat cream of chicken soup mixed with 1 scoop of protein powder 8 oz soup 25 g Take 400 mg calcium citrate   2:00 Snack  (optional) ½ cup high protein pudding (see recipe in book)   or   ½ cup low-fat cottage cheese or yogurt. Can also add protein powder as needed. 4 oz 14 g    or    5 g      3:00 - 5:30 pm   Sip on Fluids   Sip on non-carbonated, calorie-free, no sugar added liquids. 24 - 32 oz   0 g   Take 400 mg calcium citrate. 6:00 pm Dinner ¼ cup low-fat, well cooked beans (ex. black beans, low-fat refried beans)  ¼ cup no-sugar-added applesauce 4 oz 3.5 g Take 400 mg of calcium citrate.    7:00 - 10:00 pm Sip on Fluids Sip on non-carbonated, no sugar added liquids as needed  16-24 oz 0 g Take Multivitamin with 18 mg ferrous sulfate   Total:  80 oz clear fluids 63-77  grams 2 Multivitamins with 18 mg ferrous sulfate, 1896-1202 mg calcium citrate

## 2017-06-08 NOTE — PROGRESS NOTES
1. Have you been to the ER, urgent care clinic since your last visit? Hospitalized since your last visit? No    2. Have you seen or consulted any other health care providers outside of the [address] [place] [address] since your last visit? Include any pap smears or colon screening.  No

## 2017-06-08 NOTE — PROGRESS NOTES
2 weeks status post Sleeve gastrectomy   Pt reports doing well on liquids . No hunger. Pt's post op pain is none. Pt reports no nausea and no vomiting  Sheis drinking approximately 64 oz of water daily.   + Bm, constipation, taking Senna      She is taking all bariatric vitamins without issue. Total weight loss since surgery 22lbs  Weight loss since last visit 4lbs    Visit Vitals    /84 (BP 1 Location: Left arm, BP Patient Position: Sitting)    Pulse 87    Temp 98.9 °F (37.2 °C) (Oral)    Resp 16    Ht 5' 9\" (1.753 m)    Wt 274 lb 8 oz (124.5 kg)    LMP  (Within Weeks)    SpO2 98%    BMI 40.54 kg/m2        Patient has an advanced directive: NOt on file      Ms. Silvio Wang has a reminder for a \"due or due soon\" health maintenance. I have asked that she contact her primary care provider for follow-up on this health maintenance. Physical Examination: General appearance - alert, well appearing, and in no distress,  Chest - clear to auscultation bilaterally  Heart - normal rate, regular rhythm, normal S1, S2, no murmurs, rubs, clicks or gallops  Abdomen - soft, nontender, nondistended  scars from previous incisions healing without erythema or induration    A/P    Doing well 2 weeks  Status post laparoscopic Sleeve gastrectomy  Diet advanced to soft foods. Continue walking dogs  Encouraged water intake  Continue PPI  No lifting greater than 20 lbs  Follow up 2 weeks. Pt verbalized understanding and questions were answered to the best of my knowledge and ability.         Reji Rutledge NP

## 2017-06-08 NOTE — MR AVS SNAPSHOT
Visit Information Date & Time Provider Department Dept. Phone Encounter #  
 6/8/2017 10:40 AM KARSTEN Randall [phone] [id number] Your Appointments 6/22/2017 10:40 AM  
POST OP 10 MIN with KARSTEN Randall [phone] ([address]) Appt Note: Ep 2nd P/O 4 weeks 5-25-17 BC: Lap SLEEVE Gastrectomy; $0CP $0PB  
 [phone] [facility] [id number]  
[place] [phone]  
  
    
 7/6/2017 10:20 AM  
POST OP 10 MIN with Tom Galeas NP  
[facility] [phone] ([address]) Appt Note: Ep 3rd P/O 6 weeks 5-25-17 BC: Lap SLEEVE Gastrectomy; $0CP $0PB  
 [address] [id number] [phone] Upcoming Health Maintenance Date Due Pneumococcal 19-64 Medium Risk (1 of 1 - PPSV23) 6/13/1999 DTaP/Tdap/Td series (1 - Tdap) 6/13/2001 PAP AKA CERVICAL CYTOLOGY 6/13/2001 INFLUENZA AGE 9 TO ADULT 8/1/2017 Allergies as of 6/8/2017  Review Complete On: 6/8/2017 By: Dena Rater, LPN Severity Noted Reaction Type Reactions Other Food  03/02/2017    Other (comments) Walnuts and pineapples cause mouth irritation. Current Immunizations  Never Reviewed No immunizations on file. Not reviewed this visit Vitals BP Pulse Temp Resp Height(growth percentile) Weight(growth percentile) 120/84 (BP 1 Location: Left arm, BP Patient Position: Sitting) 87 98.9 °F (37.2 °C) (Oral) 16 5' 9\" (1.753 m) 274 lb 8 oz (124.5 kg) LMP SpO2 BMI OB Status Smoking Status (Within Weeks) 98% 40.54 kg/m2 Having regular periods Never Smoker Vitals History BMI and BSA Data Body Mass Index Body Surface Area 40.54 kg/m 2 2.46 m 2 Preferred Pharmacy Pharmacy Name Phone  [facility] [address] [facility] [phone] Your Updated Medication List  
  
   
This list is accurate as of: 6/8/17 10:59 AM.  Always use your most recent med list.  
  
  
  
  
 busPIRone 10 mg tablet Commonly known as:  BUSPAR Take 10 mg by mouth three (3) times daily. dextroamphetamine-amphetamine 20 mg tablet Commonly known as:  ADDERALL Take 20 mg by mouth daily. ergocalciferol 50,000 unit capsule Commonly known as:  ERGOCALCIFEROL Take 1 Cap by mouth every Monday and Friday. HYDROmorphone 2 mg tablet Commonly known as:  DILAUDID Take 1-2 Tabs by mouth every six (6) hours as needed for Pain. Max Daily Amount: 12 mg.  
  
 hyoscyamine SL 0.125 mg SL tablet Commonly known as:  LEVSIN/SL  
1 Tab by SubLINGual route every four (4) hours as needed for Cramping. LORazepam 0.5 mg tablet Commonly known as:  ATIVAN Take 1 Tab by mouth every eight (8) hours as needed for Anxiety. Max Daily Amount: 1.5 mg.  
  
 multivitamin tablet Commonly known as:  ONE A DAY Take 1 Tab by mouth daily. Omeprazole delayed release 20 mg tablet Commonly known as:  PRILOSEC D/R Take 1 Tab by mouth daily. ondansetron 4 mg disintegrating tablet Commonly known as:  ZOFRAN ODT Take 1 Tab by mouth every eight (8) hours as needed for Nausea. Senna 8.6 mg Cap Generic drug:  sennosides Take  by mouth. Patient Instructions Constipation: Care Instructions Your Care Instructions Constipation means that you have a hard time passing stools (bowel movements). People pass stools from 3 times a day to once every 3 days. What is normal for you may be different. Constipation may occur with pain in the rectum and cramping. The pain may get worse when you try to pass stools. Sometimes there are small amounts of bright red blood on toilet paper or the surface of stools. This is because of enlarged veins near the rectum (hemorrhoids). A few changes in your diet and lifestyle may help you avoid ongoing constipation. Your doctor may also prescribe medicine to help loosen your stool. Some medicines can cause constipation. These include pain medicines and antidepressants. Tell your doctor about all the medicines you take. Your doctor may want to make a medicine change to ease your symptoms. Follow-up care is a key part of your treatment and safety. Be sure to make and go to all appointments, and call your doctor if you are having problems. It's also a good idea to know your test results and keep a list of the medicines you take. How can you care for yourself at home? · Drink plenty of fluids, enough so that your urine is light yellow or clear like water. If you have kidney, heart, or liver disease and have to limit fluids, talk with your doctor before you increase the amount of fluids you drink. · Include high-fiber foods in your diet each day. These include fruits, vegetables, beans, and whole grains. · Get at least 30 minutes of exercise on most days of the week. Walking is a good choice. You also may want to do other activities, such as running, swimming, cycling, or playing tennis or team sports. · Take a fiber supplement, such as Citrucel or Metamucil, every day. Read and follow all instructions on the label. · Schedule time each day for a bowel movement. A daily routine may help. Take your time having your bowel movement. · Support your feet with a small step stool when you sit on the toilet. This helps flex your hips and places your pelvis in a squatting position. · Your doctor may recommend an over-the-counter laxative to relieve your constipation. Examples are Milk of Magnesia and MiraLax. Read and follow all instructions on the label. Do not use laxatives on a long-term basis. When should you call for help? Call your doctor now or seek immediate medical care if: 
· You have new or worse belly pain. · You have new or worse nausea or vomiting. · You have blood in your stools. Watch closely for changes in your health, and be sure to contact your doctor if: 
· Your constipation is getting worse. · You do not get better as expected. Where can you learn more? Go to http://jessee-gonzalo.info/. Enter 21  in the search box to learn more about \"Constipation: Care Instructions. \" Current as of: May 27, 2016 Content Version: 11.2 © 8767-6472 [employer]. Care instructions adapted under license by [employer] (which disclaims liability or warranty for this information). If you have questions about a medical condition or this instruction, always ask your healthcare professional. [address] any warranty or liability for your use of this information. What you need to know: 1. Advance your diet to soft foods. Follow the handout that you were given today in the office. 2.  Take the recommended vitamins daily 3. No lifting greater than 20 lbs. 4.  You can do light jogging and walking. 5  Follow up in 2 weeks. 6.  You may go into a pool. 7.  If you are not able to tolerate liquids or soft foods. Please call our office. [phone] 
8. If you have vomiting and persistent epigastric pain or chest pain. You should call our office, the doctor on-call or go to the emergency room. What to do if you are constipated: You may  take Milk of Magnesia. Take 2 Tablespoons followed by 16 oz of water then 2 hours later take another 2 tablespoons. If  milk of magnesia does not work then take [place] or Miralax over the counter. Keep in mind that the Benefiber or Miralax may take a day or two to work. If all of the above do not work try a Fleets enema and follow the directions on the box. Soft and Mushy: Phase 1 Below is a list of basic items to purchase for the first phase of the  
 soft mushy diet. Your surgeon or nurse practitioner will inform you when it is okay  
to advance to the next phase. Soft and Mushy Foods: Prepare food to the appropriate texture. ? Everything on clear and full liquid diet ? Applesauce (no sugar added) ? Hot & cold cereals (Cream of Wheat, Plain Cheerios®, Special K with protein®, plain oatmeal, grits) ? Frozen or canned vegetables (carrots, acorn squash, butternut squash, string beans, spinach, broccoli, cauliflower  florets only!) ? Canned fruit (in natural juice or with Splenda®) ? Fat-free, cholesterol-free egg substitute (P) ? Low-fat or fat-free cottage cheese (P) ? Low-fat or fat-free yogurt (P) ? Low-fat or fat-free [place] yogurt (P) ? Fat-free milk or 1% milk (P) ? Lactaid fat-free or 1% low fat milk (P) ? Low-fat well-cooked/soft beans (the consistency of refried beans) (P) ? No sugar added, low fat pudding (no pistachio or other flavor containing nuts) ? low-fat cream soups ? Low-fat chicken noodle or chicken rice soup (P) ? Sugar-free fudgesicles ? Sugar-free cocoa ? Fat free whipped or mashed potatoes ? Herbs and spices ? Lite butter, margarine, canola oil, olive oil, reduced-fat or fat-free mayonnaise, reduced-fat or fat-free salad dressing, reduced-fat or fat-free cream cheese, reduced-fat or fat-free sour cream.  
 
 
 P designates food sources of protein. Include a protein at each meal.  
 
If a food does not contain protein, you may want to consider adding protein powder to the food to give it extra protein. For example, mix protein powder in with the following: oatmeal, mashed potatoes, sugar-free pudding, sugar-free gelatin (see recipes in book), no-sugar-added applesauce. Soft Mushy Diet: Phase 1 Time Meal or Snack Soft/Mushy Food Amount (ounces) Protein 
(g) Supplement 6:30 am Sip on Fluids Sip on non-carbonated, calorie-free, no sugar added liquids. 8 oz 0 g Take Multivitamin containing 18 mg ferrous sulfate (iron) 7:00 am   Stop drinking fluids 30 minutes before breakfast  
7:30 am Breakfast ½ cup sugar-free oatmeal with 1 scoop of protein powder. Add cinnamon, nutmeg, artificial sweeteners as desired for flavor. 4 oz  
 20-25 g   
9:00 Snack (optional) High Protein Gelatin (see recipe in book) 4oz 10 g   
11:30 am Stop drinking liquids 30 minutes before lunch 12:00 pm Lunch Sip low-fat cream of potato soup or low-fat cream of chicken soup mixed with 1 scoop of protein powder 8 oz soup 25 g Take 400 mg calcium citrate 2:00 Snack (optional) ½ cup high protein pudding (see recipe in book) or ½ cup low-fat cottage cheese or yogurt. Can also add protein powder as needed. 4 oz 14 g 
 
or 
 
5 g   
 
3:00  5:30 pm  
Sip on Fluids Sip on non-carbonated, calorie-free, no sugar added liquids. 24 - 32 oz  
0 g Take 400 mg calcium citrate. 6:00 pm Dinner ¼ cup low-fat, well cooked beans (ex. black beans, low-fat refried beans) ¼ cup no-sugar-added applesauce 4 oz 3.5 g Take 400 mg of calcium citrate. 7:00 - 10:00 pm Sip on Fluids Sip on non-carbonated, no sugar added liquids as needed  16-24 oz 0 g Take Multivitamin with 18 mg ferrous sulfate Total:  80 oz clear fluids 63-77 
grams 2 Multivitamins with 18 mg ferrous sulfate, 9068-9887 mg calcium citrate Introducing [facility]! Analisa Camejo introduces [employer] patient portal. Now you can access parts of your medical record, email your doctor's office, and request medication refills online. 1. In your internet browser, go to [email or web address] 2. Click on the First Time User? Click Here link in the Sign In box. You will see the New Member Sign Up page. 3. Enter your [employer] Access Code exactly as it appears below. You will not need to use this code after youve completed the sign-up process.  If you do not sign up before the expiration date, you must request a new code. · [employer] Access Code: [email or web address] Expires: 8/7/2017  9:01 AM 
 
4. Enter the last four digits of your Social Security Number (xxxx) and Date of Birth (mm/dd/yyyy) as indicated and click Submit. You will be taken to the next sign-up page. 5. Create a [employer] ID. This will be your [employer] login ID and cannot be changed, so think of one that is secure and easy to remember. 6. Create a [employer] password. You can change your password at any time. 7. Enter your Password Reset Question and Answer. This can be used at a later time if you forget your password. 8. Enter your e-mail address. You will receive e-mail notification when new information is available in [address] 9. Click Sign Up. You can now view and download portions of your medical record. 10. Click the Download Summary menu link to download a portable copy of your medical information. If you have questions, please visit the Frequently Asked Questions section of the [employer] website. Remember, [employer] is NOT to be used for urgent needs. For medical emergencies, dial 911. Now available from your iPhone and Android! Please provide this summary of care documentation to your next provider. Your primary care clinician is listed as Joshua Lebron. If you have any questions after today's visit, please call [phone].

## 2017-06-15 ENCOUNTER — TELEPHONE (OUTPATIENT)
Dept: SURGERY | Age: 37
End: 2017-06-15

## 2017-06-15 NOTE — TELEPHONE ENCOUNTER
Bariatric Post-Operative Phone Calls: Week 3    Diet:Question of any nausea and/or vomiting. Question of tolerance to diet advancement from liquids to solids. Protein intake (goal is 60 grams of protein daily)   Poor____Fair____Good____Great__x__     Comment:______________________________________________________________      ______________________________________________________________________    Hydration:Less than 32 ounces of water daily is fair to poor (Goal is 64 ounces per day)   Poor____ Fair____ Good__x__Great____    Comment:______________________________________________________________    ______________________________________________________________________      Ambulation:( walking at least 3 x week, for at least 30 minutes)   Poor______ Fair______ Good__x____     Great______ Comment:__________________________________________________    ______________________________________________________________________      Urine Color: Question of any odor and color(should be sandra, pale, and clear) Dark______ Amber___x___ Pale___x___      Clear______ Comment:___________________________________________________                           ________________________________________________________________    Bowel movements: Question of any constipation- haven't had any bowel movements for more than 3 days. This could be related to protein intake and/or narcotic pain medication usage. Comment:                                                                              Moving her bowels using colace daily                                                Pain: Left sided abdominal pain is normal (should be less than 3)         Question if pain medication is helpful.  10___ 9___ 8___ 7___ 6___ 5___ 4___ 3___     2___1___0_x__Comment:_________________________________________________    ______________________________________________________________________      Incision: (No redness, pain, swelling or fever) Healing Well___x___     Healed______Redness_________ Pain_________     Swelling_________ Fever__________(greater than 101 needs evaluation)    Comment:____________________________________________________________    ______________________________________________________________________  Use of incentive spirometer: Yes____       No    x       Next Appointment:__6/22/17____________                 Support Group: Yes______No__x____    Additional Comments:____________________________________________________________    ____________________________________________________________________      If more than one parameter is not met or considered poor, nurse needs to discuss with provider recommend for patient to be seen in the office as soon as possible or refer to the provider for follow-up. Reinforce to patient to use bariatric educational booklet as guide. It is appropriate to refer patient to the nutritionist to discuss more in detail of diet and nutrition.

## 2017-06-15 NOTE — TELEPHONE ENCOUNTER
----- Message from Ene Harrison LPN sent at 7/86/6673  4:09 PM EDT -----  Regarding: 3 week post op call FW: gastric sleeve DC home Fri 5/26      ----- Message -----     From: OBED Michaels     Sent: 5/26/2017   3:34 PM       To: Tu Ferrer LPN  Subject: gastric sleeve DC home Fri 5/26                  Bariatric Discharge Notice for Follow Up Call    Admit Date: 5/25    Pt is POD #1 s/p laparoscopic sleeve gastrectomy and is being discharged to home today Fri 5/26 after a routine post op course.       Thanks,  Sam Lawler PA-C

## 2017-06-22 ENCOUNTER — OFFICE VISIT (OUTPATIENT)
Dept: SURGERY | Age: 37
End: 2017-06-22

## 2017-06-22 VITALS
BODY MASS INDEX: 39.84 KG/M2 | RESPIRATION RATE: 18 BRPM | SYSTOLIC BLOOD PRESSURE: 122 MMHG | OXYGEN SATURATION: 98 % | WEIGHT: 269 LBS | TEMPERATURE: 98.1 F | HEART RATE: 112 BPM | DIASTOLIC BLOOD PRESSURE: 100 MMHG | HEIGHT: 69 IN

## 2017-06-22 DIAGNOSIS — Z09 SURGERY FOLLOW-UP: ICD-10-CM

## 2017-06-22 DIAGNOSIS — E66.09 NON MORBID OBESITY DUE TO EXCESS CALORIES: Primary | ICD-10-CM

## 2017-06-22 NOTE — MR AVS SNAPSHOT
Visit Information Date & Time Provider Department Dept. Phone Encounter #  
 6/22/2017 10:40 AM KARSTEN Rutherford [phone] [id number] Your Appointments 7/6/2017 10:20 AM  
POST OP 10 MIN with KARSTEN Rutherford 137 [id number] ([address]) Appt Note: Ep 3rd P/O 6 weeks 5-25-17 BC: Lap SLEEVE Gastrectomy; $0CP $0PB  
 [address] [facility] [address] [facility] [id number]  
[address] [facility] Upcoming Health Maintenance Date Due Pneumococcal 19-64 Medium Risk (1 of 1 - PPSV23) 6/13/1999 DTaP/Tdap/Td series (1 - Tdap) 6/13/2001 PAP AKA CERVICAL CYTOLOGY 6/13/2001 INFLUENZA AGE 9 TO ADULT 8/1/2017 Allergies as of 6/22/2017  Review Complete On: 6/22/2017 By: Toni Brandon NP Severity Noted Reaction Type Reactions Other Food  03/02/2017    Other (comments) Walnuts and pineapples cause mouth irritation. Current Immunizations  Never Reviewed No immunizations on file. Not reviewed this visit Vitals BP Pulse Temp Resp Height(growth percentile) Weight(growth percentile) (!) 122/100 (BP 1 Location: Left arm, BP Patient Position: Sitting) (!) 112 98.1 °F (36.7 °C) (Oral) 18 5' 9\" (1.753 m) 269 lb (122 kg) SpO2 BMI OB Status Smoking Status 98% 39.72 kg/m2 Having regular periods Never Smoker BMI and BSA Data Body Mass Index Body Surface Area  
 39.72 kg/m 2 2.44 m 2 Preferred Pharmacy Pharmacy Name Phone [facility] [address] [facility], [facility] [phone] Your Updated Medication List  
  
   
This list is accurate as of: 6/22/17 11:33 AM.  Always use your most recent med list.  
  
  
  
  
 busPIRone 10 mg tablet Commonly known as:  BUSPAR Take 10 mg by mouth three (3) times daily. dextroamphetamine-amphetamine 20 mg tablet Commonly known as:  ADDERALL Take 20 mg by mouth daily. ergocalciferol 50,000 unit capsule Commonly known as:  ERGOCALCIFEROL Take 1 Cap by mouth every Monday and Friday. HYDROmorphone 2 mg tablet Commonly known as:  DILAUDID Take 1-2 Tabs by mouth every six (6) hours as needed for Pain. Max Daily Amount: 12 mg.  
  
 hyoscyamine SL 0.125 mg SL tablet Commonly known as:  LEVSIN/SL  
1 Tab by SubLINGual route every four (4) hours as needed for Cramping. LORazepam 0.5 mg tablet Commonly known as:  ATIVAN Take 1 Tab by mouth every eight (8) hours as needed for Anxiety. Max Daily Amount: 1.5 mg.  
  
 multivitamin tablet Commonly known as:  ONE A DAY Take 1 Tab by mouth daily. Omeprazole delayed release 20 mg tablet Commonly known as:  PRILOSEC D/R Take 1 Tab by mouth daily. ondansetron 4 mg disintegrating tablet Commonly known as:  ZOFRAN ODT Take 1 Tab by mouth every eight (8) hours as needed for Nausea. Senna 8.6 mg Cap Generic drug:  sennosides Take  by mouth. Patient Instructions Constipation: Care Instructions Your Care Instructions Constipation means that you have a hard time passing stools (bowel movements). People pass stools from 3 times a day to once every 3 days. What is normal for you may be different. Constipation may occur with pain in the rectum and cramping. The pain may get worse when you try to pass stools. Sometimes there are small amounts of bright red blood on toilet paper or the surface of stools. This is because of enlarged veins near the rectum (hemorrhoids). A few changes in your diet and lifestyle may help you avoid ongoing constipation. Your doctor may also prescribe medicine to help loosen your stool. Some medicines can cause constipation. These include pain medicines and antidepressants. Tell your doctor about all the medicines you take.  Your doctor may want to make a medicine change to ease your symptoms. Follow-up care is a key part of your treatment and safety. Be sure to make and go to all appointments, and call your doctor if you are having problems. It's also a good idea to know your test results and keep a list of the medicines you take. How can you care for yourself at home? · Drink plenty of fluids, enough so that your urine is light yellow or clear like water. If you have kidney, heart, or liver disease and have to limit fluids, talk with your doctor before you increase the amount of fluids you drink. · Include high-fiber foods in your diet each day. These include fruits, vegetables, beans, and whole grains. · Get at least 30 minutes of exercise on most days of the week. Walking is a good choice. You also may want to do other activities, such as running, swimming, cycling, or playing tennis or team sports. · Take a fiber supplement, such as Citrucel or Metamucil, every day. Read and follow all instructions on the label. · Schedule time each day for a bowel movement. A daily routine may help. Take your time having your bowel movement. · Support your feet with a small step stool when you sit on the toilet. This helps flex your hips and places your pelvis in a squatting position. · Your doctor may recommend an over-the-counter laxative to relieve your constipation. Examples are Milk of Magnesia and MiraLax. Read and follow all instructions on the label. Do not use laxatives on a long-term basis. When should you call for help? Call your doctor now or seek immediate medical care if: 
· You have new or worse belly pain. · You have new or worse nausea or vomiting. · You have blood in your stools. Watch closely for changes in your health, and be sure to contact your doctor if: 
· Your constipation is getting worse. · You do not get better as expected. Where can you learn more? Go to http://jessee-gonzalo.info/. Enter 21  in the search box to learn more about \"Constipation: Care Instructions. \" Current as of: March 20, 2017 Content Version: 11.3 © 8601-9170 [email or web address]. Care instructions adapted under license by [employer] (which disclaims liability or warranty for this information). If you have questions about a medical condition or this instruction, always ask your healthcare professional. [place] [address] any warranty or liability for your use of this information. What you need to know: 
1 . Advance your diet to moist meats. Follow the handout that you were given today in the office. 2. Take the recommended vitamins daily 3 No lifting greater than 40 lbs. 4. You can do light jogging, moderate walking and a recumbent bike. 5 Follow up in 2 weeks. 6. You may go into a pool. 7. If you are not able to tolerate liquids, soft foods or moist meats. Please call our office. [phone] 
8. If you have vomiting and persistent epigastric pain or chest pain. You should call our office, the doctor on-call or go to the emergency room. What to do if you are constipated: You may  take Milk of Magnesia. Take 2 Tablespoons followed by 16 oz of water then 2 hours later take another 2 tablespoons. If  milk of magnesia does not work then take Taoism-[place] or Miralax over the counter. Keep in mind that the Benefiber or Miralax may take a day or two to work. If all of the above do not work try a Fleets enema and follow the directions on the box. Shopping List [employer] Guajard[employer] Soft Mushy Diet: Phase 2 - Moist Meats Below is a list of moist meats that you can now introduce into your diet. Moist Meats: Prepare food to the appropriate texture using low-fat cooking  
methods ? Tuna packed in water (strain before eating) ? White flaky fish (bhavin, cod, flounder, tilapia, salmon) ? White chicken breast packed in water (strain before eating) ? 96-99% fat free thinly sliced deli meat (ham, turkey, roast beef) ? Fat free non-stick spray ? Silken Tofu ? Low-fat or vegetarian refried beans ? Well-cooked beans and lentils ? Skinless turkey or chicken (prepare to a soft texture) ? 93% lean pureed beef (round or sirloin only) ? Lean pork (cooked until very tender, cut into small pieces) ? Eggs (preferable egg whites) ? Egg substitutes ? Herbs and spices ? Lite butter, margarine, canola oil, olive oil, reduced-fat or fat-free rankin, reduced-fat or fat-free salad dressing, reduced-fat or fat-free cream cheese, reduced-fat or fat-free sour cream, lemon juice, salt, pepper, mustard, ketchup, salsa. See patient handbook for more low-fat cooking ideas. ? Be sure to use moist methods of cooking. Avoid microwaving meats because it can dry out the food making it harder to tolerate. Soft Mushy Diet: Phase 2 Time Meal or Snack Soft/Mushy Food Amount (ounces) Protein 
(g) Supplement 6:30 am Sip on Fluids Sip on non-carbonated, calorie-free, no sugar added liquids. 8 oz 
 0 g Take Multivitamin containing 18 mg ferrous sulfate (iron) 7:00 am   Stop drinking fluids 30 minutes before breakfast  
7:30 am Breakfast ¼ cup soft scrambled egg or egg substitute ¼ cup canned fruit (packed in natural juice, strained)  4 oz  
 7 g   
9:00 Snack (optional) ½ cup low-fat or fat-free yogurt  
or ½ cup cottage cheese  4oz 4g 
or 14 g   
11:30 am Stop drinking liquids 30 minutes before lunch 12:00 pm Lunch ¼ cup low-fat or lean deli meat 
with ¼ well cooked green beans 4 oz  14 g Take 400 mg calcium citrate 2:00 Snack (optional) ½ cup high protein, sugar-free pudding with 1 scoop added protein powder (see recipe in book). 4 oz 14 g   
 
3:00  5:30 pm  
Sip on Fluids Sip on non-carbonated, calorie-free, no sugar added liquids. 24 - 32 oz  
0 g Take 400 mg calcium citrate. 6:00 pm Dinner ¼ cup soft/flaky fish (tilapia, flounder, tuna) ¼ cup mashed potatoes or pureed cauliflower mashed potatoes (consider adding protein powder)  4 oz 14 g Take 400 mg of calcium citrate. 7:00 - 10:00 pm Sip on Fluids Sip on non-carbonated, no sugar added liquids as needed  16-24 oz 0 g Take Multivitamin with 18 mg ferrous sulfate Total:  64 oz fluids 63 
grams 2 Multivitamins with 18 mg ferrous sulfate, 1612-3164 mg calcium citrate Introducing [facility]! Peg Hemp introduces Life[employer] patient portal. Now you can access parts of your medical record, email your doctor's office, and request medication refills online. 1. In your internet browser, go to [email or web address]"[employer]. Dotflux/"[employer] 2. Click on the First Time User? Click Here link in the Sign In box. You will see the New Member Sign Up page. 3. Enter your Lifeenergy Access Code exactly as it appears below. You will not need to use this code after youve completed the sign-up process. If you do not sign up before the expiration date, you must request a new code. · Life[employer] Access Code: [email or web address] Expires: 8/7/2017  9:01 AM 
 
4. Enter the last four digits of your Social Security Number (xxxx) and Date of Birth (mm/dd/yyyy) as indicated and click Submit. You will be taken to the next sign-up page. 5. Create a Lifeenergy ID. This will be your Lifeenergy login ID and cannot be changed, so think of one that is secure and easy to remember. 6. Create a Lifeenergy password. You can change your password at any time. 7. Enter your Password Reset Question and Answer. This can be used at a later time if you forget your password. 8. Enter your e-mail address. You will receive e-mail notification when new information is available in [address] 9. Click Sign Up. You can now view and download portions of your medical record. 10. Click the Download Summary menu link to download a portable copy of your medical information. If you have questions, please visit the Frequently Asked Questions section of the [employer] website. Remember, [employer] is NOT to be used for urgent needs. For medical emergencies, dial 911. Now available from your iPhone and Android! Please provide this summary of care documentation to your next provider. Your primary care clinician is listed as Joshua Lebron. If you have any questions after today's visit, please call [phone].

## 2017-06-22 NOTE — PROGRESS NOTES
4 weeks status post Sleeve gastrectomy   Pt reports doing well on liquids and soft foods. Pt's post op pain is none. Pt reports no nausea and no vomiting   She is drinking approximately 64 oz of water daily. She feels her blood pressure may be up due to a bill issue. She is taking all bariatric vitamins without issue. Total weight loss since surgery 18lbs  Weight loss since last visit 5.5lbs    Visit Vitals    BP (!) 122/100 (BP 1 Location: Left arm, BP Patient Position: Sitting)    Pulse (!) 112    Temp 98.1 °F (36.7 °C) (Oral)    Resp 18    Ht 5' 9\" (1.753 m)    Wt 269 lb (122 kg)    SpO2 98%    BMI 39.72 kg/m2        Patient has an advanced directive: NO      Ms. Raudel Mota has a reminder for a \"due or due soon\" health maintenance. I have asked that she contact her primary care provider for follow-up on this health maintenance. Physical Examination: General appearance - alert, well appearing, and in no distress,  Chest - clear to auscultation bilaterally  Heart - normal rate, regular rhythm, normal S1, S2, no murmurs, rubs, clicks or gallops  Abdomen - soft, nontender, nondistended  scars from previous incisions healing without erythema or induration    A/P    Doing well 4 weeks  Status post laparoscopic Sleeve gastrectomy  Diet advanced to soft meats. She wants to meet with Holly Hendricks because she does not eat meat. Continue supplementing with protein shake. Encouraged water intake  Continue PPI  No lifting greater than 40 lbs  Follow up 2 weeks. Pt verbalized understanding and questions were answered to the best of my knowledge and ability. diet educational materials were provided.       Kaiden Rdz NP

## 2017-06-22 NOTE — PATIENT INSTRUCTIONS
Constipation: Care Instructions  Your Care Instructions  Constipation means that you have a hard time passing stools (bowel movements). People pass stools from 3 times a day to once every 3 days. What is normal for you may be different. Constipation may occur with pain in the rectum and cramping. The pain may get worse when you try to pass stools. Sometimes there are small amounts of bright red blood on toilet paper or the surface of stools. This is because of enlarged veins near the rectum (hemorrhoids). A few changes in your diet and lifestyle may help you avoid ongoing constipation. Your doctor may also prescribe medicine to help loosen your stool. Some medicines can cause constipation. These include pain medicines and antidepressants. Tell your doctor about all the medicines you take. Your doctor may want to make a medicine change to ease your symptoms. Follow-up care is a key part of your treatment and safety. Be sure to make and go to all appointments, and call your doctor if you are having problems. It's also a good idea to know your test results and keep a list of the medicines you take. How can you care for yourself at home? · Drink plenty of fluids, enough so that your urine is light yellow or clear like water. If you have kidney, heart, or liver disease and have to limit fluids, talk with your doctor before you increase the amount of fluids you drink. · Include high-fiber foods in your diet each day. These include fruits, vegetables, beans, and whole grains. · Get at least 30 minutes of exercise on most days of the week. Walking is a good choice. You also may want to do other activities, such as running, swimming, cycling, or playing tennis or team sports. · Take a fiber supplement, such as Citrucel or Metamucil, every day. Read and follow all instructions on the label. · Schedule time each day for a bowel movement. A daily routine may help.  Take your time having your bowel movement. · Support your feet with a small step stool when you sit on the toilet. This helps flex your hips and places your pelvis in a squatting position. · Your doctor may recommend an over-the-counter laxative to relieve your constipation. Examples are Milk of Magnesia and MiraLax. Read and follow all instructions on the label. Do not use laxatives on a long-term basis. When should you call for help? Call your doctor now or seek immediate medical care if:  · You have new or worse belly pain. · You have new or worse nausea or vomiting. · You have blood in your stools. Watch closely for changes in your health, and be sure to contact your doctor if:  · Your constipation is getting worse. · You do not get better as expected. Where can you learn more? Go to http://jessee-gonzalo.info/. Enter 21 [phone] in the search box to learn more about \"Constipation: Care Instructions. \"  Current as of: March 20, 2017  Content Version: 11.3  © 1499-5412 [employer]. Care instructions adapted under license by [employer] (which disclaims liability or warranty for this information). If you have questions about a medical condition or this instruction, always ask your healthcare professional. [address] any warranty or liability for your use of this information. What you need to know:  1 . Advance your diet to moist meats. Follow the handout that you were given today in the office. 2. Take the recommended vitamins daily  3 No lifting greater than 40 lbs. 4. You can do light jogging, moderate walking and a recumbent bike. 5 Follow up in 2 weeks. 6. You may go into a pool. 7. If you are not able to tolerate liquids, soft foods or moist meats. Please call our office. [phone]  8. If you have vomiting and persistent epigastric pain or chest pain. You should call our office, the doctor on-call or go to the emergency room.          What to do if you are constipated: You may  take Milk of Magnesia. Take 2 Tablespoons followed by 16 oz of water then 2 hours later take another 2 tablespoons. If  milk of magnesia does not work then take Taoist-[place] or Miralax over the counter. Keep in mind that the Benefiber or Miralax may take a day or two to work. If all of the above do not work try a Fleets enema and follow the directions on the box. Shopping List [employer]     Soft Mushy Diet: Phase 2 - Moist Meats     Below is a list of moist meats that you can now introduce into your diet. Moist Meats: Prepare food to the appropriate texture using low-fat cooking   methods       ? Tuna packed in water (strain before eating)  ? White flaky fish (bhavin, cod, flounder, tilapia, salmon)   ? White chicken breast packed in water (strain before eating)  ? 96-99% fat free thinly sliced deli meat (ham, turkey, roast beef)  ? Fat free non-stick spray  ? Silken Tofu  ? Low-fat or vegetarian refried beans  ? Well-cooked beans and lentils  ? Skinless turkey or chicken (prepare to a soft texture)  ? 93% lean pureed beef (round or sirloin only)  ? Lean pork (cooked until very tender, cut into small pieces)  ? Eggs (preferable egg whites)  ? Egg substitutes  ? Herbs and spices  ? Lite butter, margarine, canola oil, olive oil, reduced-fat or fat-free rankin, reduced-fat or fat-free salad dressing, reduced-fat or fat-free cream cheese, reduced-fat or fat-free sour cream, lemon juice, salt, pepper, mustard, ketchup, salsa. See patient handbook for more low-fat cooking ideas. ? Be sure to use moist methods of cooking. Avoid microwaving meats because it can dry out the food making it harder to tolerate. Soft Mushy Diet: Phase 2  Time Meal or Snack Soft/Mushy Food Amount (ounces) Protein  (g) Supplement   6:30 am Sip on Fluids Sip on non-carbonated, calorie-free, no sugar added liquids.  8 oz   0 g Take Multivitamin containing 18 mg ferrous sulfate (iron)    7:00 am   Stop drinking fluids 30 minutes before breakfast   7:30 am Breakfast ¼ cup soft scrambled egg or egg substitute   ¼ cup canned fruit (packed in natural juice, strained)  4 oz    7 g    9:00 Snack  (optional) ½ cup low-fat or fat-free yogurt   or   ½ cup cottage cheese  4oz 4g  or  14 g    11:30 am Stop drinking liquids 30 minutes before lunch   12:00 pm Lunch ¼ cup low-fat or lean deli meat  with  ¼ well cooked green beans 4 oz  14 g Take 400 mg calcium citrate   2:00 Snack  (optional) ½ cup high protein, sugar-free pudding with 1 scoop added protein powder (see recipe in book). 4 oz 14 g      3:00 - 5:30 pm   Sip on Fluids   Sip on non-carbonated, calorie-free, no sugar added liquids. 24 - 32 oz   0 g   Take 400 mg calcium citrate. 6:00 pm Dinner ¼ cup soft/flaky fish (tilapia, flounder, tuna)  ¼ cup mashed potatoes or pureed cauliflower mashed potatoes (consider adding protein powder)  4 oz 14 g Take 400 mg of calcium citrate.    7:00 - 10:00 pm Sip on Fluids Sip on non-carbonated, no sugar added liquids as needed  16-24 oz 0 g Take Multivitamin with 18 mg ferrous sulfate   Total:  64 oz fluids 63  grams 2 Multivitamins with 18 mg ferrous sulfate, 1022-7033 mg calcium citrate

## 2017-06-22 NOTE — PROGRESS NOTES
1. Have you been to the ER, urgent care clinic since your last visit? Hospitalized since your last visit? No    2. Have you seen or consulted any other health care providers outside of the [address] [place] [address] since your last visit? Include any pap smears or colon screening.  No

## 2017-07-06 ENCOUNTER — OFFICE VISIT (OUTPATIENT)
Dept: SURGERY | Age: 37
End: 2017-07-06

## 2017-07-06 VITALS
WEIGHT: 265 LBS | BODY MASS INDEX: 39.25 KG/M2 | HEIGHT: 69 IN | DIASTOLIC BLOOD PRESSURE: 80 MMHG | OXYGEN SATURATION: 98 % | SYSTOLIC BLOOD PRESSURE: 102 MMHG | HEART RATE: 88 BPM | TEMPERATURE: 97.3 F

## 2017-07-06 DIAGNOSIS — Z09 SURGERY FOLLOW-UP: ICD-10-CM

## 2017-07-06 DIAGNOSIS — E66.09 NON MORBID OBESITY DUE TO EXCESS CALORIES: Primary | ICD-10-CM

## 2017-07-06 NOTE — PROGRESS NOTES
6 weeks status post Sleeve gastrectomy   Pt reports doing well on liquids, soft foods and soft meat. She has been eating vegggie burgers, veggie ground beef. Drinking protein shakes, cottage cheese   Pt's post op pain is none. Pt reports no nausea and no vomiting  Sheis drinking approximately 64 oz of water daily. She is taking all bariatric vitamins without issue. Total weight loss since surgery 22lbs  Weight loss since last visit 4lbs    Visit Vitals    /80 (BP 1 Location: Left arm, BP Patient Position: Sitting)    Pulse 88    Temp 97.3 °F (36.3 °C) (Oral)    Ht 5' 9\" (1.753 m)    Wt 265 lb (120.2 kg)    SpO2 98%    BMI 39.13 kg/m2        Patient has an advanced directive: NO      Ms. Katty Hill has a reminder for a \"due or due soon\" health maintenance. I have asked that she contact her primary care provider for follow-up on this health maintenance. Physical Examination: General appearance - alert, well appearing, and in no distress,  Chest - clear to auscultation bilaterally  Heart - normal rate, regular rhythm, normal S1, S2, no murmurs, rubs, clicks or gallops  Abdomen - soft, nontender, nondistended  scars from previous incisions healing without erythema or induration    A/P    Doing well 6 weeks  Status post laparoscopic Sleeve gastrectomy  Diet advanced to solid foods. She is going to meet with Talat Romero to discuss vegetarian options. Encouraged water intake  Continue PPI  Follow up in 6 weeks. Focus on protein. .B12 injections sent to pharmacy. Pt verbalized understanding and questions were answered to the best of my knowledge and ability.         Azucena Alanis NP

## 2017-07-06 NOTE — PROGRESS NOTES
Patient is here for f/u on lap sleeve. Patient has no complaints. Visit Vitals    /80 (BP 1 Location: Left arm, BP Patient Position: Sitting)    Pulse 88    Temp 97.3 °F (36.3 °C) (Oral)    Ht 5' 9\" (1.753 m)    Wt 265 lb (120.2 kg)    SpO2 98%    BMI 39.13 kg/m2     1. Have you been to the ER, urgent care clinic since your last visit? Hospitalized since your last visit? No    2. Have you seen or consulted any other health care providers outside of the [facility] since your last visit? Include any pap smears or colon screening.  No

## 2017-07-06 NOTE — MR AVS SNAPSHOT
Visit Information Date & Time Provider Department Dept. Phone Encounter #  
 7/6/2017 10:20 AM Frank Garcia NP [facility] [address] [phone] [id number] Your Appointments 8/22/2017 10:00 AM  
POST OP 10 MIN with Frank Garcia NP  
[facility] 22 135 ([address]) Appt Note: PO; pt requested later appt time; 6wk. F/U w/Meg; [facility]; 7/6/17  
 [address] [place] [id number] [place] [address] Upcoming Health Maintenance Date Due Pneumococcal 19-64 Medium Risk (1 of 1 - PPSV23) 6/13/1999 DTaP/Tdap/Td series (1 - Tdap) 6/13/2001 PAP AKA CERVICAL CYTOLOGY 6/13/2001 INFLUENZA AGE 9 TO ADULT 8/1/2017 Allergies as of 7/6/2017  Review Complete On: 7/6/2017 By: Frank Garcia NP Severity Noted Reaction Type Reactions Other Food  03/02/2017    Other (comments) Walnuts and pineapples cause mouth irritation. Current Immunizations  Never Reviewed No immunizations on file. Not reviewed this visit Vitals BP Pulse Temp Height(growth percentile) Weight(growth percentile) SpO2  
 102/80 (BP 1 Location: Left arm, BP Patient Position: Sitting) 88 97.3 °F (36.3 °C) (Oral) 5' 9\" (1.753 m) 265 lb (120.2 kg) 98% BMI OB Status Smoking Status 39.13 kg/m2 Having regular periods Never Smoker BMI and BSA Data Body Mass Index Body Surface Area  
 39.13 kg/m 2 2.42 m 2 Preferred Pharmacy Pharmacy Name Phone [id number] [address] [facility] AT [address] [place] [address] [phone] Your Updated Medication List  
  
   
This list is accurate as of: 7/6/17  1:18 PM.  Always use your most recent med list.  
  
  
  
  
 busPIRone 10 mg tablet Commonly known as:  BUSPAR Take 10 mg by mouth three (3) times daily. cyanocobalamin (vitamin B-12) 1,000 mcg/mL Kit 1 Dose by Injection route every thirty (30) days. dextroamphetamine-amphetamine 20 mg tablet Commonly known as:  ADDERALL Take 20 mg by mouth daily. ergocalciferol 50,000 unit capsule Commonly known as:  ERGOCALCIFEROL Take 1 Cap by mouth every Monday and Friday. HYDROmorphone 2 mg tablet Commonly known as:  DILAUDID Take 1-2 Tabs by mouth every six (6) hours as needed for Pain. Max Daily Amount: 12 mg.  
  
 hyoscyamine SL 0.125 mg SL tablet Commonly known as:  LEVSIN/SL  
1 Tab by SubLINGual route every four (4) hours as needed for Cramping. LORazepam 0.5 mg tablet Commonly known as:  ATIVAN Take 1 Tab by mouth every eight (8) hours as needed for Anxiety. Max Daily Amount: 1.5 mg.  
  
 multivitamin tablet Commonly known as:  ONE A DAY Take 1 Tab by mouth daily. Omeprazole delayed release 20 mg tablet Commonly known as:  PRILOSEC D/R Take 1 Tab by mouth daily. ondansetron 4 mg disintegrating tablet Commonly known as:  ZOFRAN ODT Take 1 Tab by mouth every eight (8) hours as needed for Nausea. Senna 8.6 mg Cap Generic drug:  sennosides Take  by mouth. Syringe with Needle, Safety 3 mL 25 gauge x 1\" Syrg  
1 Units by Does Not Apply route every thirty (30) days. Prescriptions Sent to Pharmacy Refills  
 cyanocobalamin, vitamin B-12, 1,000 mcg/mL kit 0 Si Dose by Injection route every thirty (30) days. Class: Normal  
 Pharmacy: [place], [address]  Ph #: [phone] Route: Injection Syringe with Needle, Safety 3 mL 25 gauge x 1\" syrg 0 Si Units by Does Not Apply route every thirty (30) days. Class: Normal  
 Pharmacy: [place], [address]  Ph #: [phone] Route: Does Not Apply Patient Instructions Learning About Physical Activity What is physical activity? Physical activity is any kind of activity that gets your body moving. The types of physical activity that can help you get fit and stay healthy include: · Aerobic or \"cardio\" activities that make your heart beat faster and make you breathe harder, such as brisk walking, riding a bike, or running. Aerobic activities strengthen your heart and lungs and build up your endurance. · Strength training activities that make your muscles work against, or \"resist,\" something, such as lifting weights or doing push-ups. These activities help tone and strengthen your muscles. · Stretches that allow you to move your joints and muscles through their full range of motion. Stretching helps you be more flexible and avoid injury. What are the benefits of physical activity? Being active is one of the best things you can do to get fit and stay healthy. It helps you to: · Feel stronger and have more energy to do all the things you like to do. · Focus better at school or work and perform better in sports. · Feel, think, and sleep better. · Reach and stay at a healthy weight. · Lose fat and build lean muscle. · Lower your risk for serious health problems. · Keep your bones, muscles, and joints strong. Being fit lets you do more physical activity. And it lets you work out harder without as much effort. How can you make physical activity part of your life? Get at least 30 minutes of exercise on most days of the week. Walking is a good choice. You also may want to do other activities, such as running, swimming, cycling, or playing tennis or team sports. Pick activities that you likeones that make your heart beat faster, your muscles stronger, and your muscles and joints more flexible. If you find more than one thing you like doing, do them all. You don't have to do the same thing every day. Get your heart pumping every day.  Any activity that makes your heart beat faster and keeps it at that rate for a while counts. Here are some great ways to get your heart beating faster: · Go for a brisk walk, run, or bike ride. · Go for a hike or swim. · Go in-line skating. · Play a game of touch football, basketball, or soccer. · Ride a bike. · Play tennis or racquetball. · Climb stairs. Even some household chores can be aerobicjust do them at a faster pace. Vacuuming, raking or mowing the lawn, sweeping the garage, and washing and waxing the car all can help get your heart rate up. Strengthen your muscles during the week. You don't have to lift heavy weights or grow big, bulky muscles to get stronger. Doing a few simple activities that make your muscles work against, or \"resist,\" something can help you get stronger. For example, you can: · Do push-ups or sit-ups, which use your own body weight as resistance. · Lift weights or dumbbells or use stretch bands at home or in a gym or community center. Stretch your muscles often. Stretching will help you as you become more active. It can help you stay flexible, loosen tight muscles, and avoid injury. It can also help improve your balance and posture and can be a great way to relax. Be sure to stretch the muscles you'll be using when you work out. It's best to warm your muscles slightly before you stretch them. Walk or do some other light aerobic activity for a few minutes, and then start stretching. When you stretch your muscles: · Do it slowly. Stretching is not about going fast or making sudden movements. · Don't push or bounce during a stretch. · Hold each stretch for at least 15 to 30 seconds, if you can. You should feel a stretch in the muscle, but not pain. · Breathe out as you do the stretch. Then breathe in as you hold the stretch. Don't hold your breath. If you're worried about how more activity might affect your health, have a checkup before you start.  Follow any special advice your doctor gives you for getting a smart start. Where can you learn more? Go to http://jessee-gonzalo.info/. Enter T449 in the search box to learn more about \"Learning About Physical Activity. \" Current as of: March 13, 2017 Content Version: 11.3 © 1345-0148 [employer]. Care instructions adapted under license by [employer]) (which disclaims liability or warranty for this information). If you have questions about a medical condition or this instruction, always ask your healthcare professional. [address] any warranty or liability for your use of this information. Introducing [facility]! Renata Hua introduces "[employer]" patient portal. Now you can access parts of your medical record, email your doctor's office, and request medication refills online. 1. In your internet browser, go to [email or web address] 2. Click on the First Time User? Click Here link in the Sign In box. You will see the New Member Sign Up page. 3. Enter your "[employer]" Access Code exactly as it appears below. You will not need to use this code after youve completed the sign-up process. If you do not sign up before the expiration date, you must request a new code. · "[employer]" Access Code: [email or web address] Expires: 8/7/2017  9:01 AM 
 
4. Enter the last four digits of your Social Security Number (xxxx) and Date of Birth (mm/dd/yyyy) as indicated and click Submit. You will be taken to the next sign-up page. 5. Create a Chegongfangt ID. This will be your "[employer]" login ID and cannot be changed, so think of one that is secure and easy to remember. 6. Create a "[employer]" password. You can change your password at any time. 7. Enter your Password Reset Question and Answer. This can be used at a later time if you forget your password. 8. Enter your e-mail address. You will receive e-mail notification when new information is available in [address] 9. Click Sign Up. You can now view and download portions of your medical record. 10. Click the Download Summary menu link to download a portable copy of your medical information. If you have questions, please visit the Frequently Asked Questions section of the [employer] website. Remember, [employer] is NOT to be used for urgent needs. For medical emergencies, dial 911. Now available from your iPhone and Android! Please provide this summary of care documentation to your next provider. Your primary care clinician is listed as Joshua Lebron. If you have any questions after today's visit, please call [phone].

## 2017-07-06 NOTE — PATIENT INSTRUCTIONS

## 2017-08-23 ENCOUNTER — TELEPHONE (OUTPATIENT)
Dept: SURGERY | Age: 37
End: 2017-08-23

## 2017-09-28 ENCOUNTER — OFFICE VISIT (OUTPATIENT)
Dept: SURGERY | Age: 37
End: 2017-09-28

## 2017-09-28 VITALS
TEMPERATURE: 98.1 F | DIASTOLIC BLOOD PRESSURE: 82 MMHG | OXYGEN SATURATION: 98 % | RESPIRATION RATE: 16 BRPM | BODY MASS INDEX: 33.55 KG/M2 | WEIGHT: 226.5 LBS | SYSTOLIC BLOOD PRESSURE: 122 MMHG | HEART RATE: 88 BPM | HEIGHT: 69 IN

## 2017-09-28 DIAGNOSIS — E55.9 VITAMIN D DEFICIENCY: ICD-10-CM

## 2017-09-28 DIAGNOSIS — E66.01 MORBID OBESITY DUE TO EXCESS CALORIES (HCC): Primary | ICD-10-CM

## 2017-09-28 NOTE — PROGRESS NOTES
1. Have you been to the ER, urgent care clinic since your last visit? Hospitalized since your last visit? No    2. Have you seen or consulted any other health care providers outside of the [address] [place] [address] since your last visit? Include any pap smears or colon screening. No     Zahida Perez  Body composition    female  40 y.o. Vitals:    09/28/17 1332   BP: 122/82   Pulse: 88   Resp: 16   Temp: 98.1 °F (36.7 °C)   SpO2: 98%   Weight: 226 lb 8 oz (102.7 kg)   Height: 5' 9\" (1.753 m)     Body mass index is 33.45 kg/(m^2). H&P weight on 5/9/17 was 297 pounds.

## 2017-09-28 NOTE — PROGRESS NOTES
HISTORY OF PRESENT Jb Pyle is a 40 y.o. female with previous Sleeve gastrectomy 4 months ago. She has lost a total of 70.5  pounds since surgery. She  Has lost 38.5 lbs since the last ov. Body mass index is 33.45 kg/(m^2). . no nausea and no vomiting . Denies Acid reflux/heartburn. . Drinking  50+ ounces of water daily. 50+  protein intake daily. + BM's. Pt is not exercise. She states that she is having some dizziness when she is changing positions. Dietary recall -    Breakfast- shake  Snack- shake  Lunch greek yogurt, bean and cheese 12/ bagel thin with laughing cow  Dinner- soups      She is snacking between meals; bean crisp. Vitamins:  MVI : yes  Calcium : yes  B-Vit 12: yes    Patient has an advanced directive: {not on file. Ms. Lindsay Humphrey has a reminder for a \"due or due soon\" health maintenance. I have asked that she contact her primary care provider for follow-up on this health maintenance. Co-Morbid(s)     Resolved      Was anti coagulation initiated for presumed / confirmed DVT/PE? NO    Was an incisional hernia noted on exam?       NO      COMORBIDITY    no comorbities      Current Outpatient Prescriptions:     cyanocobalamin, vitamin B-12, 1,000 mcg/mL kit, 1 Dose by Injection route every thirty (30) days. , Disp: 12 Dose, Rfl: 0    Syringe with Needle, Safety 3 mL 25 gauge x 1\" syrg, 1 Units by Does Not Apply route every thirty (30) days. , Disp: 12 Pen Needle, Rfl: 0    dextroamphetamine-amphetamine (ADDERALL) 20 mg tablet, Take 20 mg by mouth daily. , Disp: , Rfl:     multivitamin (ONE A DAY) tablet, Take 1 Tab by mouth daily. , Disp: , Rfl:     sennosides (SENNA) 8.6 mg cap, Take  by mouth., Disp: , Rfl:     HYDROmorphone (DILAUDID) 2 mg tablet, Take 1-2 Tabs by mouth every six (6) hours as needed for Pain. Max Daily Amount: 12 mg., Disp: 32 Tab, Rfl: 0    LORazepam (ATIVAN) 0.5 mg tablet, Take 1 Tab by mouth every eight (8) hours as needed for Anxiety.  Max Daily Amount: 1.5 mg., Disp: 15 Tab, Rfl: 0    busPIRone (BUSPAR) 10 mg tablet, Take 10 mg by mouth three (3) times daily. , Disp: , Rfl:     hyoscyamine SL (LEVSIN/SL) 0.125 mg SL tablet, 1 Tab by SubLINGual route every four (4) hours as needed for Cramping., Disp: 30 Tab, Rfl: 0    ondansetron (ZOFRAN ODT) 4 mg disintegrating tablet, Take 1 Tab by mouth every eight (8) hours as needed for Nausea., Disp: 30 Tab, Rfl: 0    Omeprazole delayed release (PRILOSEC D/R) 20 mg tablet, Take 1 Tab by mouth daily. , Disp: 30 Tab, Rfl: 5    ergocalciferol (ERGOCALCIFEROL) 50,000 unit capsule, Take 1 Cap by mouth every Monday and Friday., Disp: 8 Cap, Rfl: 2      Visit Vitals    /82 (BP 1 Location: Left arm, BP Patient Position: Sitting)    Pulse 88    Temp 98.1 °F (36.7 °C)    Resp 16    Ht 5' 9\" (1.753 m)    Wt 226 lb 8 oz (102.7 kg)    SpO2 98%    BMI 33.45 kg/m2       HPI    Review of Systems   Constitutional: Negative for chills and fever. Respiratory: Negative for shortness of breath. Cardiovascular: Negative for chest pain. Gastrointestinal: Negative for abdominal pain, heartburn, nausea and vomiting. Neurological: Positive for dizziness (when she changes postition). Physical Exam   Constitutional: She is oriented to person, place, and time. She appears well-developed and well-nourished. Cardiovascular: Normal rate and regular rhythm. Exam reveals no gallop and no friction rub. No murmur heard. Pulmonary/Chest: Effort normal and breath sounds normal.   Abdominal: Soft. Bowel sounds are normal. She exhibits no distension. There is no tenderness. No masses or hernias noted. Neurological: She is alert and oriented to person, place, and time. Skin: Skin is warm and dry. Psychiatric: She has a normal mood and affect. MICHELET and Chata Krishna is a 40 y.o. female with previous Sleeve gastrectomy 4 months ago. She has lost a total of 70.5  pounds since surgery.  She Has lost 38.5 lbs since the last ov. Body mass index is 33.45 kg/(m^2). Patient is to continue a high protein, low-fat, low-sugar diet for life. Take vitamins and minerals daily for life. Include protein at each meal.  Avoid high fat food items. Avoid \"added\" sugar. Chew well and eat slowly. Take 20-30 minutes to complete a meal.  Measure foods and read labels. Start exercising  Start Powerade zero, G2 and pickles as needed to increase sodium and help with dizziness. Recheck Vit D. Stop Prilosec  Follow up in 3 months. Offered RD if needed since she vegan. 21 minutes spent face to face with patient, >50% of time spent counseling.      [address] [place] [address] [place]

## 2017-09-28 NOTE — MR AVS SNAPSHOT
Visit Information Date & Time Provider Department Dept. Phone Encounter #  
 9/28/2017  1:20 PM Ilda Reece NP [phone] [id number] Upcoming Health Maintenance Date Due Pneumococcal 19-64 Medium Risk (1 of 1 - PPSV23) 6/13/1999 DTaP/Tdap/Td series (1 - Tdap) 6/13/2001 PAP AKA CERVICAL CYTOLOGY 6/13/2001 INFLUENZA AGE 9 TO ADULT 8/1/2017 Allergies as of 9/28/2017  Review Complete On: 9/28/2017 By: Ilda Reece NP Severity Noted Reaction Type Reactions Other Food  03/02/2017    Other (comments) Walnuts and pineapples cause mouth irritation. Current Immunizations  Never Reviewed No immunizations on file. Not reviewed this visit You Were Diagnosed With   
  
 Codes Comments Vitamin D deficiency    -  Primary ICD-10-CM: E55.9 ICD-9-CM: 268.9 Morbid obesity due to excess calories (HCC)     ICD-10-CM: E66.01 
ICD-9-CM: 278.01   
 BMI 33.0-33.9,adult     ICD-10-CM: K33.83 
ICD-9-CM: V85.33 Vitals BP Pulse Temp Resp Height(growth percentile) Weight(growth percentile) 122/82 (BP 1 Location: Left arm, BP Patient Position: Sitting) 88 98.1 °F (36.7 °C) 16 5' 9\" (1.753 m) 226 lb 8 oz (102.7 kg) SpO2 BMI OB Status Smoking Status 98% 33.45 kg/m2 Having regular periods Never Smoker BMI and BSA Data Body Mass Index Body Surface Area  
 33.45 kg/m 2 2.24 m 2 Preferred Pharmacy Pharmacy Name Phone [id number] [place] [place] - [address] [facility] AT [address] [place] [address] [phone] Your Updated Medication List  
  
   
This list is accurate as of: 9/28/17  2:04 PM.  Always use your most recent med list.  
  
  
  
  
 busPIRone 10 mg tablet Commonly known as:  BUSPAR Take 10 mg by mouth three (3) times daily. cyanocobalamin (vitamin B-12) 1,000 mcg/mL Kit 1 Dose by Injection route every thirty (30) days. dextroamphetamine-amphetamine 20 mg tablet Commonly known as:  ADDERALL Take 20 mg by mouth daily. ergocalciferol 50,000 unit capsule Commonly known as:  ERGOCALCIFEROL Take 1 Cap by mouth every Monday and Friday. HYDROmorphone 2 mg tablet Commonly known as:  DILAUDID Take 1-2 Tabs by mouth every six (6) hours as needed for Pain. Max Daily Amount: 12 mg.  
  
 hyoscyamine SL 0.125 mg SL tablet Commonly known as:  LEVSIN/SL  
1 Tab by SubLINGual route every four (4) hours as needed for Cramping. LORazepam 0.5 mg tablet Commonly known as:  ATIVAN Take 1 Tab by mouth every eight (8) hours as needed for Anxiety. Max Daily Amount: 1.5 mg.  
  
 multivitamin tablet Commonly known as:  ONE A DAY Take 1 Tab by mouth daily. Omeprazole delayed release 20 mg tablet Commonly known as:  PRILOSEC D/R Take 1 Tab by mouth daily. ondansetron 4 mg disintegrating tablet Commonly known as:  ZOFRAN ODT Take 1 Tab by mouth every eight (8) hours as needed for Nausea. Senna 8.6 mg Cap Generic drug:  sennosides Take  by mouth. Syringe with Needle, Safety 3 mL 25 gauge x 1\" Syrg  
1 Units by Does Not Apply route every thirty (30) days. We Performed the Following VITAMIN D, 25 HYDROXY [id number] CPT(R)] Patient Instructions Learning About Physical Activity What is physical activity? Physical activity is any kind of activity that gets your body moving. The types of physical activity that can help you get fit and stay healthy include: · Aerobic or \"cardio\" activities that make your heart beat faster and make you breathe harder, such as brisk walking, riding a bike, or running. Aerobic activities strengthen your heart and lungs and build up your endurance. · Strength training activities that make your muscles work against, or \"resist,\" something, such as lifting weights or doing push-ups.  These activities help tone and strengthen your muscles. · Stretches that allow you to move your joints and muscles through their full range of motion. Stretching helps you be more flexible and avoid injury. What are the benefits of physical activity? Being active is one of the best things you can do to get fit and stay healthy. It helps you to: · Feel stronger and have more energy to do all the things you like to do. · Focus better at school or work and perform better in sports. · Feel, think, and sleep better. · Reach and stay at a healthy weight. · Lose fat and build lean muscle. · Lower your risk for serious health problems. · Keep your bones, muscles, and joints strong. Being fit lets you do more physical activity. And it lets you work out harder without as much effort. How can you make physical activity part of your life? Get at least 30 minutes of exercise on most days of the week. Walking is a good choice. You also may want to do other activities, such as running, swimming, cycling, or playing tennis or team sports. Pick activities that you likeones that make your heart beat faster, your muscles stronger, and your muscles and joints more flexible. If you find more than one thing you like doing, do them all. You don't have to do the same thing every day. Get your heart pumping every day. Any activity that makes your heart beat faster and keeps it at that rate for a while counts. Here are some great ways to get your heart beating faster: · Go for a brisk walk, run, or bike ride. · Go for a hike or swim. · Go in-line skating. · Play a game of touch football, basketball, or soccer. · Ride a bike. · Play tennis or racquetball. · Climb stairs. Even some household chores can be aerobicjust do them at a faster pace. Vacuuming, raking or mowing the lawn, sweeping the garage, and washing and waxing the car all can help get your heart rate up. Strengthen your muscles during the week. You don't have to lift heavy weights or grow big, bulky muscles to get stronger. Doing a few simple activities that make your muscles work against, or \"resist,\" something can help you get stronger. For example, you can: · Do push-ups or sit-ups, which use your own body weight as resistance. · Lift weights or dumbbells or use stretch bands at home or in a gym or community center. Stretch your muscles often. Stretching will help you as you become more active. It can help you stay flexible, loosen tight muscles, and avoid injury. It can also help improve your balance and posture and can be a great way to relax. Be sure to stretch the muscles you'll be using when you work out. It's best to warm your muscles slightly before you stretch them. Walk or do some other light aerobic activity for a few minutes, and then start stretching. When you stretch your muscles: · Do it slowly. Stretching is not about going fast or making sudden movements. · Don't push or bounce during a stretch. · Hold each stretch for at least 15 to 30 seconds, if you can. You should feel a stretch in the muscle, but not pain. · Breathe out as you do the stretch. Then breathe in as you hold the stretch. Don't hold your breath. If you're worried about how more activity might affect your health, have a checkup before you start. Follow any special advice your doctor gives you for getting a smart start. Where can you learn more? Go to http://jessee-gonzalo.info/. Enter V741 in the search box to learn more about \"Learning About Physical Activity. \" Current as of: March 13, 2017 Content Version: 11.3 © 2237-2173 [employer]. Care instructions adapted under license by [employer] (which disclaims liability or warranty for this information).  If you have questions about a medical condition or this instruction, always ask your healthcare professional. [employer] disclaims any warranty or liability for your use of this information. Introducing [facility] & HEALTH SERVICES! Jona Brock introduces Intio patient portal. Now you can access parts of your medical record, email your doctor's office, and request medication refills online. 1. In your internet browser, go to [email or web address] 2. Click on the First Time User? Click Here link in the Sign In box. You will see the New Member Sign Up page. 3. Enter your Intio Access Code exactly as it appears below. You will not need to use this code after youve completed the sign-up process. If you do not sign up before the expiration date, you must request a new code. · Intio Access Code: [email or web address] Expires: 12/27/2017  2:04 PM 
 
4. Enter the last four digits of your Social Security Number (xxxx) and Date of Birth (mm/dd/yyyy) as indicated and click Submit. You will be taken to the next sign-up page. 5. Create a Intio ID. This will be your Intio login ID and cannot be changed, so think of one that is secure and easy to remember. 6. Create a Intio password. You can change your password at any time. 7. Enter your Password Reset Question and Answer. This can be used at a later time if you forget your password. 8. Enter your e-mail address. You will receive e-mail notification when new information is available in [address] 9. Click Sign Up. You can now view and download portions of your medical record. 10. Click the Download Summary menu link to download a portable copy of your medical information. If you have questions, please visit the Frequently Asked Questions section of the Intio website. Remember, Intio is NOT to be used for urgent needs. For medical emergencies, dial 911. Now available from your iPhone and Android! Please provide this summary of care documentation to your next provider. Your primary care clinician is listed as Joshua Lebron. If you have any questions after today's visit, please call [phone].

## 2017-09-28 NOTE — PATIENT INSTRUCTIONS

## 2017-09-29 LAB — 25(OH)D3+25(OH)D2 SERPL-MCNC: 24 NG/ML (ref 30–100)

## 2017-10-03 DIAGNOSIS — E55.9 VITAMIN D DEFICIENCY: Primary | ICD-10-CM

## 2017-10-03 RX ORDER — ERGOCALCIFEROL 1.25 MG/1
50000 CAPSULE ORAL
Qty: 4 CAP | Refills: 2 | Status: SHIPPED | OUTPATIENT
Start: 2017-10-03

## 2017-10-05 ENCOUNTER — TELEPHONE (OUTPATIENT)
Dept: SURGERY | Age: 37
End: 2017-10-05

## 2018-01-25 ENCOUNTER — TELEPHONE (OUTPATIENT)
Dept: SURGERY | Age: 38
End: 2018-01-25

## 2018-02-01 ENCOUNTER — OFFICE VISIT (OUTPATIENT)
Dept: SURGERY | Age: 38
End: 2018-02-01

## 2018-02-01 VITALS
TEMPERATURE: 98 F | OXYGEN SATURATION: 97 % | BODY MASS INDEX: 27.99 KG/M2 | HEART RATE: 96 BPM | WEIGHT: 189 LBS | RESPIRATION RATE: 16 BRPM | HEIGHT: 69 IN | SYSTOLIC BLOOD PRESSURE: 120 MMHG | DIASTOLIC BLOOD PRESSURE: 80 MMHG

## 2018-02-01 DIAGNOSIS — Z98.84 S/P LAPAROSCOPIC SLEEVE GASTRECTOMY: ICD-10-CM

## 2018-02-01 DIAGNOSIS — E66.01 MORBID OBESITY (HCC): Primary | ICD-10-CM

## 2018-02-01 DIAGNOSIS — E61.1 IRON DEFICIENCY: ICD-10-CM

## 2018-02-01 DIAGNOSIS — E53.8 VITAMIN B12 DEFICIENCY: ICD-10-CM

## 2018-02-01 DIAGNOSIS — E55.9 VITAMIN D DEFICIENCY: ICD-10-CM

## 2018-02-01 NOTE — MR AVS SNAPSHOT
[address] [place] [address] [address] [id number] 
[phone] Patient: Juan Rodney MRN: [id number] PBP:4[birth year]01/[birth year]395 Visit Information Date & Time Provider Department Dept. Phone Encounter #  
 2/1/2018  1:00 PM KARSTEN Botello 137 [phone] [id number] Upcoming Health Maintenance Date Due Pneumococcal 19-64 Medium Risk (1 of 1 - PPSV23) 6/13/1999 DTaP/Tdap/Td series (1 - Tdap) 6/13/2001 PAP AKA CERVICAL CYTOLOGY 6/13/2001 Influenza Age 5 to Adult 8/1/2017 Allergies as of 2/1/2018  Review Complete On: 2/1/2018 By: Genna Ling LPN Severity Noted Reaction Type Reactions Other Food  03/02/2017    Other (comments) Walnuts and pineapples cause mouth irritation. Current Immunizations  Never Reviewed No immunizations on file. Not reviewed this visit You Were Diagnosed With   
  
 Codes Comments Morbid obesity ([facility] [address]    -  Primary ICD-10-CM: E66.01 
ICD-9-CM: 278.01   
 BMI 27.0-27.9,adult     ICD-10-CM: E13.84 ICD-9-CM: V85.23 S/P laparoscopic sleeve gastrectomy     ICD-10-CM: S55.42 ICD-9-CM: V45.86 Vitamin D deficiency     ICD-10-CM: E55.9 ICD-9-CM: 268.9 Vitamin B12 deficiency     ICD-10-CM: E53.8 ICD-9-CM: 266.2 Iron deficiency     ICD-10-CM: E61.1 ICD-9-CM: 280.9 Vitals BP Pulse Temp Resp Height(growth percentile) Weight(growth percentile) 120/80 (BP 1 Location: Left arm, BP Patient Position: Sitting) 96 98 °F (36.7 °C) (Oral) 16 5' 9\" (1.753 m) 189 lb (85.7 kg) SpO2 BMI OB Status Smoking Status 97% 27.91 kg/m2 Having regular periods Never Smoker BMI and BSA Data Body Mass Index Body Surface Area  
 27.91 kg/m 2 2.04 m 2 Preferred Pharmacy Pharmacy Name Phone [id number] [place], [address] [facility] [phone] Your Updated Medication List  
  
   
This list is accurate as of: 18  2:38 PM.  Always use your most recent med list.  
  
  
  
  
 busPIRone 10 mg tablet Commonly known as:  BUSPAR Take 10 mg by mouth three (3) times daily. cyanocobalamin (vitamin B-12) 1,000 mcg/mL Kit 1 Dose by Injection route every thirty (30) days. dextroamphetamine-amphetamine 20 mg tablet Commonly known as:  ADDERALL Take 20 mg by mouth daily. ergocalciferol 50,000 unit capsule Commonly known as:  ERGOCALCIFEROL Take 1 Cap by mouth every seven (7) days. Indications: VITAMIN D DEFICIENCY (HIGH DOSE THERAPY)  
  
 hyoscyamine SL 0.125 mg SL tablet Commonly known as:  LEVSIN/SL  
1 Tab by SubLINGual route every four (4) hours as needed for Cramping. LORazepam 0.5 mg tablet Commonly known as:  ATIVAN Take 1 Tab by mouth every eight (8) hours as needed for Anxiety. Max Daily Amount: 1.5 mg.  
  
 multivitamin tablet Commonly known as:  ONE A DAY Take 1 Tab by mouth daily. Omeprazole delayed release 20 mg tablet Commonly known as:  PRILOSEC D/R Take 1 Tab by mouth daily. Syringe with Needle (Disp) 3 mL 25 gauge x 1\" Syrg Commonly known as:  MONOJECT 3CC SYR 25GX1\" 1 Syringe by Does Not Apply route every thirty (30) days. Syringe with Needle, Safety 3 mL 25 gauge x 1\" Syrg  
1 Units by Does Not Apply route every thirty (30) days. Prescriptions Sent to Pharmacy Refills Syringe with Needle, Disp, (MONOJECT 3CC SYR 25GX1\") 3 mL 25 gauge x 1\" syrg 3 Si Syringe by Does Not Apply route every thirty (30) days. Class: Normal  
 [facility], [address] [place] [address] Ph #: [phone] Route: Does Not Apply We Performed the Following CBC W/O DIFF [99762 CPT(R)] METABOLIC PANEL, COMPREHENSIVE [76042 CPT(R)] PREALBUMIN [59353 CPT(R)] VITAMIN B12 [id number] CPT(R)] VITAMIN D, 25 HYDROXY [id number] CPT(R)] Patient Instructions Learning About Physical Activity What is physical activity? Physical activity is any kind of activity that gets your body moving. The types of physical activity that can help you get fit and stay healthy include: · Aerobic or \"cardio\" activities that make your heart beat faster and make you breathe harder, such as brisk walking, riding a bike, or running. Aerobic activities strengthen your heart and lungs and build up your endurance. · Strength training activities that make your muscles work against, or \"resist,\" something, such as lifting weights or doing push-ups. These activities help tone and strengthen your muscles. · Stretches that allow you to move your joints and muscles through their full range of motion. Stretching helps you be more flexible and avoid injury. What are the benefits of physical activity? Being active is one of the best things you can do to get fit and stay healthy. It helps you to: · Feel stronger and have more energy to do all the things you like to do. · Focus better at school or work and perform better in sports. · Feel, think, and sleep better. · Reach and stay at a healthy weight. · Lose fat and build lean muscle. · Lower your risk for serious health problems. · Keep your bones, muscles, and joints strong. Being fit lets you do more physical activity. And it lets you work out harder without as much effort. How can you make physical activity part of your life? Get at least 30 minutes of exercise on most days of the week. Walking is a good choice. You also may want to do other activities, such as running, swimming, cycling, or playing tennis or team sports. Pick activities that you like-ones that make your heart beat faster, your muscles stronger, and your muscles and joints more flexible. If you find more than one thing you like doing, do them all. You don't have to do the same thing every day. Get your heart pumping every day. Any activity that makes your heart beat faster and keeps it at that rate for a while counts. Here are some great ways to get your heart beating faster: · Go for a brisk walk, run, or bike ride. · Go for a hike or swim. · Go in-line skating. · Play a game of touch football, basketball, or soccer. · Ride a bike. · Play tennis or racquetball. · Climb stairs. Even some household chores can be aerobic-just do them at a faster pace. Vacuuming, raking or mowing the lawn, sweeping the garage, and washing and waxing the car all can help get your heart rate up. Strengthen your muscles during the week. You don't have to lift heavy weights or grow big, bulky muscles to get stronger. Doing a few simple activities that make your muscles work against, or \"resist,\" something can help you get stronger. For example, you can: · Do push-ups or sit-ups, which use your own body weight as resistance. · Lift weights or dumbbells or use stretch bands at home or in a gym or community center. Stretch your muscles often. Stretching will help you as you become more active. It can help you stay flexible, loosen tight muscles, and avoid injury. It can also help improve your balance and posture and can be a great way to relax. Be sure to stretch the muscles you'll be using when you work out. It's best to warm your muscles slightly before you stretch them. Walk or do some other light aerobic activity for a few minutes, and then start stretching. When you stretch your muscles: · Do it slowly. Stretching is not about going fast or making sudden movements. · Don't push or bounce during a stretch. · Hold each stretch for at least 15 to 30 seconds, if you can. You should feel a stretch in the muscle, but not pain. · Breathe out as you do the stretch. Then breathe in as you hold the stretch. Don't hold your breath.  
If you're worried about how more activity might affect your health, have a checkup before you start. Follow any special advice your doctor gives you for getting a smart start. Where can you learn more? Go to http://jessee-gonzalo.info/. Enter Z036 in the search box to learn more about \"Learning About Physical Activity. \" Current as of: March 13, 2017 Content Version: 11.4 © 3420-3233 Perfect. Care instructions adapted under license by [employer] (which disclaims liability or warranty for this information). If you have questions about a medical condition or this instruction, always ask your healthcare professional. [address] any warranty or liability for your use of this information. Introducing [facility] & HEALTH SERVICES! Dear John Mata: Thank you for requesting a Ecast account. Our records indicate that you already have an active Ecast account. You can access your account anytime at [email or web address] Did you know that you can access your hospital and ER discharge instructions at any time in Ecast? You can also review all of your test results from your hospital stay or ER visit. Additional Information If you have questions, please visit the Frequently Asked Questions section of the Ecast website at [email or web address] Remember, Ecast is NOT to be used for urgent needs. For medical emergencies, dial 911. Now available from your iPhone and Android! Please provide this summary of care documentation to your next provider. Your primary care clinician is listed as Joshua Lebron. If you have any questions after today's visit, please call [phone].

## 2018-02-01 NOTE — PATIENT INSTRUCTIONS

## 2018-02-01 NOTE — PROGRESS NOTES
HISTORY OF PRESENT ILLNESS  Zahida Mcarthur is a 40 y.o. female with previous Sleeve gastrectomy on 5/25/2017. She has lost a total of 108 pounds since surgery. She  Has lost 37.5 lbs since the last ov. Body mass index is 27.91 kg/(m^2). . no nausea and no vomiting . Denies Acid reflux/heartburn. . Drinking  30-40 ounces of water daily. 50-60 grams protein intake daily. + BM's. Pt is not exercising. Dietary recall -    Breakfast- shakes  Lunch- shakes  Dinner- lentils, cheese    She is not snacking between meals; Leidy Pummel Vitamins:  MVI : no  Calcium : no  B-Vit 12: no    Patient has an advanced directive: not on file. Ms. Andi Del Valle has a reminder for a \"due or due soon\" health maintenance. I have asked that she contact her primary care provider for follow-up on this health maintenance. Co-Morbid(s)  No comorbids       Current Outpatient Prescriptions:     ergocalciferol (ERGOCALCIFEROL) 50,000 unit capsule, Take 1 Cap by mouth every seven (7) days. Indications: VITAMIN D DEFICIENCY (HIGH DOSE THERAPY), Disp: 4 Cap, Rfl: 2    cyanocobalamin, vitamin B-12, 1,000 mcg/mL kit, 1 Dose by Injection route every thirty (30) days. , Disp: 12 Dose, Rfl: 0    Syringe with Needle, Safety 3 mL 25 gauge x 1\" syrg, 1 Units by Does Not Apply route every thirty (30) days. , Disp: 12 Pen Needle, Rfl: 0    dextroamphetamine-amphetamine (ADDERALL) 20 mg tablet, Take 20 mg by mouth daily. , Disp: , Rfl:     multivitamin (ONE A DAY) tablet, Take 1 Tab by mouth daily. , Disp: , Rfl:     LORazepam (ATIVAN) 0.5 mg tablet, Take 1 Tab by mouth every eight (8) hours as needed for Anxiety. Max Daily Amount: 1.5 mg., Disp: 15 Tab, Rfl: 0    busPIRone (BUSPAR) 10 mg tablet, Take 10 mg by mouth three (3) times daily. , Disp: , Rfl:     hyoscyamine SL (LEVSIN/SL) 0.125 mg SL tablet, 1 Tab by SubLINGual route every four (4) hours as needed for Cramping., Disp: 30 Tab, Rfl: 0    Omeprazole delayed release (PRILOSEC D/R) 20 mg tablet, Take 1 Tab by mouth daily. , Disp: 30 Tab, Rfl: 5      Visit Vitals    /80 (BP 1 Location: Left arm, BP Patient Position: Sitting)    Pulse 96    Temp 98 °F (36.7 °C) (Oral)    Resp 16    Ht 5' 9\" (1.753 m)    Wt 189 lb (85.7 kg)    SpO2 97%    BMI 27.91 kg/m2     HPI    Review of Systems   Constitutional: Negative for chills and fever. Respiratory: Negative for shortness of breath. Cardiovascular: Negative for chest pain. Gastrointestinal: Negative for abdominal pain, heartburn, nausea and vomiting. Genitourinary: Negative for dysuria. Physical Exam   Constitutional: She is oriented to person, place, and time. She appears well-developed and well-nourished. Cardiovascular: Normal rate and regular rhythm. Exam reveals no gallop and no friction rub. No murmur heard. Pulmonary/Chest: Effort normal and breath sounds normal.   Abdominal: Soft. Bowel sounds are normal. She exhibits no distension. There is no tenderness. No masses or hernias noted. Neurological: She is alert and oriented to person, place, and time. Skin: Skin is warm and dry. Psychiatric: She has a normal mood and affect. ASSESSMENT and PLAN  Marija Tesfaye is a 40 y.o. female with previous Sleeve gastrectomy on 5/25/2017. She has lost a total of 108 pounds since surgery. She  Has lost 37.5 lbs since the last ov. Body mass index is 27.91 kg/(m^2). Advised patient regard to diet that is high-protein, low-fat, low-sugar, limited carbohydrates. Strive for 50-60 grams of protein daily. If having a snack, foods that are protein or fiber rich. . No eating/drinking together, chew foods well, and portion control. Measure meals. Discussed snacking behavior and to [facility] pay attention to behavioral factor and habits. Drink at least 40-64 ounces of water or non-calorie/non-carbonated beverages daily. Resume vitamin regiment daily. Start Exercising at least 3 days a week with cardiovascular and strength training.  Patient to follow up in 2-3 months. Advised to call office if any questions/concerns. Check routine vitamin levels. 30 minutes spent face to face with a patient, >50% of time spent counseling. Pt verbalized understanding and questions were answered to the best of my knowledge and ability.

## 2018-02-01 NOTE — PROGRESS NOTES
1. Have you been to the ER, urgent care clinic since your last visit? Hospitalized since your last visit? No    2. Have you seen or consulted any other health care providers outside of the [address] [place] [address] since your last visit? Include any pap smears or colon screening. No     Zahida Perez  Body composition    female  40 y.o. Vitals:    02/01/18 1339   BP: 120/80   Pulse: 96   Resp: 16   Temp: 98 °F (36.7 °C)   TempSrc: Oral   SpO2: 97%   Weight: 189 lb (85.7 kg)   Height: 5' 9\" (1.753 m)     Body mass index is 27.91 kg/(m^2).

## 2018-02-02 LAB
25(OH)D3+25(OH)D2 SERPL-MCNC: 23.7 NG/ML (ref 30–100)
ALBUMIN SERPL-MCNC: 5.1 G/DL (ref 3.5–5.5)
ALBUMIN/GLOB SERPL: 2.3 {RATIO} (ref 1.2–2.2)
ALP SERPL-CCNC: 45 IU/L (ref 39–117)
ALT SERPL-CCNC: 7 IU/L (ref 0–32)
AST SERPL-CCNC: 11 IU/L (ref 0–40)
BILIRUB SERPL-MCNC: 0.5 MG/DL (ref 0–1.2)
BUN SERPL-MCNC: 16 MG/DL (ref 6–20)
BUN/CREAT SERPL: 25 (ref 9–23)
CALCIUM SERPL-MCNC: 9.6 MG/DL (ref 8.7–10.2)
CHLORIDE SERPL-SCNC: 99 MMOL/L (ref 96–106)
CO2 SERPL-SCNC: 23 MMOL/L (ref 18–29)
CREAT SERPL-MCNC: 0.63 MG/DL (ref 0.57–1)
ERYTHROCYTE [DISTWIDTH] IN BLOOD BY AUTOMATED COUNT: 13.7 % (ref 12.3–15.4)
GFR SERPLBLD CREATININE-BSD FMLA CKD-EPI: 115 ML/MIN/1.73
GFR SERPLBLD CREATININE-BSD FMLA CKD-EPI: 133 ML/MIN/1.73
GLOBULIN SER CALC-MCNC: 2.2 G/DL (ref 1.5–4.5)
GLUCOSE SERPL-MCNC: 95 MG/DL (ref 65–99)
HCT VFR BLD AUTO: 39 % (ref 34–46.6)
HGB BLD-MCNC: 12.7 G/DL (ref 11.1–15.9)
MCH RBC QN AUTO: 29.5 PG (ref 26.6–33)
MCHC RBC AUTO-ENTMCNC: 32.6 G/DL (ref 31.5–35.7)
MCV RBC AUTO: 91 FL (ref 79–97)
PLATELET # BLD AUTO: 306 X10E3/UL (ref 150–379)
POTASSIUM SERPL-SCNC: 3.8 MMOL/L (ref 3.5–5.2)
PREALB SERPL-MCNC: 20 MG/DL (ref 14–35)
PROT SERPL-MCNC: 7.3 G/DL (ref 6–8.5)
RBC # BLD AUTO: 4.31 X10E6/UL (ref 3.77–5.28)
SODIUM SERPL-SCNC: 138 MMOL/L (ref 134–144)
VIT B12 SERPL-MCNC: 323 PG/ML (ref 232–1245)
WBC # BLD AUTO: 6.4 X10E3/UL (ref 3.4–10.8)

## 2018-02-21 ENCOUNTER — TELEPHONE (OUTPATIENT)
Dept: SURGERY | Age: 38
End: 2018-02-21

## 2018-02-21 NOTE — TELEPHONE ENCOUNTER
Spoke with patient. She never took the Vit D that was called in October. Her Vit D level was about the same. Advised her to continue Vit D 50,000 units every 7 days until prescription is finished the start taking 5,000 international units daily. Pt in agreement. Continue other routine vitamin regimen.

## 2018-03-26 ENCOUNTER — TELEPHONE (OUTPATIENT)
Dept: SURGERY | Age: 38
End: 2018-03-26

## 2018-08-31 ENCOUNTER — ANESTHESIA (OUTPATIENT)
Dept: SURGERY | Age: 38
End: 2018-08-31
Payer: COMMERCIAL

## 2018-08-31 ENCOUNTER — APPOINTMENT (OUTPATIENT)
Dept: CT IMAGING | Age: 38
End: 2018-08-31
Attending: PHYSICIAN ASSISTANT
Payer: COMMERCIAL

## 2018-08-31 ENCOUNTER — HOSPITAL ENCOUNTER (OUTPATIENT)
Age: 38
Setting detail: OBSERVATION
Discharge: HOME | End: 2018-09-01
Attending: EMERGENCY MEDICINE | Admitting: SURGERY
Payer: COMMERCIAL

## 2018-08-31 ENCOUNTER — ANESTHESIA EVENT (OUTPATIENT)
Dept: SURGERY | Age: 38
End: 2018-08-31
Payer: COMMERCIAL

## 2018-08-31 DIAGNOSIS — K35.80 ACUTE APPENDICITIS, UNSPECIFIED ACUTE APPENDICITIS TYPE: Primary | ICD-10-CM

## 2018-08-31 LAB
ALBUMIN SERPL-MCNC: 4.6 G/DL (ref 3.5–5)
ALBUMIN/GLOB SERPL: 1.5 {RATIO} (ref 1.1–2.2)
ALP SERPL-CCNC: 44 U/L (ref 45–117)
ALT SERPL-CCNC: 16 U/L (ref 12–78)
ANION GAP SERPL CALC-SCNC: 9 MMOL/L (ref 5–15)
APPEARANCE UR: CLEAR
AST SERPL-CCNC: 12 U/L (ref 15–37)
BACTERIA URNS QL MICRO: NEGATIVE /HPF
BASOPHILS # BLD: 0 K/UL (ref 0–0.1)
BASOPHILS NFR BLD: 0 % (ref 0–1)
BILIRUB SERPL-MCNC: 0.5 MG/DL (ref 0.2–1)
BILIRUB UR QL: NEGATIVE
BUN SERPL-MCNC: 18 MG/DL (ref 6–20)
BUN/CREAT SERPL: 30 (ref 12–20)
CALCIUM SERPL-MCNC: 8.7 MG/DL (ref 8.5–10.1)
CHLORIDE SERPL-SCNC: 107 MMOL/L (ref 97–108)
CO2 SERPL-SCNC: 25 MMOL/L (ref 21–32)
COLOR UR: ABNORMAL
COMMENT, HOLDF: NORMAL
CREAT SERPL-MCNC: 0.6 MG/DL (ref 0.55–1.02)
DIFFERENTIAL METHOD BLD: ABNORMAL
EOSINOPHIL # BLD: 0 K/UL (ref 0–0.4)
EOSINOPHIL NFR BLD: 0 % (ref 0–7)
EPITH CASTS URNS QL MICRO: ABNORMAL /LPF
ERYTHROCYTE [DISTWIDTH] IN BLOOD BY AUTOMATED COUNT: 12.2 % (ref 11.5–14.5)
GLOBULIN SER CALC-MCNC: 3.1 G/DL (ref 2–4)
GLUCOSE SERPL-MCNC: 87 MG/DL (ref 65–100)
GLUCOSE UR STRIP.AUTO-MCNC: NEGATIVE MG/DL
HCG UR QL: NEGATIVE
HCT VFR BLD AUTO: 34.6 % (ref 35–47)
HGB BLD-MCNC: 11.9 G/DL (ref 11.5–16)
HGB UR QL STRIP: NEGATIVE
IMM GRANULOCYTES # BLD: 0.1 K/UL (ref 0–0.04)
IMM GRANULOCYTES NFR BLD AUTO: 0 % (ref 0–0.5)
KETONES UR QL STRIP.AUTO: 40 MG/DL
LEUKOCYTE ESTERASE UR QL STRIP.AUTO: NEGATIVE
LIPASE SERPL-CCNC: 78 U/L (ref 73–393)
LYMPHOCYTES # BLD: 2.2 K/UL (ref 0.8–3.5)
LYMPHOCYTES NFR BLD: 14 % (ref 12–49)
MCH RBC QN AUTO: 31.1 PG (ref 26–34)
MCHC RBC AUTO-ENTMCNC: 34.4 G/DL (ref 30–36.5)
MCV RBC AUTO: 90.3 FL (ref 80–99)
MONOCYTES # BLD: 1 K/UL (ref 0–1)
MONOCYTES NFR BLD: 6 % (ref 5–13)
MUCOUS THREADS URNS QL MICRO: ABNORMAL /LPF
NEUTS SEG # BLD: 12.5 K/UL (ref 1.8–8)
NEUTS SEG NFR BLD: 79 % (ref 32–75)
NITRITE UR QL STRIP.AUTO: NEGATIVE
NRBC # BLD: 0 K/UL (ref 0–0.01)
NRBC BLD-RTO: 0 PER 100 WBC
PH UR STRIP: 5.5 [PH] (ref 5–8)
PLATELET # BLD AUTO: 267 K/UL (ref 150–400)
PMV BLD AUTO: 10 FL (ref 8.9–12.9)
POTASSIUM SERPL-SCNC: 3.2 MMOL/L (ref 3.5–5.1)
PROT SERPL-MCNC: 7.7 G/DL (ref 6.4–8.2)
PROT UR STRIP-MCNC: NEGATIVE MG/DL
RBC # BLD AUTO: 3.83 M/UL (ref 3.8–5.2)
RBC #/AREA URNS HPF: ABNORMAL /HPF (ref 0–5)
SAMPLES BEING HELD,HOLD: NORMAL
SODIUM SERPL-SCNC: 141 MMOL/L (ref 136–145)
SP GR UR REFRACTOMETRY: 1.03 (ref 1–1.03)
UR CULT HOLD, URHOLD: NORMAL
UROBILINOGEN UR QL STRIP.AUTO: 0.2 EU/DL (ref 0.2–1)
WBC # BLD AUTO: 15.8 K/UL (ref 3.6–11)
WBC URNS QL MICRO: ABNORMAL /HPF (ref 0–4)

## 2018-08-31 PROCEDURE — 74011250637 HC RX REV CODE- 250/637: Performed by: PHYSICIAN ASSISTANT

## 2018-08-31 PROCEDURE — 77030022474 HC RELD STPLR ENDO GIA COVD -C: Performed by: SURGERY

## 2018-08-31 PROCEDURE — 74011250636 HC RX REV CODE- 250/636: Performed by: ANESTHESIOLOGY

## 2018-08-31 PROCEDURE — 99284 EMERGENCY DEPT VISIT MOD MDM: CPT

## 2018-08-31 PROCEDURE — 77030011640 HC PAD GRND REM COVD -A: Performed by: SURGERY

## 2018-08-31 PROCEDURE — 76060000033 HC ANESTHESIA 1 TO 1.5 HR: Performed by: SURGERY

## 2018-08-31 PROCEDURE — 77030019908 HC STETH ESOPH SIMS -A: Performed by: ANESTHESIOLOGY

## 2018-08-31 PROCEDURE — 77030026438 HC STYL ET INTUB CARD -A: Performed by: ANESTHESIOLOGY

## 2018-08-31 PROCEDURE — 77030020263 HC SOL INJ SOD CL0.9% LFCR 1000ML: Performed by: SURGERY

## 2018-08-31 PROCEDURE — 77030002933 HC SUT MCRYL J&J -A: Performed by: SURGERY

## 2018-08-31 PROCEDURE — 74011000258 HC RX REV CODE- 258: Performed by: EMERGENCY MEDICINE

## 2018-08-31 PROCEDURE — 77030008756 HC TU IRR SUC STRY -B: Performed by: SURGERY

## 2018-08-31 PROCEDURE — 76210000017 HC OR PH I REC 1.5 TO 2 HR: Performed by: SURGERY

## 2018-08-31 PROCEDURE — 74011636320 HC RX REV CODE- 636/320: Performed by: EMERGENCY MEDICINE

## 2018-08-31 PROCEDURE — 74011250636 HC RX REV CODE- 250/636

## 2018-08-31 PROCEDURE — 77030034696 HC CATH URETH FOL 2W BARD -A: Performed by: SURGERY

## 2018-08-31 PROCEDURE — 81025 URINE PREGNANCY TEST: CPT

## 2018-08-31 PROCEDURE — 74177 CT ABD & PELVIS W/CONTRAST: CPT

## 2018-08-31 PROCEDURE — 76010000149 HC OR TIME 1 TO 1.5 HR: Performed by: SURGERY

## 2018-08-31 PROCEDURE — 81001 URINALYSIS AUTO W/SCOPE: CPT | Performed by: STUDENT IN AN ORGANIZED HEALTH CARE EDUCATION/TRAINING PROGRAM

## 2018-08-31 PROCEDURE — 77030020747 HC TU INSUF ENDOSC TELE -A: Performed by: SURGERY

## 2018-08-31 PROCEDURE — 77030032230 HC DSCTR ULTSONC CRDLSS COVD -E: Performed by: SURGERY

## 2018-08-31 PROCEDURE — 77030035029 HC NDL INSUF VERES DISP COVD -B: Performed by: SURGERY

## 2018-08-31 PROCEDURE — 77030009852 HC PCH RTVR ENDOSC COVD -B: Performed by: SURGERY

## 2018-08-31 PROCEDURE — 36415 COLL VENOUS BLD VENIPUNCTURE: CPT | Performed by: STUDENT IN AN ORGANIZED HEALTH CARE EDUCATION/TRAINING PROGRAM

## 2018-08-31 PROCEDURE — 77030031139 HC SUT VCRL2 J&J -A: Performed by: SURGERY

## 2018-08-31 PROCEDURE — 74011000250 HC RX REV CODE- 250

## 2018-08-31 PROCEDURE — 83690 ASSAY OF LIPASE: CPT | Performed by: STUDENT IN AN ORGANIZED HEALTH CARE EDUCATION/TRAINING PROGRAM

## 2018-08-31 PROCEDURE — 77030034849: Performed by: SURGERY

## 2018-08-31 PROCEDURE — 77030032490 HC SLV COMPR SCD KNE COVD -B: Performed by: SURGERY

## 2018-08-31 PROCEDURE — 77030008684 HC TU ET CUF COVD -B: Performed by: ANESTHESIOLOGY

## 2018-08-31 PROCEDURE — 77030035048 HC TRCR ENDOSC OPTCL COVD -B: Performed by: SURGERY

## 2018-08-31 PROCEDURE — 85025 COMPLETE CBC W/AUTO DIFF WBC: CPT | Performed by: STUDENT IN AN ORGANIZED HEALTH CARE EDUCATION/TRAINING PROGRAM

## 2018-08-31 PROCEDURE — 77030013079 HC BLNKT BAIR HGGR 3M -A: Performed by: ANESTHESIOLOGY

## 2018-08-31 PROCEDURE — 74011250636 HC RX REV CODE- 250/636: Performed by: PHYSICIAN ASSISTANT

## 2018-08-31 PROCEDURE — 80053 COMPREHEN METABOLIC PANEL: CPT | Performed by: STUDENT IN AN ORGANIZED HEALTH CARE EDUCATION/TRAINING PROGRAM

## 2018-08-31 RX ORDER — SODIUM CHLORIDE 0.9 % (FLUSH) 0.9 %
5-10 SYRINGE (ML) INJECTION AS NEEDED
Status: DISCONTINUED | OUTPATIENT
Start: 2018-08-31 | End: 2018-09-01 | Stop reason: HOSPADM

## 2018-08-31 RX ORDER — FENTANYL CITRATE 50 UG/ML
100 INJECTION, SOLUTION INTRAMUSCULAR; INTRAVENOUS
Status: ACTIVE | OUTPATIENT
Start: 2018-08-31 | End: 2018-09-01

## 2018-08-31 RX ORDER — SODIUM CHLORIDE 9 MG/ML
1000 INJECTION, SOLUTION INTRAVENOUS ONCE
Status: COMPLETED | OUTPATIENT
Start: 2018-08-31 | End: 2018-08-31

## 2018-08-31 RX ORDER — ALPRAZOLAM 0.25 MG/1
0.25 TABLET ORAL
Status: COMPLETED | OUTPATIENT
Start: 2018-08-31 | End: 2018-08-31

## 2018-08-31 RX ORDER — SODIUM CHLORIDE, SODIUM LACTATE, POTASSIUM CHLORIDE, CALCIUM CHLORIDE 600; 310; 30; 20 MG/100ML; MG/100ML; MG/100ML; MG/100ML
125 INJECTION, SOLUTION INTRAVENOUS CONTINUOUS
Status: DISCONTINUED | OUTPATIENT
Start: 2018-09-01 | End: 2018-09-01 | Stop reason: HOSPADM

## 2018-08-31 RX ORDER — SODIUM CHLORIDE 0.9 % (FLUSH) 0.9 %
10 SYRINGE (ML) INJECTION
Status: COMPLETED | OUTPATIENT
Start: 2018-08-31 | End: 2018-08-31

## 2018-08-31 RX ORDER — LIDOCAINE HYDROCHLORIDE 10 MG/ML
0.1 INJECTION, SOLUTION EPIDURAL; INFILTRATION; INTRACAUDAL; PERINEURAL AS NEEDED
Status: DISCONTINUED | OUTPATIENT
Start: 2018-08-31 | End: 2018-09-01 | Stop reason: HOSPADM

## 2018-08-31 RX ORDER — SODIUM CHLORIDE 0.9 % (FLUSH) 0.9 %
5-10 SYRINGE (ML) INJECTION EVERY 8 HOURS
Status: DISCONTINUED | OUTPATIENT
Start: 2018-09-01 | End: 2018-09-01 | Stop reason: HOSPADM

## 2018-08-31 RX ORDER — FENTANYL CITRATE 50 UG/ML
50 INJECTION, SOLUTION INTRAMUSCULAR; INTRAVENOUS AS NEEDED
Status: DISCONTINUED | OUTPATIENT
Start: 2018-08-31 | End: 2018-09-01 | Stop reason: HOSPADM

## 2018-08-31 RX ORDER — SODIUM CHLORIDE 9 MG/ML
25 INJECTION, SOLUTION INTRAVENOUS CONTINUOUS
Status: DISCONTINUED | OUTPATIENT
Start: 2018-09-01 | End: 2018-09-01 | Stop reason: HOSPADM

## 2018-08-31 RX ORDER — MIDAZOLAM HYDROCHLORIDE 1 MG/ML
1 INJECTION, SOLUTION INTRAMUSCULAR; INTRAVENOUS AS NEEDED
Status: DISCONTINUED | OUTPATIENT
Start: 2018-08-31 | End: 2018-09-01 | Stop reason: HOSPADM

## 2018-08-31 RX ADMIN — LIDOCAINE HYDROCHLORIDE 80 MG: 20 INJECTION, SOLUTION EPIDURAL; INFILTRATION; INTRACAUDAL; PERINEURAL at 23:43

## 2018-08-31 RX ADMIN — MIDAZOLAM HYDROCHLORIDE 2 MG: 1 INJECTION, SOLUTION INTRAMUSCULAR; INTRAVENOUS at 23:41

## 2018-08-31 RX ADMIN — ROCURONIUM BROMIDE 30 MG: 10 INJECTION, SOLUTION INTRAVENOUS at 23:43

## 2018-08-31 RX ADMIN — Medication 10 ML: at 22:23

## 2018-08-31 RX ADMIN — IOPAMIDOL 100 ML: 755 INJECTION, SOLUTION INTRAVENOUS at 22:23

## 2018-08-31 RX ADMIN — FENTANYL CITRATE 50 MCG: 50 INJECTION, SOLUTION INTRAMUSCULAR; INTRAVENOUS at 23:43

## 2018-08-31 RX ADMIN — CEFAZOLIN SODIUM 2 G: 1 INJECTION, POWDER, FOR SOLUTION INTRAMUSCULAR; INTRAVENOUS at 23:50

## 2018-08-31 RX ADMIN — ONDANSETRON 4 MG: 2 INJECTION INTRAMUSCULAR; INTRAVENOUS at 23:53

## 2018-08-31 RX ADMIN — SODIUM CHLORIDE, POTASSIUM CHLORIDE, SODIUM LACTATE AND CALCIUM CHLORIDE: 600; 310; 30; 20 INJECTION, SOLUTION INTRAVENOUS at 22:33

## 2018-08-31 RX ADMIN — DEXAMETHASONE SODIUM PHOSPHATE 8 MG: 4 INJECTION, SOLUTION INTRA-ARTICULAR; INTRALESIONAL; INTRAMUSCULAR; INTRAVENOUS; SOFT TISSUE at 23:53

## 2018-08-31 RX ADMIN — PROPOFOL 150 MG: 10 INJECTION, EMULSION INTRAVENOUS at 23:43

## 2018-08-31 RX ADMIN — ALPRAZOLAM 0.25 MG: 0.25 TABLET ORAL at 22:14

## 2018-08-31 RX ADMIN — SODIUM CHLORIDE 100 ML: 900 INJECTION, SOLUTION INTRAVENOUS at 22:23

## 2018-08-31 RX ADMIN — FENTANYL CITRATE 50 MCG: 50 INJECTION, SOLUTION INTRAMUSCULAR; INTRAVENOUS at 23:41

## 2018-08-31 RX ADMIN — SODIUM CHLORIDE 1000 ML/HR: 900 INJECTION, SOLUTION INTRAVENOUS at 21:47

## 2018-08-31 NOTE — IP AVS SNAPSHOT
2700 [facility] [address] [place] [address] 
[phone] Patient: Mikhail Chambers MRN: [id number] RKA:[id number] About your hospitalization You were admitted on:  September 1, 2018 You last received care in the:  [address] [place] [address] You were discharged on:  September 1, 2018 Why you were hospitalized Your primary diagnosis was:  Not on File Your diagnoses also included:  Appendicitis Follow-up Information Follow up With Details Comments Contact Info Rossy Bailey MD In 10 days For wound re-check [address] [place] [address] 
[phone] Phani Escobedo MD   [address] [address] 
[phone] Discharge Orders None A check lara indicates which time of day the medication should be taken. My Medications START taking these medications Instructions Each Dose to Equal  
 Morning Noon Evening Bedtime  
 oxyCODONE-acetaminophen 5-325 mg per tablet Commonly known as:  PERCOCET Your last dose was: Your next dose is: Take 1-2 Tabs by mouth every six (6) hours as needed for Pain. Max Daily Amount: 8 Tabs. 1-2 Tab ASK your doctor about these medications Instructions Each Dose to Equal  
 Morning Noon Evening Bedtime  
 busPIRone 10 mg tablet Commonly known as:  BUSPAR Your last dose was: Your next dose is: Take 10 mg by mouth three (3) times daily. 10 mg  
    
   
   
   
  
 cyanocobalamin (vitamin B-12) 1,000 mcg/mL Kit Your last dose was: Your next dose is:    
   
   
 1 Dose by Injection route every thirty (30) days. 1 Dose  
    
   
   
   
  
 dextroamphetamine-amphetamine 20 mg tablet Commonly known as:  ADDERALL Your last dose was: Your next dose is: Take 20 mg by mouth daily.   
 20 mg  
    
   
   
   
  
 ergocalciferol 50,000 unit capsule Commonly known as:  ERGOCALCIFEROL Your last dose was: Your next dose is: Take 1 Cap by mouth every seven (7) days. Indications: VITAMIN D DEFICIENCY (HIGH DOSE THERAPY) 24953 Units  
    
   
   
   
  
 hyoscyamine SL 0.125 mg SL tablet Commonly known as:  LEVSIN/SL Your last dose was: Your next dose is:    
   
   
 1 Tab by SubLINGual route every four (4) hours as needed for Cramping.  
 0.125 mg LORazepam 0.5 mg tablet Commonly known as:  ATIVAN Your last dose was: Your next dose is: Take 1 Tab by mouth every eight (8) hours as needed for Anxiety. Max Daily Amount: 1.5 mg.  
 0.5 mg  
    
   
   
   
  
 multivitamin tablet Commonly known as:  ONE A DAY Your last dose was: Your next dose is: Take 1 Tab by mouth daily. 1 Tab Omeprazole delayed release 20 mg tablet Commonly known as:  PRILOSEC D/R Your last dose was: Your next dose is: Take 1 Tab by mouth daily. 20 mg Syringe with Needle (Disp) 3 mL 25 gauge x 1\" Syrg Commonly known as:  MONOJECT 3CC SYR 25GX1\" Your last dose was: Your next dose is:    
   
   
 1 Syringe by Does Not Apply route every thirty (30) days. 1 Syringe Syringe with Needle, Safety 3 mL 25 gauge x 1\" Syrg Your last dose was: Your next dose is:    
   
   
 1 Units by Does Not Apply route every thirty (30) days. 1 Units Where to Get Your Medications Information on where to get these meds will be given to you by the nurse or doctor. ! Ask your nurse or doctor about these medications  
  oxyCODONE-acetaminophen 5-325 mg per tablet Opioid Education Prescription Opioids: What You Need to Know: Prescription opioids can be used to help relieve moderate-to-severe pain and are often prescribed following a surgery or injury, or for certain health conditions. These medications can be an important part of treatment but also come with serious risks. Opioids are strong pain medicines. Examples include hydrocodone, oxycodone, fentanyl, and morphine. Heroin is an example of an illegal opioid. It is important to work with your health care provider to make sure you are getting the safest, most effective care. WHAT ARE THE RISKS AND SIDE EFFECTS OF OPIOID USE? Prescription opioids carry serious risks of addiction and overdose, especially with prolonged use. An opioid overdose, often marked by slow breathing, can cause sudden death. The use of prescription opioids can have a number of side effects as well, even when taken as directed. · Tolerance-meaning you might need to take more of a medication for the same pain relief · Physical dependence-meaning you have symptoms of withdrawal when the medication is stopped. Withdrawal symptoms can include nausea, sweating, chills, diarrhea, stomach cramps, and muscle aches. Withdrawal can last up to several weeks, depending on which drug you took and how long you took it. · Increased sensitivity to pain · Constipation · Nausea, vomiting, and dry mouth · Sleepiness and dizziness · Confusion · Depression · Low levels of testosterone that can result in lower sex drive, energy, and strength · Itching and sweating RISKS ARE GREATER WITH:      
· History of drug misuse, substance use disorder, or overdose · Mental health conditions (such as depression or anxiety) · Sleep apnea · Older age (72 years or older) · Pregnancy Avoid alcohol while taking prescription opioids. Also, unless specifically advised by your health care provider, medications to avoid include: · Benzodiazepines (such as Xanax or Valium) · Muscle relaxants (such as Soma or Flexeril) · Hypnotics (such as Ambien or Lunesta) · Other prescription opioids KNOW YOUR OPTIONS Talk to your health care provider about ways to manage your pain that don't involve prescription opioids. Some of these options may actually work better and have fewer risks and side effects. Options may include: 
· Pain relievers such as acetaminophen, ibuprofen, and naproxen · Some medications that are also used for depression or seizures · Physical therapy and exercise · Counseling to help patients learn how to cope better with triggers of pain and stress. · Application of heat or cold compress · Massage therapy · Relaxation techniques Be Informed Make sure you know the name of your medication, how much and how often to take it, and its potential risks & side effects. IF YOU ARE PRESCRIBED OPIOIDS FOR PAIN: 
· Never take opioids in greater amounts or more often than prescribed. Remember the goal is not to be pain-free but to manage your pain at a tolerable level. · Follow up with your primary care provider to: · Work together to create a plan on how to manage your pain. · Talk about ways to help manage your pain that don't involve prescription opioids. · Talk about any and all concerns and side effects. · Help prevent misuse and abuse. · Never sell or share prescription opioids · Help prevent misuse and abuse. · Store prescription opioids in a secure place and out of reach of others (this may include visitors, children, friends, and family). · Safely dispose of unused/unwanted prescription opioids: Find your community drug take-back program or your pharmacy mail-back program, or flush them down the toilet, following guidance from the Food and Drug Administration (www.fda.gov/Drugs/ResourcesForYou). · Visit www.cdc.gov/drugoverdose to learn about the risks of opioid abuse and overdose.  
· If you believe you may be struggling with addiction, tell your health care provider and ask for guidance or call Vilma Swift[employer]TM) by [employer] at 9-770-470-CEJL. Discharge Instructions No heavy lifting more than 10-15 lbs May remove outer dressings in 3 days Leave the steri-strips in place May shower in 2 days No tub baths or swimming. May walk and climb stairs. Appendectomy: What to Expect at [facility] Your Recovery Your doctor removed your appendix either by making many small cuts, called incisions, in your belly (laparoscopic surgery) or through open surgery. In open surgery, the doctor makes one large incision. The incisions leave scars that usually fade over time. After your surgery, it is normal to feel weak and tired for several days after you return home. Your belly may be swollen and may be painful. If you had laparoscopic surgery, you may have pain in your shoulder for about 24 hours. You may also feel sick to your stomach and have diarrhea, constipation, gas, or a headache. This usually goes away in a few days. Your recovery time depends on the type of surgery you had. If you had laparoscopic surgery, you will probably be able to return to work or a normal routine 1 to 3 weeks after surgery. If you had an open surgery, it may take 2 to 4 weeks. If your appendix ruptured, you may have a drain in your incision. Your body will work fine without an appendix. You will not have to make any changes in your diet or lifestyle. This care sheet gives you a general idea about how long it will take for you to recover. But each person recovers at a different pace. Follow the steps below to get better as quickly as possible. How can you care for yourself at home? Activity 
  · Rest when you feel tired. Getting enough sleep will help you recover.  
  · Try to walk each day. Start by walking a little more than you did the day before. Bit by bit, increase the amount you walk.  Walking boosts blood flow and helps prevent pneumonia and constipation.  
  · For about 2 weeks, avoid lifting anything that would make you strain. This may include a child, heavy grocery bags and milk containers, a heavy briefcase or backpack, cat litter or dog food bags, or a vacuum .  
  · Avoid strenuous activities, such as bicycle riding, jogging, weight lifting, or aerobic exercise, until your doctor says it is okay.  
  · You may be able to take showers (unless you have a drain near your incision) 24 to 48 hours after surgery. Pat the incision dry. Do not take a bath for the first 2 weeks, or until your doctor tells you it is okay. If you have a drain near your incision, follow your doctor's instructions.  
  · You may drive when you are no longer taking pain medicine and can quickly move your foot from the gas pedal to the brake. You must also be able to sit comfortably for a long period of time, even if you do not plan on going far. You might get caught in traffic.  
  · You will probably be able to go back to work in 1 to 3 weeks. If you had an open surgery, it may take 3 to 4 weeks.  
  · Your doctor will tell you when you can have sex again. Diet 
  · You can eat your normal diet. If your stomach is upset, try bland, low-fat foods like plain rice, broiled chicken, toast, and yogurt.  
  · Drink plenty of fluids (unless your doctor tells you not to).  
  · You may notice that your bowel movements are not regular right after your surgery. This is common. Try to avoid constipation and straining with bowel movements. You may want to take a fiber supplement every day. If you have not had a bowel movement after a couple of days, ask your doctor about taking a mild laxative. Medicines 
  · Your doctor will tell you if and when you can restart your medicines.  He or she will also give you instructions about taking any new medicines.  
  · If you take blood thinners, such as warfarin (Coumadin), clopidogrel (Plavix), or aspirin, be sure to talk to your doctor. He or she will tell you if and when to start taking those medicines again. Make sure that you understand exactly what your doctor wants you to do.  
  · If your appendix ruptured, you will need to take antibiotics. Take them as directed. Do not stop taking them just because you feel better. You need to take the full course of antibiotics.  
  · Be safe with medicines. Take pain medicines exactly as directed. ¨ If the doctor gave you a prescription medicine for pain, take it as prescribed. ¨ If you are not taking a prescription pain medicine, take an over-the-counter medicine such as acetaminophen (Tylenol), ibuprofen (Advil, Motrin), or naproxen (Aleve). Read and follow all instructions on the label. ¨ Do not take two or more pain medicines at the same time unless the doctor told you to. Many pain medicines have acetaminophen, which is Tylenol. Too much Tylenol can be harmful.  
  · If you think your pain medicine is making you sick to your stomach: 
¨ Take your medicine after meals (unless your doctor has told you not to). ¨ Ask your doctor for a different pain medicine. Incision care 
  · If you had an open surgery, you may have staples in your incision. The doctor will take these out in 7 to 10 days.  
  · If you have strips of tape on the incision, leave the tape on for a week or until it falls off.  
  · You may wash the area with warm, soapy water 24 to 48 hours after your surgery, unless your doctor tells you not to. Pat the area dry.  
  · Keep the area clean and dry. You may cover it with a gauze bandage if it weeps or rubs against clothing. Change the bandage every day.  
  · If your appendix ruptured, you may have an incision with packing in it. Change the packing as often as your doctor tells you to. ¨ Packing changes may hurt at first. Taking pain medicine about half an hour before you change the dressing can help. ¨ If your dressing sticks to your wound, try soaking it with warm water for about 10 minutes before you remove it. You can do this in the shower or by placing a wet washcloth over the dressing. ¨ Remove the old packing and flush the incision with water. Gently pat the top area dry. ¨ The size of the incision determines how much gauze you need to put inside. Fold the gauze over once, but do not wad it up so that it hurts. Put it in the wound carefully. You want to keep the sides of the wound from touching. A cotton swab may help you push the gauze in as needed. ¨ Put a gauze pad over the wound, and tape it down. ¨ You may notice greenish gray fluid seeping from your wound as you start to heal. This is normal. It is a sign that your wound is healing. Follow-up care is a key part of your treatment and safety. Be sure to make and go to all appointments, and call your doctor if you are having problems. It's also a good idea to know your test results and keep a list of the medicines you take. When should you call for help? Call 911 anytime you think you may need emergency care. For example, call if: 
  · You passed out (lost consciousness).  
  · You are short of breath. Devon Amaury your doctor now or seek immediate medical care if: 
  · You are sick to your stomach and cannot drink fluids.  
  · You cannot pass stools or gas.  
  · You have pain that does not get better when you take your pain medicine.  
  · You have signs of infection, such as: 
¨ Increased pain, swelling, warmth, or redness. ¨ Red streaks leading from the wound. ¨ Pus draining from the wound. ¨ A fever.  
  · You have loose stitches, or your incision comes open.  
  · Bright red blood has soaked through the bandage over your incision.  
  · You have signs of a blood clot in your leg (called a deep vein thrombosis), such as: 
¨ Pain in your calf, back of knee, thigh, or groin. ¨ Redness and swelling in your leg or groin.  Watch closely for any changes in your health, and be sure to contact your doctor if you have any problems. Where can you learn more? Go to http://jessee-gonzalo.info/. Enter J041 in the search box to learn more about \"Appendectomy: What to Expect at Home. \" Current as of: May 12, 2017 Content Version: 11.7 © 5322-2135 [employer]. Care instructions adapted under license by [employer] (which disclaims liability or warranty for this information). If you have questions about a medical condition or this instruction, always ask your healthcare professional. [address] any warranty or liability for your use of this information. Learning About Appendectomy What is an appendectomy? An appendectomy is surgery to take out the appendix. This organ is a small sac that is shaped like a finger. It's attached to your large intestine. Appendicitis happens when the appendix becomes infected and inflamed. An appendectomy is the main treatment for it. If surgery is delayed, the inflamed appendix may burst. A burst appendix can cause serious health problems. If your appendix has burst, you may need an emergency surgery to remove the burst appendix. How is this surgery done? Before surgery, you will get medicine to make you sleep. Appendectomy is usually done as a laparoscopic surgery. That means it is done with only small cuts. These cuts are called incisions. The doctor puts a lighted tube, or scope, and other surgical tools through the cuts in your belly. The doctor is able to see your organs with the scope. The doctor removes the appendix. The cuts heal quickly, and the scars usually fade over time. In some cases, the surgery is done through a single larger cut in the belly. This is called open surgery. What can you expect after surgery? Most people leave the hospital 1 to 3 days after surgery. Some even go home the same day. After you go home, it is normal to feel weak and tired for several days. Your belly may be swollen and may be painful. If you had laparoscopic surgery, you may have shoulder pain for about 24 hours. You may also feel sick to your stomach and have diarrhea, constipation, gas, or a headache. This usually goes away in a few days. Your recovery time depends on the type of surgery you had. If you had laparoscopic surgery, you will probably be able to go back to work or your normal routine 1 to 3 weeks after surgery. If you had an open surgery, it may take 2 to 4 weeks. If your appendix burst, you may have a drain in your incision. Your body will work fine without an appendix. You will not have to make any changes in your diet or daily life. After surgery, be sure to follow your doctor's advice about problems to watch for. These may include fever, worse belly pain, or problems with your incision. Follow-up care is a key part of your treatment and safety. Be sure to make and go to all appointments, and call your doctor if you are having problems. It's also a good idea to know your test results and keep a list of the medicines you take. Where can you learn more? Go to http://jessee-gonzalo.info/. Enter C138 in the search box to learn more about \"Learning About Appendectomy. \" Current as of: May 12, 2017 Content Version: 11.7 © 3988-6746 [employer]. Care instructions adapted under license by [employer] (which disclaims liability or warranty for this information). If you have questions about a medical condition or this instruction, always ask your healthcare professional. [address] [address] any warranty or liability for your use of this information. Passpack Announcement We are excited to announce that we are making your provider's discharge notes available to you in Passpack.   You will see these notes when they are completed and signed by the physician that discharged you from your recent hospital stay. If you have any questions or concerns about any information you see in Locu, please call the Health Information Department where you were seen or reach out to your Primary Care Provider for more information about your plan of care. Introducing [facility]! Dear Oskar Tavarez: Thank you for requesting a Locu account. Our records indicate that you already have an active Locu account. You can access your account anytime at [email or web address]. [email or web address] Did you know that you can access your hospital and ER discharge instructions at any time in Locu? You can also review all of your test results from your hospital stay or ER visit. Additional Information If you have questions, please visit the Frequently Asked Questions section of the Locu website at [email or web address]/. Remember, Locu is NOT to be used for urgent needs. For medical emergencies, dial 911. Now available from your iPhone and Android! Introducing Odilon Kelly As a Claribel Mcdaniel patient, I wanted to make you aware of our electronic visit tool called Odilon Whelanfin. Claribel Mcdaniel 24/7 allows you to connect within minutes with a medical provider 24 hours a day, seven days a week via a mobile device or tablet or logging into a secure website from your computer. You can access Odilon Kelly from anywhere in the [place]. A virtual visit might be right for you when you have a simple condition and feel like you just dont want to get out of bed, or cant get away from work for an appointment, when your regular Claribel Mcdaniel provider is not available (evenings, weekends or holidays), or when youre out of town and need minor care.   Electronic visits cost only $49 and if the Odilon Kelly provider determines a prescription is needed to treat your condition, one can be electronically transmitted to a nearby pharmacy*. Please take a moment to enroll today if you have not already done so. The enrollment process is free and takes just a few minutes. To enroll, please download the [employer] 24/7 hemal to your tablet or phone, or visit [email or web address] to enroll on your computer. And, as an [address] [place] [address] patient with a Clickable account, the results of your visits will be scanned into your electronic medical record and your primary care provider will be able to view the scanned results. We urge you to continue to see your regular [employer] provider for your ongoing medical care. And while your primary care provider may not be the one available when you seek a netomat virtual visit, the peace of mind you get from getting a real diagnosis real time can be priceless. For more information on netomat, view our Frequently Asked Questions (FAQs) at [email or web address]. Sincerely, 
 
Ovi Duran MD 
Chief Medical Officer [facility] *:  certain medications cannot be prescribed via netomat Providers Seen During Your Hospitalization Provider Specialty Primary office phone Saqib Neri MD Emergency Medicine [phone] Sunni Marie MD General Surgery [phone] Your Primary Care Physician (PCP) Primary Care Physician Office Phone Office Fax Serge Luis [phone] You are allergic to the following Allergen Reactions Other Food Other (comments) Walnuts and pineapples cause mouth irritation. Recent Documentation Height Weight BMI OB Status Smoking Status 1.753 m 78.5 kg 25.55 kg/m2 Having regular periods Never Smoker Emergency Contacts Name Discharge Info Relation Home [facility] [address] [facility] [3] Spouse [3] [phone] [address] CAREGIVER [3] Mother [14] [phone] Kashif Perez  Spouse [3] [phone] Patient Belongings The following personal items are in your possession at time of discharge: 
     Visual Aid: Glasses, At bedside Please provide this summary of care documentation to your next provider. Signatures-by signing, you are acknowledging that this After Visit Summary has been reviewed with you and you have received a copy. Patient Signature:  ____________________________________________________________ Date:  ____________________________________________________________  
  
[facility] Provider Signature:  ____________________________________________________________ Date:  ____________________________________________________________

## 2018-08-31 NOTE — IP AVS SNAPSHOT
[address] 
[phone] Patient: Roman Padron MRN: [id number] IFV:[id number] A check lara indicates which time of day the medication should be taken. My Medications START taking these medications Instructions Each Dose to Equal  
 Morning Noon Evening Bedtime  
 oxyCODONE-acetaminophen 5-325 mg per tablet Commonly known as:  PERCOCET Your last dose was: Your next dose is: Take 1-2 Tabs by mouth every six (6) hours as needed for Pain. Max Daily Amount: 8 Tabs. 1-2 Tab ASK your doctor about these medications Instructions Each Dose to Equal  
 Morning Noon Evening Bedtime  
 busPIRone 10 mg tablet Commonly known as:  BUSPAR Your last dose was: Your next dose is: Take 10 mg by mouth three (3) times daily. 10 mg  
    
   
   
   
  
 cyanocobalamin (vitamin B-12) 1,000 mcg/mL Kit Your last dose was: Your next dose is:    
   
   
 1 Dose by Injection route every thirty (30) days. 1 Dose  
    
   
   
   
  
 dextroamphetamine-amphetamine 20 mg tablet Commonly known as:  ADDERALL Your last dose was: Your next dose is: Take 20 mg by mouth daily. 20 mg  
    
   
   
   
  
 ergocalciferol 50,000 unit capsule Commonly known as:  ERGOCALCIFEROL Your last dose was: Your next dose is: Take 1 Cap by mouth every seven (7) days. Indications: VITAMIN D DEFICIENCY (HIGH DOSE THERAPY) 24662 Units  
    
   
   
   
  
 hyoscyamine SL 0.125 mg SL tablet Commonly known as:  LEVSIN/SL Your last dose was: Your next dose is:    
   
   
 1 Tab by SubLINGual route every four (4) hours as needed for Cramping.  
 0.125 mg LORazepam 0.5 mg tablet Commonly known as:  ATIVAN Your last dose was: Your next dose is: Take 1 Tab by mouth every eight (8) hours as needed for Anxiety. Max Daily Amount: 1.5 mg.  
 0.5 mg  
    
   
   
   
  
 multivitamin tablet Commonly known as:  ONE A DAY Your last dose was: Your next dose is: Take 1 Tab by mouth daily. 1 Tab Omeprazole delayed release 20 mg tablet Commonly known as:  PRILOSEC D/R Your last dose was: Your next dose is: Take 1 Tab by mouth daily. 20 mg Syringe with Needle (Disp) 3 mL 25 gauge x 1\" Syrg Commonly known as:  MONOJECT 3CC SYR 25GX1\" Your last dose was: Your next dose is:    
   
   
 1 Syringe by Does Not Apply route every thirty (30) days. 1 Syringe Syringe with Needle, Safety 3 mL 25 gauge x 1\" Syrg Your last dose was: Your next dose is:    
   
   
 1 Units by Does Not Apply route every thirty (30) days. 1 Units Where to Get Your Medications Information on where to get these meds will be given to you by the nurse or doctor. ! Ask your nurse or doctor about these medications  
  oxyCODONE-acetaminophen 5-325 mg per tablet

## 2018-09-01 VITALS
WEIGHT: 173 LBS | DIASTOLIC BLOOD PRESSURE: 76 MMHG | BODY MASS INDEX: 25.62 KG/M2 | HEIGHT: 69 IN | HEART RATE: 89 BPM | SYSTOLIC BLOOD PRESSURE: 111 MMHG | RESPIRATION RATE: 15 BRPM | OXYGEN SATURATION: 100 % | TEMPERATURE: 98.2 F

## 2018-09-01 PROBLEM — K37 APPENDICITIS: Status: ACTIVE | Noted: 2018-09-01

## 2018-09-01 PROCEDURE — 88304 TISSUE EXAM BY PATHOLOGIST: CPT | Performed by: SURGERY

## 2018-09-01 PROCEDURE — 74011250637 HC RX REV CODE- 250/637: Performed by: SURGERY

## 2018-09-01 PROCEDURE — 74011000250 HC RX REV CODE- 250: Performed by: SURGERY

## 2018-09-01 PROCEDURE — 99218 HC RM OBSERVATION: CPT

## 2018-09-01 PROCEDURE — 51798 US URINE CAPACITY MEASURE: CPT

## 2018-09-01 PROCEDURE — 74011000258 HC RX REV CODE- 258: Performed by: SURGERY

## 2018-09-01 PROCEDURE — 74011250636 HC RX REV CODE- 250/636: Performed by: ANESTHESIOLOGY

## 2018-09-01 PROCEDURE — 74011250636 HC RX REV CODE- 250/636: Performed by: SURGERY

## 2018-09-01 PROCEDURE — 74011250636 HC RX REV CODE- 250/636

## 2018-09-01 RX ORDER — MIDAZOLAM HYDROCHLORIDE 1 MG/ML
INJECTION, SOLUTION INTRAMUSCULAR; INTRAVENOUS AS NEEDED
Status: DISCONTINUED | OUTPATIENT
Start: 2018-08-31 | End: 2018-09-01 | Stop reason: HOSPADM

## 2018-09-01 RX ORDER — HYDROMORPHONE HYDROCHLORIDE 2 MG/ML
INJECTION, SOLUTION INTRAMUSCULAR; INTRAVENOUS; SUBCUTANEOUS AS NEEDED
Status: DISCONTINUED | OUTPATIENT
Start: 2018-09-01 | End: 2018-09-01 | Stop reason: HOSPADM

## 2018-09-01 RX ORDER — SODIUM CHLORIDE 0.9 % (FLUSH) 0.9 %
5-10 SYRINGE (ML) INJECTION AS NEEDED
Status: DISCONTINUED | OUTPATIENT
Start: 2018-09-01 | End: 2018-09-01 | Stop reason: HOSPADM

## 2018-09-01 RX ORDER — ONDANSETRON 2 MG/ML
INJECTION INTRAMUSCULAR; INTRAVENOUS AS NEEDED
Status: DISCONTINUED | OUTPATIENT
Start: 2018-08-31 | End: 2018-09-01 | Stop reason: HOSPADM

## 2018-09-01 RX ORDER — DIPHENHYDRAMINE HYDROCHLORIDE 50 MG/ML
12.5 INJECTION, SOLUTION INTRAMUSCULAR; INTRAVENOUS
Status: DISCONTINUED | OUTPATIENT
Start: 2018-09-01 | End: 2018-09-01 | Stop reason: HOSPADM

## 2018-09-01 RX ORDER — OXYCODONE AND ACETAMINOPHEN 5; 325 MG/1; MG/1
1 TABLET ORAL AS NEEDED
Status: DISCONTINUED | OUTPATIENT
Start: 2018-09-01 | End: 2018-09-01 | Stop reason: HOSPADM

## 2018-09-01 RX ORDER — NEOSTIGMINE METHYLSULFATE 1 MG/ML
INJECTION INTRAVENOUS AS NEEDED
Status: DISCONTINUED | OUTPATIENT
Start: 2018-09-01 | End: 2018-09-01 | Stop reason: HOSPADM

## 2018-09-01 RX ORDER — FENTANYL CITRATE 50 UG/ML
25 INJECTION, SOLUTION INTRAMUSCULAR; INTRAVENOUS
Status: DISCONTINUED | OUTPATIENT
Start: 2018-09-01 | End: 2018-09-01 | Stop reason: HOSPADM

## 2018-09-01 RX ORDER — CEFAZOLIN SODIUM 1 G/3ML
INJECTION, POWDER, FOR SOLUTION INTRAMUSCULAR; INTRAVENOUS AS NEEDED
Status: DISCONTINUED | OUTPATIENT
Start: 2018-08-31 | End: 2018-09-01 | Stop reason: HOSPADM

## 2018-09-01 RX ORDER — ONDANSETRON 2 MG/ML
4 INJECTION INTRAMUSCULAR; INTRAVENOUS
Status: DISCONTINUED | OUTPATIENT
Start: 2018-09-01 | End: 2018-09-01 | Stop reason: HOSPADM

## 2018-09-01 RX ORDER — ACETAMINOPHEN 325 MG/1
650 TABLET ORAL
Status: DISCONTINUED | OUTPATIENT
Start: 2018-09-01 | End: 2018-09-01 | Stop reason: HOSPADM

## 2018-09-01 RX ORDER — DEXMEDETOMIDINE HYDROCHLORIDE 4 UG/ML
INJECTION, SOLUTION INTRAVENOUS AS NEEDED
Status: DISCONTINUED | OUTPATIENT
Start: 2018-09-01 | End: 2018-09-01 | Stop reason: HOSPADM

## 2018-09-01 RX ORDER — DEXAMETHASONE SODIUM PHOSPHATE 4 MG/ML
INJECTION, SOLUTION INTRA-ARTICULAR; INTRALESIONAL; INTRAMUSCULAR; INTRAVENOUS; SOFT TISSUE AS NEEDED
Status: DISCONTINUED | OUTPATIENT
Start: 2018-08-31 | End: 2018-09-01 | Stop reason: HOSPADM

## 2018-09-01 RX ORDER — OXYCODONE AND ACETAMINOPHEN 5; 325 MG/1; MG/1
1-2 TABLET ORAL
Qty: 20 TAB | Refills: 0 | Status: SHIPPED | OUTPATIENT
Start: 2018-09-01 | End: 2021-06-30 | Stop reason: ALTCHOICE

## 2018-09-01 RX ORDER — ENOXAPARIN SODIUM 100 MG/ML
40 INJECTION SUBCUTANEOUS EVERY 24 HOURS
Status: DISCONTINUED | OUTPATIENT
Start: 2018-09-02 | End: 2018-09-01 | Stop reason: HOSPADM

## 2018-09-01 RX ORDER — ROCURONIUM BROMIDE 10 MG/ML
INJECTION, SOLUTION INTRAVENOUS AS NEEDED
Status: DISCONTINUED | OUTPATIENT
Start: 2018-08-31 | End: 2018-09-01 | Stop reason: HOSPADM

## 2018-09-01 RX ORDER — ONDANSETRON 2 MG/ML
4 INJECTION INTRAMUSCULAR; INTRAVENOUS AS NEEDED
Status: DISCONTINUED | OUTPATIENT
Start: 2018-09-01 | End: 2018-09-01 | Stop reason: HOSPADM

## 2018-09-01 RX ORDER — MIDAZOLAM HYDROCHLORIDE 1 MG/ML
0.5 INJECTION, SOLUTION INTRAMUSCULAR; INTRAVENOUS
Status: DISCONTINUED | OUTPATIENT
Start: 2018-09-01 | End: 2018-09-01 | Stop reason: HOSPADM

## 2018-09-01 RX ORDER — NALOXONE HYDROCHLORIDE 0.4 MG/ML
0.4 INJECTION, SOLUTION INTRAMUSCULAR; INTRAVENOUS; SUBCUTANEOUS AS NEEDED
Status: DISCONTINUED | OUTPATIENT
Start: 2018-09-01 | End: 2018-09-01 | Stop reason: HOSPADM

## 2018-09-01 RX ORDER — DEXTROAMPHETAMINE SACCHARATE, AMPHETAMINE ASPARTATE, DEXTROAMPHETAMINE SULFATE AND AMPHETAMINE SULFATE 2.5; 2.5; 2.5; 2.5 MG/1; MG/1; MG/1; MG/1
20 TABLET ORAL 2 TIMES DAILY
Status: DISCONTINUED | OUTPATIENT
Start: 2018-09-01 | End: 2018-09-01 | Stop reason: HOSPADM

## 2018-09-01 RX ORDER — SODIUM CHLORIDE 0.9 % (FLUSH) 0.9 %
5-10 SYRINGE (ML) INJECTION EVERY 8 HOURS
Status: DISCONTINUED | OUTPATIENT
Start: 2018-09-01 | End: 2018-09-01 | Stop reason: HOSPADM

## 2018-09-01 RX ORDER — GLYCOPYRROLATE 0.2 MG/ML
INJECTION INTRAMUSCULAR; INTRAVENOUS AS NEEDED
Status: DISCONTINUED | OUTPATIENT
Start: 2018-09-01 | End: 2018-09-01 | Stop reason: HOSPADM

## 2018-09-01 RX ORDER — SODIUM CHLORIDE, SODIUM LACTATE, POTASSIUM CHLORIDE, CALCIUM CHLORIDE 600; 310; 30; 20 MG/100ML; MG/100ML; MG/100ML; MG/100ML
125 INJECTION, SOLUTION INTRAVENOUS CONTINUOUS
Status: DISCONTINUED | OUTPATIENT
Start: 2018-09-01 | End: 2018-09-01 | Stop reason: HOSPADM

## 2018-09-01 RX ORDER — OXYCODONE AND ACETAMINOPHEN 5; 325 MG/1; MG/1
1-2 TABLET ORAL
Status: DISCONTINUED | OUTPATIENT
Start: 2018-09-01 | End: 2018-09-01 | Stop reason: HOSPADM

## 2018-09-01 RX ORDER — FENTANYL CITRATE 50 UG/ML
INJECTION, SOLUTION INTRAMUSCULAR; INTRAVENOUS AS NEEDED
Status: DISCONTINUED | OUTPATIENT
Start: 2018-08-31 | End: 2018-09-01 | Stop reason: HOSPADM

## 2018-09-01 RX ORDER — BUPIVACAINE HYDROCHLORIDE AND EPINEPHRINE 2.5; 5 MG/ML; UG/ML
INJECTION, SOLUTION EPIDURAL; INFILTRATION; INTRACAUDAL; PERINEURAL AS NEEDED
Status: DISCONTINUED | OUTPATIENT
Start: 2018-09-01 | End: 2018-09-01 | Stop reason: HOSPADM

## 2018-09-01 RX ORDER — ACETAMINOPHEN 10 MG/ML
INJECTION, SOLUTION INTRAVENOUS AS NEEDED
Status: DISCONTINUED | OUTPATIENT
Start: 2018-09-01 | End: 2018-09-01 | Stop reason: HOSPADM

## 2018-09-01 RX ORDER — LIDOCAINE HYDROCHLORIDE 20 MG/ML
INJECTION, SOLUTION EPIDURAL; INFILTRATION; INTRACAUDAL; PERINEURAL AS NEEDED
Status: DISCONTINUED | OUTPATIENT
Start: 2018-08-31 | End: 2018-09-01 | Stop reason: HOSPADM

## 2018-09-01 RX ORDER — PROPOFOL 10 MG/ML
INJECTION, EMULSION INTRAVENOUS AS NEEDED
Status: DISCONTINUED | OUTPATIENT
Start: 2018-08-31 | End: 2018-09-01 | Stop reason: HOSPADM

## 2018-09-01 RX ORDER — MORPHINE SULFATE 10 MG/ML
2 INJECTION, SOLUTION INTRAMUSCULAR; INTRAVENOUS
Status: DISCONTINUED | OUTPATIENT
Start: 2018-09-01 | End: 2018-09-01 | Stop reason: HOSPADM

## 2018-09-01 RX ORDER — KETAMINE HYDROCHLORIDE 10 MG/ML
INJECTION, SOLUTION INTRAMUSCULAR; INTRAVENOUS AS NEEDED
Status: DISCONTINUED | OUTPATIENT
Start: 2018-09-01 | End: 2018-09-01 | Stop reason: HOSPADM

## 2018-09-01 RX ORDER — DIPHENHYDRAMINE HYDROCHLORIDE 50 MG/ML
12.5 INJECTION, SOLUTION INTRAMUSCULAR; INTRAVENOUS AS NEEDED
Status: DISCONTINUED | OUTPATIENT
Start: 2018-09-01 | End: 2018-09-01 | Stop reason: HOSPADM

## 2018-09-01 RX ORDER — FENTANYL CITRATE 50 UG/ML
25-50 INJECTION, SOLUTION INTRAMUSCULAR; INTRAVENOUS
Status: DISCONTINUED | OUTPATIENT
Start: 2018-09-01 | End: 2018-09-01 | Stop reason: HOSPADM

## 2018-09-01 RX ADMIN — KETAMINE HYDROCHLORIDE 30 MG: 10 INJECTION, SOLUTION INTRAMUSCULAR; INTRAVENOUS at 00:06

## 2018-09-01 RX ADMIN — DEXMEDETOMIDINE HYDROCHLORIDE 4 MCG: 4 INJECTION, SOLUTION INTRAVENOUS at 00:42

## 2018-09-01 RX ADMIN — SODIUM CHLORIDE, POTASSIUM CHLORIDE, SODIUM LACTATE AND CALCIUM CHLORIDE: 600; 310; 30; 20 INJECTION, SOLUTION INTRAVENOUS at 00:41

## 2018-09-01 RX ADMIN — ACETAMINOPHEN 650 MG: 325 TABLET ORAL at 10:42

## 2018-09-01 RX ADMIN — ONDANSETRON 4 MG: 2 INJECTION INTRAMUSCULAR; INTRAVENOUS at 03:00

## 2018-09-01 RX ADMIN — Medication 10 ML: at 03:37

## 2018-09-01 RX ADMIN — GLYCOPYRROLATE 0.4 MG: 0.2 INJECTION INTRAMUSCULAR; INTRAVENOUS at 00:37

## 2018-09-01 RX ADMIN — DEXMEDETOMIDINE HYDROCHLORIDE 4 MCG: 4 INJECTION, SOLUTION INTRAVENOUS at 00:43

## 2018-09-01 RX ADMIN — HYDROMORPHONE HYDROCHLORIDE 1 MG: 2 INJECTION, SOLUTION INTRAMUSCULAR; INTRAVENOUS; SUBCUTANEOUS at 00:34

## 2018-09-01 RX ADMIN — HYDROMORPHONE HYDROCHLORIDE 1 MG: 2 INJECTION, SOLUTION INTRAMUSCULAR; INTRAVENOUS; SUBCUTANEOUS at 00:37

## 2018-09-01 RX ADMIN — DEXMEDETOMIDINE HYDROCHLORIDE 4 MCG: 4 INJECTION, SOLUTION INTRAVENOUS at 00:40

## 2018-09-01 RX ADMIN — SODIUM CHLORIDE, SODIUM LACTATE, POTASSIUM CHLORIDE, AND CALCIUM CHLORIDE 125 ML/HR: 600; 310; 30; 20 INJECTION, SOLUTION INTRAVENOUS at 03:37

## 2018-09-01 RX ADMIN — PIPERACILLIN SODIUM,TAZOBACTAM SODIUM 3.38 G: 3; .375 INJECTION, POWDER, FOR SOLUTION INTRAVENOUS at 07:37

## 2018-09-01 RX ADMIN — NEOSTIGMINE METHYLSULFATE 3 MG: 1 INJECTION INTRAVENOUS at 00:37

## 2018-09-01 RX ADMIN — ACETAMINOPHEN 1000 MG: 10 INJECTION, SOLUTION INTRAVENOUS at 00:00

## 2018-09-01 RX ADMIN — Medication 10 ML: at 15:28

## 2018-09-01 RX ADMIN — SODIUM CHLORIDE, POTASSIUM CHLORIDE, SODIUM LACTATE AND CALCIUM CHLORIDE 125 ML/HR: 600; 310; 30; 20 INJECTION, SOLUTION INTRAVENOUS at 02:00

## 2018-09-01 RX ADMIN — BENZOCAINE AND MENTHOL 1 LOZENGE: 15; 3.6 LOZENGE ORAL at 09:02

## 2018-09-01 RX ADMIN — DEXMEDETOMIDINE HYDROCHLORIDE 4 MCG: 4 INJECTION, SOLUTION INTRAVENOUS at 00:41

## 2018-09-01 RX ADMIN — DEXMEDETOMIDINE HYDROCHLORIDE 4 MCG: 4 INJECTION, SOLUTION INTRAVENOUS at 00:44

## 2018-09-01 NOTE — H&P
Surgery History and Physical 
 
Subjective:  
  
Gema Guerrero is a 45 y.o. female who began having abdominal pain about 5 hours ago. This started after she had a Bm. She has never had pain like this before. The pain moved to her back. She tried taking tums but this did not help. No nausea or vomiting. No Diarrhea. The pain moved to her RLQ. Once she got to the hospital she started feeling febrile. Patient Active Problem List  
 Diagnosis Date Noted  Morbid obesity ([facility] 05/25/2017  Mild intermittent asthma without complication 45/57/0971  PCOS (polycystic ovarian syndrome) 03/10/2017  Palpitations 01/30/2017  Morbid obesity with BMI of 40.0-44.9, adult ([facility] 09/27/2016 Past Medical History:  
Diagnosis Date  Anxiety  Asthma ATHLETIC INDUCED ASTHMA  Depression  Morbid obesity ([facility]  PCOS (polycystic ovarian syndrome) Past Surgical History:  
Procedure Laterality Date  HX GASTRECTOMY  05/25/2017  
 laparoscopic sleeve gastrectomy  HX HEENT  1985  
 tubes in ears as child  HX HEENT  1998  
 wisdom teeth removed  HX OTHER SURGICAL Bilateral 2011 Liposuction Social History Substance Use Topics  Smoking status: Never Smoker  Smokeless tobacco: Never Used  Alcohol use Yes Comment: 1-2 drinks per month Family History Problem Relation Age of Onset  Hypertension Mother  Ulcerative Colitis Mother  Heart Disease Father  Hypertension Father  Heart Surgery Father AORTIC VALVE REPLACED AND ABLATION, HISTORY OF AFIB  Cancer Maternal Grandmother BREAST CANCER  
 Heart Disease Paternal Grandfather  Anesth Problems Neg Hx Prior to Admission medications Medication Sig Start Date End Date Taking? Authorizing Provider Syringe with Needle, Disp, (MONOJECT 3CC SYR 25GX1\") 3 mL 25 gauge x 1\" syrg 1 Syringe by Does Not Apply route every thirty (30) days.  2/1/18   Lacretia Campbell, NP  
 ergocalciferol (ERGOCALCIFEROL) 50,000 unit capsule Take 1 Cap by mouth every seven (7) days. Indications: VITAMIN D DEFICIENCY (HIGH DOSE THERAPY) 10/3/17   Susa Habermann, NP  
cyanocobalamin, vitamin B-12, 1,000 mcg/mL kit 1 Dose by Injection route every thirty (30) days. 7/6/17   Susa Habermann, NP Syringe with Needle, Safety 3 mL 25 gauge x 1\" syrg 1 Units by Does Not Apply route every thirty (30) days. 7/6/17   Susa Habermann, NP  
LORazepam (ATIVAN) 0.5 mg tablet Take 1 Tab by mouth every eight (8) hours as needed for Anxiety. Max Daily Amount: 1.5 mg. 5/26/17   OBED Cesar  
dextroamphetamine-amphetamine (ADDERALL) 20 mg tablet Take 20 mg by mouth daily. Historical Provider  
busPIRone (BUSPAR) 10 mg tablet Take 10 mg by mouth three (3) times daily. Historical Provider  
hyoscyamine SL (LEVSIN/SL) 0.125 mg SL tablet 1 Tab by SubLINGual route every four (4) hours as needed for Cramping. 5/9/17   Susa Habermann, NP Omeprazole delayed release (PRILOSEC D/R) 20 mg tablet Take 1 Tab by mouth daily. 5/9/17   Susa Habermann, NP  
multivitamin (ONE A DAY) tablet Take 1 Tab by mouth daily. Historical Provider Allergies Allergen Reactions  Other Food Other (comments) Walnuts and pineapples cause mouth irritation. Review of Systems:   
Constitutional: positive for fevers Eyes: negative Ears, Nose, Mouth, Throat, and Face: negative Respiratory: negative Cardiovascular: negative Gastrointestinal: positive for abdominal pain Genitourinary:negative Integument/Breast: negative Hematologic/Lymphatic: negative Musculoskeletal:negative Neurological: negative Behavioral/Psychiatric: positive for ADHD and anxiety Endocrine: negative Allergic/Immunologic: negative Objective:  
 
Visit Vitals  /73 (BP 1 Location: Left arm, BP Patient Position: Sitting)  Pulse (!) 132  Temp 98.1 °F (36.7 °C)  Resp 18  Ht 5' 9\" (1.753 m)  Wt 173 lb (78.5 kg)  LMP 08/10/2018 (Approximate)  SpO2 100%  BMI 25.55 kg/m2 Physical Exam:   
GENERAL: alert, cooperative, no distress, appears stated age, EYE: negative, THROAT & NECK: normal, LUNG: clear to auscultation bilaterally, HEART: regular rate and rhythm, ABDOMEN: soft with tenderness in the RLQ. + rosvigs sign , EXTREMITIES:  extremities normal, atraumatic, no cyanosis or edema, SKIN: Normal., NEUROLOGIC: negative, PSYCH: depressed, tearful Imaging:  images and reports reviewed CT- Mild or early, acute appendicitis Lab Review:   
Recent Results (from the past 24 hour(s)) CBC WITH AUTOMATED DIFF Collection Time: 08/31/18  9:22 PM  
Result Value Ref Range WBC 15.8 (H) 3.6 - 11.0 K/uL  
 RBC 3.83 3.80 - 5.20 M/uL  
 HGB 11.9 11.5 - 16.0 g/dL HCT 34.6 (L) 35.0 - 47.0 % MCV 90.3 80.0 - 99.0 FL  
 MCH 31.1 26.0 - 34.0 PG  
 MCHC 34.4 30.0 - 36.5 g/dL  
 RDW 12.2 11.5 - 14.5 % PLATELET 600 774 - 673 K/uL MPV 10.0 8.9 - 12.9 FL  
 NRBC 0.0 0  WBC ABSOLUTE NRBC 0.00 0.00 - 0.01 K/uL NEUTROPHILS 79 (H) 32 - 75 % LYMPHOCYTES 14 12 - 49 % MONOCYTES 6 5 - 13 % EOSINOPHILS 0 0 - 7 % BASOPHILS 0 0 - 1 % IMMATURE GRANULOCYTES 0 0.0 - 0.5 % ABS. NEUTROPHILS 12.5 (H) 1.8 - 8.0 K/UL  
 ABS. LYMPHOCYTES 2.2 0.8 - 3.5 K/UL  
 ABS. MONOCYTES 1.0 0.0 - 1.0 K/UL  
 ABS. EOSINOPHILS 0.0 0.0 - 0.4 K/UL  
 ABS. BASOPHILS 0.0 0.0 - 0.1 K/UL  
 ABS. IMM. GRANS. 0.1 (H) 0.00 - 0.04 K/UL  
 DF AUTOMATED METABOLIC PANEL, COMPREHENSIVE Collection Time: 08/31/18  9:22 PM  
Result Value Ref Range Sodium 141 136 - 145 mmol/L Potassium 3.2 (L) 3.5 - 5.1 mmol/L Chloride 107 97 - 108 mmol/L  
 CO2 25 21 - 32 mmol/L Anion gap 9 5 - 15 mmol/L Glucose 87 65 - 100 mg/dL BUN 18 6 - 20 MG/DL Creatinine 0.60 0.55 - 1.02 MG/DL  
 BUN/Creatinine ratio 30 (H) 12 - 20 GFR est AA >60 >60 ml/min/1.73m2 GFR est non-AA >60 >60 ml/min/1.73m2 Calcium 8.7 8.5 - 10.1 MG/DL Bilirubin, total 0.5 0.2 - 1.0 MG/DL  
 ALT (SGPT) 16 12 - 78 U/L  
 AST (SGOT) 12 (L) 15 - 37 U/L Alk. phosphatase 44 (L) 45 - 117 U/L Protein, total 7.7 6.4 - 8.2 g/dL Albumin 4.6 3.5 - 5.0 g/dL Globulin 3.1 2.0 - 4.0 g/dL A-G Ratio 1.5 1.1 - 2.2 LIPASE Collection Time: 08/31/18  9:22 PM  
Result Value Ref Range Lipase 78 73 - 393 U/L  
SAMPLES BEING HELD Collection Time: 08/31/18  9:22 PM  
Result Value Ref Range SAMPLES BEING HELD RED,STEPHANIE   
 COMMENT Add-on orders for these samples will be processed based on acceptable specimen integrity and analyte stability, which may vary by analyte. URINALYSIS W/MICROSCOPIC Collection Time: 08/31/18  9:43 PM  
Result Value Ref Range Color YELLOW/STRAW Appearance CLEAR CLEAR Specific gravity 1.027 1.003 - 1.030    
 pH (UA) 5.5 5.0 - 8.0 Protein NEGATIVE  NEG mg/dL Glucose NEGATIVE  NEG mg/dL Ketone 40 (A) NEG mg/dL Bilirubin NEGATIVE  NEG Blood NEGATIVE  NEG Urobilinogen 0.2 0.2 - 1.0 EU/dL Nitrites NEGATIVE  NEG Leukocyte Esterase NEGATIVE  NEG    
 WBC 0-4 0 - 4 /hpf  
 RBC 5-10 0 - 5 /hpf Epithelial cells FEW FEW /lpf Bacteria NEGATIVE  NEG /hpf Mucus 1+ (A) NEG /lpf URINE CULTURE HOLD SAMPLE Collection Time: 08/31/18  9:43 PM  
Result Value Ref Range Urine culture hold URINE ON HOLD IN MICROBIOLOGY DEPT FOR 3 DAYS. IF UNPRESERVED URINE IS SUBMITTED, IT CANNOT BE USED FOR ADDITIONAL TESTING AFTER 24 HRS, RECOLLECTION WILL BE REQUIRED. HCG URINE, QL. - POC Collection Time: 08/31/18  9:46 PM  
Result Value Ref Range Pregnancy test,urine (POC) NEGATIVE  NEG Assessment:  
Acute Appendicitis Plan: 1. I recommend proceeding with Surgery:  Appendectomy and Laparoscopy.   
 
2. Discussed aspects of surgical intervention, methods, risks (including by not limited to infection, bleeding, hematoma, and perforation of the intestines or solid organs), and the risks of general anesthetic. The patient understands the risks; any and all questions were answered to the patient's satisfaction. Signed By: Sushil Sethi MD   
 August 31, 2018

## 2018-09-01 NOTE — DISCHARGE INSTRUCTIONS
No heavy lifting more than 10-15 lbs  May remove outer dressings in 3 days  Leave the steri-strips in place  May shower in 2 days  No tub baths or swimming. May walk and climb stairs. Appendectomy: What to Expect at Home  Your Recovery    Your doctor removed your appendix either by making many small cuts, called incisions, in your belly (laparoscopic surgery) or through open surgery. In open surgery, the doctor makes one large incision. The incisions leave scars that usually fade over time. After your surgery, it is normal to feel weak and tired for several days after you return home. Your belly may be swollen and may be painful. If you had laparoscopic surgery, you may have pain in your shoulder for about 24 hours. You may also feel sick to your stomach and have diarrhea, constipation, gas, or a headache. This usually goes away in a few days. Your recovery time depends on the type of surgery you had. If you had laparoscopic surgery, you will probably be able to return to work or a normal routine 1 to 3 weeks after surgery. If you had an open surgery, it may take 2 to 4 weeks. If your appendix ruptured, you may have a drain in your incision. Your body will work fine without an appendix. You will not have to make any changes in your diet or lifestyle. This care sheet gives you a general idea about how long it will take for you to recover. But each person recovers at a different pace. Follow the steps below to get better as quickly as possible. How can you care for yourself at home? Activity    · Rest when you feel tired. Getting enough sleep will help you recover.     · Try to walk each day. Start by walking a little more than you did the day before. Bit by bit, increase the amount you walk. Walking boosts blood flow and helps prevent pneumonia and constipation.     · For about 2 weeks, avoid lifting anything that would make you strain.  This may include a child, heavy grocery bags and milk containers, a heavy briefcase or backpack, cat litter or dog food bags, or a vacuum .     · Avoid strenuous activities, such as bicycle riding, jogging, weight lifting, or aerobic exercise, until your doctor says it is okay.     · You may be able to take showers (unless you have a drain near your incision) 24 to 48 hours after surgery. Pat the incision dry. Do not take a bath for the first 2 weeks, or until your doctor tells you it is okay. If you have a drain near your incision, follow your doctor's instructions.     · You may drive when you are no longer taking pain medicine and can quickly move your foot from the gas pedal to the brake. You must also be able to sit comfortably for a long period of time, even if you do not plan on going far. You might get caught in traffic.     · You will probably be able to go back to work in 1 to 3 weeks. If you had an open surgery, it may take 3 to 4 weeks.     · Your doctor will tell you when you can have sex again. Diet    · You can eat your normal diet. If your stomach is upset, try bland, low-fat foods like plain rice, broiled chicken, toast, and yogurt.     · Drink plenty of fluids (unless your doctor tells you not to).     · You may notice that your bowel movements are not regular right after your surgery. This is common. Try to avoid constipation and straining with bowel movements. You may want to take a fiber supplement every day. If you have not had a bowel movement after a couple of days, ask your doctor about taking a mild laxative. Medicines    · Your doctor will tell you if and when you can restart your medicines. He or she will also give you instructions about taking any new medicines.     · If you take blood thinners, such as warfarin (Coumadin), clopidogrel (Plavix), or aspirin, be sure to talk to your doctor. He or she will tell you if and when to start taking those medicines again.  Make sure that you understand exactly what your doctor wants you to do.     · If your appendix ruptured, you will need to take antibiotics. Take them as directed. Do not stop taking them just because you feel better. You need to take the full course of antibiotics.     · Be safe with medicines. Take pain medicines exactly as directed. ¨ If the doctor gave you a prescription medicine for pain, take it as prescribed. ¨ If you are not taking a prescription pain medicine, take an over-the-counter medicine such as acetaminophen (Tylenol), ibuprofen (Advil, Motrin), or naproxen (Aleve). Read and follow all instructions on the label. ¨ Do not take two or more pain medicines at the same time unless the doctor told you to. Many pain medicines have acetaminophen, which is Tylenol. Too much Tylenol can be harmful.     · If you think your pain medicine is making you sick to your stomach:  ¨ Take your medicine after meals (unless your doctor has told you not to). ¨ Ask your doctor for a different pain medicine. Incision care    · If you had an open surgery, you may have staples in your incision. The doctor will take these out in 7 to 10 days.     · If you have strips of tape on the incision, leave the tape on for a week or until it falls off.     · You may wash the area with warm, soapy water 24 to 48 hours after your surgery, unless your doctor tells you not to. Pat the area dry.     · Keep the area clean and dry. You may cover it with a gauze bandage if it weeps or rubs against clothing. Change the bandage every day.     · If your appendix ruptured, you may have an incision with packing in it. Change the packing as often as your doctor tells you to. ¨ Packing changes may hurt at first. Taking pain medicine about half an hour before you change the dressing can help. ¨ If your dressing sticks to your wound, try soaking it with warm water for about 10 minutes before you remove it. You can do this in the shower or by placing a wet washcloth over the dressing.   ¨ Remove the old packing and flush the incision with water. Gently pat the top area dry. ¨ The size of the incision determines how much gauze you need to put inside. Fold the gauze over once, but do not wad it up so that it hurts. Put it in the wound carefully. You want to keep the sides of the wound from touching. A cotton swab may help you push the gauze in as needed. ¨ Put a gauze pad over the wound, and tape it down. ¨ You may notice greenish gray fluid seeping from your wound as you start to heal. This is normal. It is a sign that your wound is healing. Follow-up care is a key part of your treatment and safety. Be sure to make and go to all appointments, and call your doctor if you are having problems. It's also a good idea to know your test results and keep a list of the medicines you take. When should you call for help? Call 911 anytime you think you may need emergency care. For example, call if:    · You passed out (lost consciousness).     · You are short of breath. Yoko Bajwa your doctor now or seek immediate medical care if:    · You are sick to your stomach and cannot drink fluids.     · You cannot pass stools or gas.     · You have pain that does not get better when you take your pain medicine.     · You have signs of infection, such as:  ¨ Increased pain, swelling, warmth, or redness. ¨ Red streaks leading from the wound. ¨ Pus draining from the wound. ¨ A fever.     · You have loose stitches, or your incision comes open.     · Bright red blood has soaked through the bandage over your incision.     · You have signs of a blood clot in your leg (called a deep vein thrombosis), such as:  ¨ Pain in your calf, back of knee, thigh, or groin. ¨ Redness and swelling in your leg or groin.    Watch closely for any changes in your health, and be sure to contact your doctor if you have any problems. Where can you learn more? Go to http://jessee-gonzalo.info/.   Enter Z959 in the search box to learn more about \"Appendectomy: What to Expect at Home. \"  Current as of: May 12, 2017  Content Version: 11.7  © 9775-3237 [employer]. Care instructions adapted under license by [employer] (which disclaims liability or warranty for this information). If you have questions about a medical condition or this instruction, always ask your healthcare professional. [address] any warranty or liability for your use of this information. Learning About Appendectomy  What is an appendectomy? An appendectomy is surgery to take out the appendix. This organ is a small sac that is shaped like a finger. It's attached to your large intestine. Appendicitis happens when the appendix becomes infected and inflamed. An appendectomy is the main treatment for it. If surgery is delayed, the inflamed appendix may burst. A burst appendix can cause serious health problems. If your appendix has burst, you may need an emergency surgery to remove the burst appendix. How is this surgery done? Before surgery, you will get medicine to make you sleep. Appendectomy is usually done as a laparoscopic surgery. That means it is done with only small cuts. These cuts are called incisions. The doctor puts a lighted tube, or scope, and other surgical tools through the cuts in your belly. The doctor is able to see your organs with the scope. The doctor removes the appendix. The cuts heal quickly, and the scars usually fade over time. In some cases, the surgery is done through a single larger cut in the belly. This is called open surgery. What can you expect after surgery? Most people leave the hospital 1 to 3 days after surgery. Some even go home the same day. After you go home, it is normal to feel weak and tired for several days. Your belly may be swollen and may be painful. If you had laparoscopic surgery, you may have shoulder pain for about 24 hours.   You may also feel sick to your stomach and have diarrhea, constipation, gas, or a headache. This usually goes away in a few days. Your recovery time depends on the type of surgery you had. If you had laparoscopic surgery, you will probably be able to go back to work or your normal routine 1 to 3 weeks after surgery. If you had an open surgery, it may take 2 to 4 weeks. If your appendix burst, you may have a drain in your incision. Your body will work fine without an appendix. You will not have to make any changes in your diet or daily life. After surgery, be sure to follow your doctor's advice about problems to watch for. These may include fever, worse belly pain, or problems with your incision. Follow-up care is a key part of your treatment and safety. Be sure to make and go to all appointments, and call your doctor if you are having problems. It's also a good idea to know your test results and keep a list of the medicines you take. Where can you learn more? Go to http://jessee-gonzalo.info/. Enter W618 in the search box to learn more about \"Learning About Appendectomy. \"  Current as of: May 12, 2017  Content Version: 11.7  © 9909-3159 [employer]. Care instructions adapted under license by [employer] (which disclaims liability or warranty for this information). If you have questions about a medical condition or this instruction, always ask your healthcare professional. [address] any warranty or liability for your use of this information.

## 2018-09-01 NOTE — ED TRIAGE NOTES
Patient arrives to the ED with c/o abdo and RIGHT flank pain which started this evening after a BM, no nausea, vomiting or diarrhea noted, no changes in urinary habits, patient describes RIGHT flank pain as sharp. No fever noted.

## 2018-09-01 NOTE — PROGRESS NOTES
Problem: Falls - Risk of 
Goal: *Absence of Falls Document Bishop Gardner Fall Risk and appropriate interventions in the flowsheet. Outcome: Progressing Towards Goal 
Fall Risk Interventions: 
Mobility Interventions: Patient to call before getting OOB Medication Interventions: Teach patient to arise slowly, Patient to call before getting OOB

## 2018-09-01 NOTE — PROGRESS NOTES
Primary Nurse Gardenia Rodriguez and ALICIA Wgigins performed a dual skin assessment on this patient No impairment noted Antwon score is 21

## 2018-09-01 NOTE — PROGRESS NOTES
Primary Nurse Katty Logan RN and Pili Hammonds RN performed a dual skin assessment on this patient No impairment noted Antwon score is 21

## 2018-09-01 NOTE — ED PROVIDER NOTES
HPI Comments: 45 y.o. female with past medical history significant for gastric sleeve, asthma, PCOS, anxiety who presents from home driven by self with chief complaint of abdominal pain. Pt reports gradually worsening mid abdominal pain since 1800 this evening. Pt states that the pain followed having a bowel movement. The bowel movement was normal consistency and color. She notes the pain became severe and most significant to right mid abdomen accompanied by right sided back pain. She took Tums without relief. The pain is exacerbated with movement. She denies any accompanying symptoms, in particular, nausea, vomiting, diarrhea, constipation, blood in stool, hematuria, dysuria, difficulty urinating. No known fever, chills, chest pain, shortness of breath. No vaginal pain, bleeding or discharge. Last meal was at 1230 this afternoon. Pt notes hx of gastric sleeve x 1 year ago (May 2017). She has had an uncomplicated post-op course other than occasional constipation. She has had a stressful day and endorses some anxiety. There are no other acute medical concerns at this time. PCP: Rivas Alvarez MD 
 
Note written by Thomas Walters, as dictated by OBED Barrientos 9:37 PM 
 
The history is provided by the patient. No  was used. Past Medical History:  
Diagnosis Date  Anxiety  Asthma ATHLETIC INDUCED ASTHMA  Depression  Morbid obesity ([address]  PCOS (polycystic ovarian syndrome) Past Surgical History:  
Procedure Laterality Date  HX GASTRECTOMY  05/25/2017  
 laparoscopic sleeve gastrectomy  HX HEENT  1985  
 tubes in ears as child  HX HEENT  1998  
 wisdom teeth removed  HX OTHER SURGICAL Bilateral 2011 Liposuction Family History:  
Problem Relation Age of Onset  Hypertension Mother  Ulcerative Colitis Mother  Heart Disease Father  Hypertension Father  Heart Surgery Father AORTIC VALVE REPLACED AND ABLATION, HISTORY OF AFIB  Cancer Maternal Grandmother BREAST CANCER  
 Heart Disease Paternal Grandfather  Anesth Problems Neg Hx Social History Social History  Marital status:  Spouse name: N/A  
 Number of children: N/A  
 Years of education: N/A Occupational History  Not on file. Social History Main Topics  Smoking status: Never Smoker  Smokeless tobacco: Never Used  Alcohol use Yes Comment: 1-2 drinks per month  Drug use: No  
 Sexual activity: Yes Birth control/ protection: None Other Topics Concern  Not on file Social History Narrative Contract work; late nights Moved from [place] for step-son (age 6) In the home with spouse and step son (70% of the time) ALLERGIES: Other food Review of Systems Constitutional: Negative for chills and fever. Respiratory: Negative for shortness of breath. Cardiovascular: Negative for chest pain. Gastrointestinal: Positive for abdominal pain. Negative for blood in stool, constipation, diarrhea, nausea and vomiting. Genitourinary: Negative for dysuria, hematuria, vaginal bleeding and vaginal discharge. Musculoskeletal: Positive for back pain. All other systems reviewed and are negative. Vitals:  
 08/31/18 2109 BP: 120/73 Pulse: (!) 132 Resp: 18 Temp: 98.1 °F (36.7 °C) SpO2: 100% Weight: 78.5 kg (173 lb) Height: 5' 9\" (1.753 m) Physical Exam  
Constitutional: She is oriented to person, place, and time. She appears well-developed and well-nourished. No distress. Anxious appearing  female in NAD HENT:  
Head: Normocephalic and atraumatic. Right Ear: External ear normal.  
Left Ear: External ear normal.  
Nose: Nose normal.  
Mouth/Throat: Oropharynx is clear and moist. No oropharyngeal exudate.   
Eyes: Conjunctivae and EOM are normal. Pupils are equal, round, and reactive to light. Right eye exhibits no discharge. Left eye exhibits no discharge. No scleral icterus. Neck: Normal range of motion. Neck supple. Cardiovascular: Regular rhythm. Exam reveals no gallop and no friction rub. No murmur heard. tachycardic Pulmonary/Chest: Effort normal and breath sounds normal. She has no wheezes. She has no rales. Abdominal: Soft. Bowel sounds are normal. She exhibits no distension. There is tenderness (RLQ). There is no rebound and no guarding. Neurological: She is alert and oriented to person, place, and time. No cranial nerve deficit. Coordination normal.  
Skin: Skin is warm and dry. She is not diaphoretic. Psychiatric: She has a normal mood and affect. Her behavior is normal.  
Nursing note and vitals reviewed. MDM Number of Diagnoses or Management Options Acute appendicitis, unspecified acute appendicitis type:  
Diagnosis management comments: 44 yo  female with hx of bariatric surgery presenting with lower abdominal pain with sig tenderness in RLQ on exam.  Concern for Appy vs SBO vs ovarian cyst vs UTI amongst others Plan CBC 
CMP 
UA Ct abd/pel Analgesia IVf Reassess. Estelita Stern Amount and/or Complexity of Data Reviewed Clinical lab tests: ordered and reviewed Tests in the radiology section of CPT®: ordered and reviewed Independent visualization of images, tracings, or specimens: yes ED Course Procedures Progress note Labs and imaging reviewed. WbC 15k, appy on CT. Estelita Stern Conferred with Dr. Analisa Ferguson, gen surgery discussed HPI, PE findings, labs and imaging. He will evaluate patient.  Estelita Stern

## 2018-09-01 NOTE — PROGRESS NOTES
TRANSFER - IN REPORT: 
 
Verbal report received from Damir RN(name) on Roman Favorite  being received from PACU(unit) for routine post - op Report consisted of patients Situation, Background, Assessment and  
Recommendations(SBAR). Information from the following report(s) SBAR, Kardex, ED Summary, Procedure Summary and MAR was reviewed with the receiving nurse. Opportunity for questions and clarification was provided. Assessment completed upon patients arrival to unit and care assumed.

## 2018-09-01 NOTE — BRIEF OP NOTE
BRIEF OPERATIVE NOTE Date of Procedure: 8/31/2018 Preoperative Diagnosis: Acute APPENDICITIS Postoperative Diagnosis: Acute appendicitis Procedure(s): 
APPENDECTOMY LAPAROSCOPIC Surgeon(s) and Role: Emmanuel Jimenez MD - Primary Surgical Assistant: Naomie Lemus Surgical Staff: 
Circ-1: Allan Michael RN Scrub RN-1: Corrina Ramsey RN Surg Asst-1: Aki Vital Event Time In Incision Start 0007 Incision Close Anesthesia: General  
Estimated Blood Loss:5 Specimens:  
ID Type Source Tests Collected by Time Destination 1 : appendix Fresh Appendix  Lorie Arboleda MD 9/1/2018 0015 Pathology Findings: acute appendicitis Complications: none Implants: * No implants in log *

## 2018-09-01 NOTE — OP NOTES
[facility] REPORT    Deja Bueno  MR#: [id number]  : 1980  ACCOUNT #: [de-identified]   DATE OF SERVICE: 2018    SURGEON:  Devin Taylor MD    ASSISTANT: Sondar     ANESTHESIA:  General.    PREOPERATIVE DIAGNOSIS:  Acute appendicitis. POSTOPERATIVE DIAGNOSIS:  Acute appendicitis. PROCEDURE PERFORMED:  Laparoscopic appendectomy. ESTIMATED BLOOD LOSS:  5    SPECIMENS REMOVED:  appendix    COMPLICATIONS:  None     IMPLANTS:  None     CLINICAL INDICATIONS:  The patient is a 70-year-old female who presented to the hospital complaining of abdominal pain. Workup revealed acute appendicitis. She comes to the operating room today for laparoscopic appendectomy. Informed consent was obtained. The patient was brought to the operating room and placed on the operating table in supine position. General endotracheal anesthesia was then established. She was then prepped and draped in the usual sterile fashion. A 5 mm infraumbilical incision was then made. Veress needle was placed within the abdomen and saline drop test was performed. Once we were assured we were within the abdomen; the abdomen was insufflated to 15 mmHg. The Veress needle was removed and a 5 mm Optiview trocar was placed. Additional 5 mm and one 12 mm trocar were then placed under direct vision. There was some murky fluid within the pelvis. The cecum was down in the pelvis; it was easily mobilized and the appendix was found to be markedly inflamed and retrocecal.  The peritoneum overlying the appendix was somewhat loose. This was first opened with a nick using a Sonicision. We then bluntly dissected the mesentery of the appendix and the loose attachments using the Sonicision. Once this was freed up and we had come down onto the base of the appendix; the appendix was amputated using the Endo-JERI stapler. Additional attachments were taken using the Sonicision.   The appendix was placed into an Endobag and removed via the 12 mm trocar site. The abdomen was irrigated. No further bleeding was identified. Of note, the patient did have fibroids of her uterus. The 12 mm trocar was then removed and the fascia was closed using 0 Vicryl suture and the endofascial closure device. The abdomen was desufflated. The other trocars were removed. Local anesthetic was injected and the skin was then closed using 4-0 Monocryl in a subcuticular fashion. Mastisol, Steri-Strips, 2 x 2s, and Tegaderms were applied. Patient was awoken in the OR and taken to PACU in stable condition.       MD JENNYFER Clifton / GLENDA  D: 09/01/2018 01:00     T: 09/01/2018 01:52  JOB #: [id number]

## 2018-09-01 NOTE — PROGRESS NOTES
Removed IV with tip intact. Script for percocet given. Reviewed discharge instructions and follow up information. Opportunity for questions given. Patient discharged for home in care of her spouse with all belongings.

## 2018-09-01 NOTE — PROGRESS NOTES
Progress Note Patient: Su Maza MRN: [id number]  SSN: [id number] YOB: 1980  Age: 45 y.o. Sex: female Admit Date: 2018 1 Day Post-Op Procedure:  Procedure(s): 
APPENDECTOMY LAPAROSCOPIC Subjective:  
 
Patient complains of some pain. She is not taking narcotic pain medication. Tolerating clears without nausea. Objective:  
 
Visit Vitals  /67 (BP 1 Location: Left arm, BP Patient Position: At rest;Supine)  Pulse 63  Temp 97.9 °F (36.6 °C)  Resp 14  
 Ht 5' 9\" (1.753 m)  Wt 173 lb (78.5 kg)  SpO2 100%  BMI 25.55 kg/m2 Temp (24hrs), Av.1 °F (36.7 °C), Min:97.9 °F (36.6 °C), Max:98.3 °F (36.8 °C) Physical Exam:   
Gen -Alert in NAD Lungs- CTA H-RRR Abd- soft and appropriately tender. Dressings intact Data Review: images and reports reviewed Lab Review: All lab results for the last 24 hours reviewed. No results found for this or any previous visit (from the past 12 hour(s)). Assessment:  
 
Hospital Problems  Date Reviewed: 2018 Codes Class Noted POA Appendicitis ICD-10-CM: K37 ICD-9-CM: 754  2018 Unknown Plan/Recommendations/Medical Decision Making: S/p Lap appendectomy Doing well I suspect that much of her increased pain is because she  Does not want to take narcotic pain medication Continue Diet. Ambulate Discharge home. Signed By: Lesli Price MD   
 2018

## 2018-09-01 NOTE — ANESTHESIA PREPROCEDURE EVALUATION
Anesthetic History No history of anesthetic complications Review of Systems / Medical History Patient summary reviewed, nursing notes reviewed and pertinent labs reviewed Pulmonary Asthma Neuro/Psych Psychiatric history Cardiovascular Within defined limits GI/Hepatic/Renal 
Within defined limits Endo/Other Other Findings Physical Exam 
 
Airway Mallampati: I 
TM Distance: 4 - 6 cm Neck ROM: normal range of motion Mouth opening: Normal 
 
 Cardiovascular Regular rate and rhythm,  S1 and S2 normal,  no murmur, click, rub, or gallop Dental 
No notable dental hx Pulmonary Breath sounds clear to auscultation Abdominal 
GI exam deferred Other Findings Anesthetic Plan ASA: 2 Anesthesia type: general 
 
 
 
 
Induction: Intravenous Anesthetic plan and risks discussed with: Patient

## 2018-09-04 ENCOUNTER — TELEPHONE (OUTPATIENT)
Dept: SURGERY | Age: 38
End: 2018-09-04

## 2018-09-04 NOTE — TELEPHONE ENCOUNTER
I called the patient and she said she was calling to schedule her 10 day post op appointment, the call was sent to me as Dr Carlos Camargo had done surgery on her in the past. Karen Smith is going to call the patient back now and reach out to Dr Florian Zarate as well as the patient only wants to see him and not a NP. Pt in agreement.

## 2018-09-04 NOTE — TELEPHONE ENCOUNTER
E/R surgery this weekend, appendix.  did gastric on patient. Having discomfort. Discharged yesterday. [phone].

## 2018-09-06 ENCOUNTER — OFFICE VISIT (OUTPATIENT)
Dept: SURGERY | Age: 38
End: 2018-09-06

## 2018-09-06 VITALS
SYSTOLIC BLOOD PRESSURE: 128 MMHG | HEIGHT: 69 IN | HEART RATE: 87 BPM | TEMPERATURE: 98.1 F | WEIGHT: 173 LBS | BODY MASS INDEX: 25.62 KG/M2 | DIASTOLIC BLOOD PRESSURE: 78 MMHG | RESPIRATION RATE: 16 BRPM | OXYGEN SATURATION: 98 %

## 2018-09-06 DIAGNOSIS — Z09 POSTOPERATIVE EXAMINATION: Primary | ICD-10-CM

## 2018-09-06 RX ORDER — MAGNESIUM 200 MG
1000 TABLET ORAL DAILY
COMMUNITY

## 2018-09-06 NOTE — PROGRESS NOTES
1. Have you been to the ER, urgent care clinic since your last visit? Hospitalized since your last visit? No-not since surgery 2. Have you seen or consulted any other health care providers outside of the [address] [place] [address] since your last visit? Include any pap smears or colon screening.  No

## 2018-09-06 NOTE — PROGRESS NOTES
Pt here for follow up of her Lap Appendectomy. She complains of some incisional soreness. Tolerating diet. Visit Vitals  /78 (BP 1 Location: Right arm, BP Patient Position: Sitting)  Pulse 87  Temp 98.1 °F (36.7 °C) (Oral)  Resp 16  
 Ht 5' 9\" (1.753 m)  Wt 173 lb (78.5 kg)  SpO2 98%  BMI 25.55 kg/m2 Gen-Alert in NAD Abd- S/appropriately tender. Incisions healing well Pathology- Acute appendicitis S/p Lap Appendectomy Doing well Continue to limit physical activity.

## 2018-09-06 NOTE — MR AVS SNAPSHOT
[address] [facility] [address] [place] [address] 
[phone] Patient: Felix Zamudio MRN: [id number] GOT:3/[id number]/[id number] Visit Information Date & Time Provider Department Dept. Phone Encounter #  
 9/6/2018  9:30 AM Kendrick Walters MD [facility] [phone] [id number] Follow-up Instructions Return if symptoms worsen or fail to improve. Upcoming Health Maintenance Date Due Pneumococcal 19-64 Medium Risk (1 of 1 - PPSV23) 6/13/1999 DTaP/Tdap/Td series (1 - Tdap) 6/13/2001 PAP AKA CERVICAL CYTOLOGY 6/13/2001 Influenza Age 5 to Adult 8/1/2018 Allergies as of 9/6/2018  Review Complete On: 9/6/2018 By: Sophy Livingston LPN Severity Noted Reaction Type Reactions Other Food  03/02/2017    Other (comments) Walnuts and pineapples cause mouth irritation. Current Immunizations  Never Reviewed No immunizations on file. Not reviewed this visit You Were Diagnosed With   
  
 Codes Comments Postoperative examination    -  Primary ICD-10-CM: L30 ICD-9-CM: V67.00 Vitals BP Pulse Temp Resp Height(growth percentile) Weight(growth percentile) 128/78 (BP 1 Location: Right arm, BP Patient Position: Sitting) 87 98.1 °F (36.7 °C) (Oral) 16 5' 9\" (1.753 m) 173 lb (78.5 kg) LMP SpO2 BMI OB Status Smoking Status 08/10/2018 (Approximate) 98% 25.55 kg/m2 Having regular periods Never Smoker BMI and BSA Data Body Mass Index Body Surface Area 25.55 kg/m 2 1.95 m 2 Preferred Pharmacy Pharmacy Name Phone [id number] Lydia Vann, [address] [facility] [phone] Your Updated Medication List  
  
   
This list is accurate as of 9/6/18 10:36 AM.  Always use your most recent med list.  
  
  
  
  
 busPIRone 10 mg tablet Commonly known as:  BUSPAR Take 10 mg by mouth three (3) times daily. * VITAMIN B-12 1,000 mcg sublingual tablet Generic drug:  cyanocobalamin Take 1,000 mcg by mouth daily. * cyanocobalamin (vitamin B-12) 1,000 mcg/mL Kit 1 Dose by Injection route every thirty (30) days. dextroamphetamine-amphetamine 20 mg tablet Commonly known as:  ADDERALL Take 20 mg by mouth daily. ergocalciferol 50,000 unit capsule Commonly known as:  ERGOCALCIFEROL Take 1 Cap by mouth every seven (7) days. Indications: VITAMIN D DEFICIENCY (HIGH DOSE THERAPY)  
  
 hyoscyamine SL 0.125 mg SL tablet Commonly known as:  LEVSIN/SL  
1 Tab by SubLINGual route every four (4) hours as needed for Cramping. LORazepam 0.5 mg tablet Commonly known as:  ATIVAN Take 1 Tab by mouth every eight (8) hours as needed for Anxiety. Max Daily Amount: 1.5 mg.  
  
 multivitamin tablet Commonly known as:  ONE A DAY Take 1 Tab by mouth daily. Omeprazole delayed release 20 mg tablet Commonly known as:  PRILOSEC D/R Take 1 Tab by mouth daily. oxyCODONE-acetaminophen 5-325 mg per tablet Commonly known as:  PERCOCET Take 1-2 Tabs by mouth every six (6) hours as needed for Pain. Max Daily Amount: 8 Tabs. Syringe with Needle (Disp) 3 mL 25 gauge x 1\" Syrg Commonly known as:  MONOJECT 3CC SYR 25GX1\" 1 Syringe by Does Not Apply route every thirty (30) days. Syringe with Needle, Safety 3 mL 25 gauge x 1\" Syrg  
1 Units by Does Not Apply route every thirty (30) days. * Notice: This list has 2 medication(s) that are the same as other medications prescribed for you. Read the directions carefully, and ask your doctor or other care provider to review them with you. Follow-up Instructions Return if symptoms worsen or fail to improve. Introducing [facility]! Dear Kimber Yi: Thank you for requesting a [email or web address] account. Our records indicate that you already have an active [email or web address] account.   You can access your account anytime at [email or web address] Did you know that you can access your hospital and ER discharge instructions at any time in Isowalk? You can also review all of your test results from your hospital stay or ER visit. Additional Information If you have questions, please visit the Frequently Asked Questions section of the Isowalk website at [email or web address] Remember, Isowalk is NOT to be used for urgent needs. For medical emergencies, dial 911. Now available from your iPhone and Android! Please provide this summary of care documentation to your next provider. Your primary care clinician is listed as Joshua Lebron. If you have any questions after today's visit, please call [phone].

## 2019-01-29 ENCOUNTER — OFFICE VISIT (OUTPATIENT)
Dept: SURGERY | Age: 39
End: 2019-01-29

## 2019-01-29 VITALS
OXYGEN SATURATION: 98 % | HEIGHT: 69 IN | DIASTOLIC BLOOD PRESSURE: 80 MMHG | SYSTOLIC BLOOD PRESSURE: 110 MMHG | BODY MASS INDEX: 24.29 KG/M2 | HEART RATE: 83 BPM | WEIGHT: 164 LBS | RESPIRATION RATE: 20 BRPM | TEMPERATURE: 97.3 F

## 2019-01-29 DIAGNOSIS — E28.2 PCOS (POLYCYSTIC OVARIAN SYNDROME): Primary | ICD-10-CM

## 2019-01-29 NOTE — PROGRESS NOTES
Subjective:      Lillie Snider is a 45 y.o. female presents for postop care 2 years following laparoscopic sleeve gastrectomy. Appetite is good. Eating a regular diet without difficulty. Bowel movements are regular. The patient is not having any pain. She notes skin irritation beneath pannus; no GERD symptoms or regurgitation. Objective:     Visit Vitals  /80   Pulse 83   Temp 97.3 °F (36.3 °C)   Resp 20   Ht 5' 9\" (1.753 m)   Wt 164 lb (74.4 kg)   SpO2 98%   BMI 24.22 kg/m²       General:  alert, cooperative, no distress, appears stated age   Abdomen: deferred   Incision:   deferred     Assessment:     Doing well postoperatively. Excellent weight loss results. Plan:     1. Continue current medications. 2. Bariatric diet. Recommended scheduled exercise to her weight maintenance plan. 3. Plastic Surgery contact info provided for evaluation, possible panniculectomy.

## 2019-01-29 NOTE — PATIENT INSTRUCTIONS
Polycystic Ovary Syndrome: Care Instructions  Your Care Instructions  Polycystic ovary syndrome, or PCOS, means a woman's hormones are out of balance. It can cause problems with your periods and make it hard to get pregnant. Doctors don't know for sure what causes PCOS, but it seems to run in families. It also seems to be linked to obesity and a risk for diabetes. If you have PCOS, your sisters and daughters have a higher chance of getting it too. You may have other symptoms. These include weight gain, acne, too much hair growth on the face or body, high blood pressure, and high blood sugar. Your ovaries may have cysts on them. These cysts are growths filled with fluid. Keep in mind that although you may not have regular periods, you can still get pregnant. Talk to your doctor about birth control if you do not want to get pregnant. Sometimes the hormone changes with PCOS can also make it hard for some women to get pregnant. If this is a concern, talk to your doctor about treatment for this problem. Women who have PCOS can go for months or longer with no period. Your doctor may recommend medicines that can help get your cycles back to normal.  Follow-up care is a key part of your treatment and safety. Be sure to make and go to all appointments, and call your doctor if you are having problems. It's also a good idea to know your test results and keep a list of the medicines you take. How can you care for yourself at home? · Take your medicines exactly as prescribed. Call your doctor if you think you are having a problem with your medicine. · Eat a healthy diet. Include fruits, vegetables, beans, and whole grains in your diet each day. · If you are overweight, losing weight can help with many of the symptoms of PCOS. Talk to your doctor about safe ways to lose weight. · Get at least 30 minutes of exercise on most days of the week. Walking is a good choice.  Or you can run, swim, cycle, or play tennis or team sports. · For hair growth you don't want, try bleaching, plucking, electrolysis, or laser therapy. · Acne can be treated with over-the-counter medicines. Look for ones that have benzoyl peroxide or salicylic acid in them. When should you call for help? Call your doctor now or seek immediate medical care if:    · You have severe vaginal bleeding.     · You have new or worse belly or pelvic pain.    Watch closely for changes in your health, and be sure to contact your doctor if:    · You do not get better as expected.     · You have unusual vaginal bleeding. Where can you learn more? Go to http://jessee-gonzalo.info/. Enter Z413 in the search box to learn more about \"Polycystic Ovary Syndrome: Care Instructions. \"  Current as of: May 14, 2018  Content Version: 11.9  © 3221-5738 [employer]. Care instructions adapted under license by [employer] (which disclaims liability or warranty for this information). If you have questions about a medical condition or this instruction, always ask your healthcare professional. [address] any warranty or liability for your use of this information.

## 2019-01-29 NOTE — PROGRESS NOTES
1. Have you been to the ER, urgent care clinic since your last visit? Hospitalized since your last visit? No    2. Have you seen or consulted any other health care providers outside of the [address] [place] [address] since your last visit? Include any pap smears or colon screening.  No

## 2019-12-02 ENCOUNTER — TELEPHONE (OUTPATIENT)
Dept: SURGERY | Age: 39
End: 2019-12-02

## 2020-11-06 ENCOUNTER — TELEPHONE (OUTPATIENT)
Dept: SURGERY | Age: 40
End: 2020-11-06

## 2021-03-17 ENCOUNTER — TELEPHONE (OUTPATIENT)
Dept: SURGERY | Age: 41
End: 2021-03-17

## 2021-05-12 ENCOUNTER — OFFICE VISIT (OUTPATIENT)
Dept: SURGERY | Age: 41
End: 2021-05-12
Payer: COMMERCIAL

## 2021-05-12 VITALS
TEMPERATURE: 98.2 F | RESPIRATION RATE: 20 BRPM | BODY MASS INDEX: 34.07 KG/M2 | SYSTOLIC BLOOD PRESSURE: 132 MMHG | WEIGHT: 230 LBS | OXYGEN SATURATION: 98 % | HEART RATE: 92 BPM | HEIGHT: 69 IN | DIASTOLIC BLOOD PRESSURE: 88 MMHG

## 2021-05-12 DIAGNOSIS — D64.9 ANEMIA, UNSPECIFIED TYPE: ICD-10-CM

## 2021-05-12 DIAGNOSIS — E55.9 VITAMIN D DEFICIENCY: ICD-10-CM

## 2021-05-12 DIAGNOSIS — Z98.84 S/P LAPAROSCOPIC SLEEVE GASTRECTOMY: ICD-10-CM

## 2021-05-12 DIAGNOSIS — R63.5 WEIGHT GAIN: ICD-10-CM

## 2021-05-12 DIAGNOSIS — F10.10 ALCOHOL ABUSE: ICD-10-CM

## 2021-05-12 DIAGNOSIS — E53.8 VITAMIN B12 DEFICIENCY: ICD-10-CM

## 2021-05-12 DIAGNOSIS — E66.01 MORBID OBESITY (HCC): Primary | ICD-10-CM

## 2021-05-12 PROCEDURE — 99214 OFFICE O/P EST MOD 30 MIN: CPT | Performed by: NURSE PRACTITIONER

## 2021-05-12 RX ORDER — SERTRALINE HYDROCHLORIDE 50 MG/1
TABLET, FILM COATED ORAL DAILY
COMMUNITY

## 2021-05-12 RX ORDER — NALTREXONE HYDROCHLORIDE 50 MG/1
50 TABLET, FILM COATED ORAL DAILY
COMMUNITY

## 2021-05-12 NOTE — PROGRESS NOTES
Pilar Dunn is a [address] y.[address] female with previous Sleeve gastrectomy surgery 4 years ago. . She has lost a total of 42 pounds since surgery. She  has gained 66 lbs since the last ov in 2018. Body mass index is 33.97 kg/m². Delsa Severance no nausea and no vomiting . Denies Acid reflux/heartburn. . She states in the past 3 years she has gotten a divorce and has a new boyfriend. Her boyfriend is an alcoholic and is currently in rehab in [place]. He has been there for a month. She has suffered pregnancy loss and a breast cancer scare in the past 2 years. She has started drinking in the past 2 years. She is currently drinking a bottle and a half of champagne or sparkling wine daily. She states that she gained 20 pounds when she was pregnant and was unable to lose that. She states that she is back to eating bad foods like before surgery and her portions are larger. She is recently seen her primary care and was placed on naloxone to help with cravings but has not noticed any difference. She states that she is extremely depressed due to her weight gain. She has not seen a therapist and states that she has not gone to a [facility]. Dietary recall - Breakfast coffee, shake, oatmeal 
Lunch-sandwich or [facility] 
Dinner-pizza, beet salad and pasta Snacks-Snickers, M&M, chips Vitamins: 
She does not consistently take her vitamins Ms. Sarai Buchanan has a reminder for a \"due or due soon\" health maintenance. I have asked that she contact her primary care provider for follow-up on this health maintenance. COMORBIDITY SLEEP APNEA                 {YES/NO:94729} GERD  (req.meds)            {YES/NO:55309} HYPERLIPIDEMIA            {YES/NO:67660} HYPERTENSION              {YES/NO:43670} DIABETES                         {YES/NO:12999} Current Outpatient Medications:  
  sertraline (Zoloft) 50 mg tablet, Take  by mouth daily. , Disp: , Rfl:  
  naltrexone (DEPADE) 50 mg tablet, Take 50 mg by mouth daily. , Disp: , Rfl:  
  dextroamphetamine-amphetamine (ADDERALL) 20 mg tablet, Take 20 mg by mouth daily. , Disp: , Rfl:  
  multivitamin (ONE A DAY) tablet, Take 1 Tab by mouth daily. , Disp: , Rfl:  
  cyanocobalamin (VITAMIN B-12) 1,000 mcg sublingual tablet, Take 1,000 mcg by mouth daily. , Disp: , Rfl:  
  oxyCODONE-acetaminophen (PERCOCET) 5-325 mg per tablet, Take 1-2 Tabs by mouth every six (6) hours as needed for Pain. Max Daily Amount: 8 Tabs., Disp: 20 Tab, Rfl: 0 
  Syringe with Needle, Disp, (MONOJECT 3CC SYR 25GX1\") 3 mL 25 gauge x 1\" syrg, 1 Syringe by Does Not Apply route every thirty (30) days. , Disp: 3 Syringe, Rfl: 3 
  ergocalciferol (ERGOCALCIFEROL) 50,000 unit capsule, Take 1 Cap by mouth every seven (7) days. Indications: VITAMIN D DEFICIENCY (HIGH DOSE THERAPY), Disp: 4 Cap, Rfl: 2   cyanocobalamin, vitamin B-12, 1,000 mcg/mL kit, 1 Dose by Injection route every thirty (30) days. , Disp: 12 Dose, Rfl: 0 
  Syringe with Needle, Safety 3 mL 25 gauge x 1\" syrg, 1 Units by Does Not Apply route every thirty (30) days. , Disp: 12 Pen Needle, Rfl: 0 
  LORazepam (ATIVAN) 0.5 mg tablet, Take 1 Tab by mouth every eight (8) hours as needed for Anxiety. Max Daily Amount: 1.5 mg., Disp: 15 Tab, Rfl: 0 
  busPIRone (BUSPAR) 10 mg tablet, Take 10 mg by mouth three (3) times daily. , Disp: , Rfl:  
  hyoscyamine SL (LEVSIN/SL) 0.125 mg SL tablet, 1 Tab by SubLINGual route every four (4) hours as needed for Cramping., Disp: 30 Tab, Rfl: 0 
  Omeprazole delayed release (PRILOSEC D/R) 20 mg tablet, Take 1 Tab by mouth daily. , Disp: 30 Tab, Rfl: 5 Visit Vitals /88 Pulse 92 Temp 98.2 °F (36.8 °C) Resp 20 Ht 5' 9\" (1.753 m) Wt 230 lb (104.3 kg) SpO2 98% BMI 33.97 kg/m²

## 2021-05-12 NOTE — PROGRESS NOTES
1. Have you been to the ER, urgent care clinic since your last visit? Hospitalized since your last visit?no    2. Have you seen or consulted any other health care providers outside of the [address] [place] [address] since your last visit? Include any pap smears or colon screening.  pcp

## 2021-05-12 NOTE — PROGRESS NOTES
HISTORY OF PRESENT ILLNESS           Zahida Howard is a 36 y.o. female with previous Sleeve gastrectomy surgery 4 years ago. . She has lost a total of 42 pounds since surgery. She  has gained 66 lbs since the last ov in 2018. Body mass index is 33.97 kg/m². Peder David no nausea and no vomiting . Denies Acid reflux/heartburn. . She states in the past 3 years she has gotten a divorce and has a new boyfriend. Her boyfriend is an alcoholic and is currently in rehab in [place]. He has been there for a month. She has suffered pregnancy loss and a breast cancer scare in the past 2 years. She has started drinking in the past 2 years. She is currently drinking a bottle and a half of champagne or sparkling wine daily. She states that she gained 20 pounds when she was pregnant and was unable to lose that. She states that she is back to eating bad foods like before surgery and her portions are larger. She is recently seen her primary care and was placed on naloxone to help with cravings but has not noticed any difference. She states that she is extremely depressed due to her weight gain. She has not seen a therapist and states that she has not gone to a [facility]. Dietary recall -    Breakfast coffee, shake, oatmeal  Lunch-sandwich or [employer]  Dinner-pizza, beet salad and pasta  Snacks-Snickers, M&M, chips           Vitamins:  She does not consistently take her vitamins           Ms. Julien Robertschester has a reminder for a \"due or due soon\" health maintenance. I have asked that she contact her primary care provider for follow-up on this health maintenance.           COMORBIDITY      SLEEP APNEA                 NO        GERD  (req.meds)            NO  HYPERLIPIDEMIA            NO  HYPERTENSION              NO         DIABETES                         NO             Current Outpatient Medications:     sertraline (Zoloft) 50 mg tablet, Take  by mouth daily. , Disp: , Rfl:     naltrexone (DEPADE) 50 mg tablet, Take 50 mg by mouth daily. , Disp: , Rfl:     dextroamphetamine-amphetamine (ADDERALL) 20 mg tablet, Take 20 mg by mouth daily. , Disp: , Rfl:     multivitamin (ONE A DAY) tablet, Take 1 Tab by mouth daily. , Disp: , Rfl:     cyanocobalamin (VITAMIN B-12) 1,000 mcg sublingual tablet, Take 1,000 mcg by mouth daily. , Disp: , Rfl:     oxyCODONE-acetaminophen (PERCOCET) 5-325 mg per tablet, Take 1-2 Tabs by mouth every six (6) hours as needed for Pain. Max Daily Amount: 8 Tabs., Disp: 20 Tab, Rfl: 0    Syringe with Needle, Disp, (MONOJECT 3CC SYR 25GX1\") 3 mL 25 gauge x 1\" syrg, 1 Syringe by Does Not Apply route every thirty (30) days. , Disp: 3 Syringe, Rfl: 3    ergocalciferol (ERGOCALCIFEROL) 50,000 unit capsule, Take 1 Cap by mouth every seven (7) days. Indications: VITAMIN D DEFICIENCY (HIGH DOSE THERAPY), Disp: 4 Cap, Rfl: 2    cyanocobalamin, vitamin B-12, 1,000 mcg/mL kit, 1 Dose by Injection route every thirty (30) days. , Disp: 12 Dose, Rfl: 0    Syringe with Needle, Safety 3 mL 25 gauge x 1\" syrg, 1 Units by Does Not Apply route every thirty (30) days. , Disp: 12 Pen Needle, Rfl: 0    LORazepam (ATIVAN) 0.5 mg tablet, Take 1 Tab by mouth every eight (8) hours as needed for Anxiety. Max Daily Amount: 1.5 mg., Disp: 15 Tab, Rfl: 0    busPIRone (BUSPAR) 10 mg tablet, Take 10 mg by mouth three (3) times daily. , Disp: , Rfl:     hyoscyamine SL (LEVSIN/SL) 0.125 mg SL tablet, 1 Tab by SubLINGual route every four (4) hours as needed for Cramping., Disp: 30 Tab, Rfl: 0    Omeprazole delayed release (PRILOSEC D/R) 20 mg tablet, Take 1 Tab by mouth daily. , Disp: 30 Tab, Rfl: 5        Visit Vitals  /88   Pulse 92   Temp 98.2 °F (36.8 °C)   Resp 20   Ht 5' 9\" (1.753 m)   Wt 230 lb (104.3 kg)   SpO2 98%   BMI 33.97 kg/m²              HPI    Review of Systems   Respiratory: Negative for shortness of breath. Cardiovascular: Negative for chest pain.    Gastrointestinal: Negative for abdominal pain, heartburn, nausea and vomiting. Neurological: Negative for dizziness and headaches. Physical Exam  Constitutional:       Appearance: She is well-developed. HENT:      Mouth/Throat:      Mouth: Mucous membranes are moist.   Neck:      Musculoskeletal: Normal range of motion and neck supple. Cardiovascular:      Rate and Rhythm: Normal rate and regular rhythm. Heart sounds: No murmur. No friction rub. No gallop. Pulmonary:      Effort: Pulmonary effort is normal.      Breath sounds: Normal breath sounds. Abdominal:      General: Bowel sounds are normal. There is no distension. Palpations: Abdomen is soft. Tenderness: There is no abdominal tenderness. Skin:     General: Skin is warm and dry. Neurological:      Mental Status: She is alert and oriented to person, place, and time. Psychiatric:      Comments: tearful         ASSESSMENT and PLAN    Shahla Valdes is a 36 y.o. female with previous Sleeve gastrectomy surgery 4 years ago. . She has lost a total of 42 pounds since surgery. She  has gained 66 lbs since the last ov in 2018. Body mass index is 33.97 kg/m². Janet Bhattiley no nausea and no vomiting . Denies Acid reflux/heartburn. . She states in the past 3 years she has gotten a divorce and has a new boyfriend. Her boyfriend is an alcoholic and is currently in rehab in [place]. He has been there for a month. She has suffered pregnancy loss and a breast cancer scare in the past 2 years. She has started drinking in the past 2 years. She is currently drinking a bottle and a half of champagne or sparkling wine daily. She states that she gained 20 pounds when she was pregnant and was unable to lose that. She states that she is back to eating bad foods like before surgery and her portions are larger. She is recently seen her primary care and was placed on naloxone to help with cravings but has not noticed any difference. She states that she is extremely depressed due to her weight gain.   She has not seen a therapist and states that she has not gone to a [place] [address] meeting. We discussed that she needs to be willing to stop drinking. Recommend starting with AA, employee assistance programs or outpatient/inpatient rehab. Mentioned reaching out to [employer]. PAtient states that she wanted to look online to fine a therapist.   Other options are referral to Dr.Peter markham. We will discuss refer to metabolic program once she has completed some of the above. Advised patient regard to diet that is high-protein, low-fat, low-sugar, limited carbohydrates. Strive for 50-60 grams of protein daily. If having a snack, foods that are protein or fiber rich. . No eating/drinking together, chew foods well, and portion control. Measure meals. Discussed snacking behavior and to [facility] pay attention to behavioral factor and habits. Drink at least 40-64 ounces of water or non-calorie/non-carbonated beverages daily. Continue vitamin regiment daily. Exercise at least 3 days a week with cardiovascular and strength training. . Advised to call office if any questions/concerns. 35 Minutes spent face to face with patient, >50 % of time spent counseling. She will follow up in 3 months.

## 2021-06-25 ENCOUNTER — HOSPITAL ENCOUNTER (EMERGENCY)
Age: 41
Discharge: HOME | End: 2021-06-25
Attending: STUDENT IN AN ORGANIZED HEALTH CARE EDUCATION/TRAINING PROGRAM
Payer: COMMERCIAL

## 2021-06-25 VITALS
HEART RATE: 78 BPM | TEMPERATURE: 99 F | SYSTOLIC BLOOD PRESSURE: 150 MMHG | OXYGEN SATURATION: 99 % | DIASTOLIC BLOOD PRESSURE: 97 MMHG | RESPIRATION RATE: 16 BRPM

## 2021-06-25 DIAGNOSIS — R11.2 NON-INTRACTABLE VOMITING WITH NAUSEA, UNSPECIFIED VOMITING TYPE: ICD-10-CM

## 2021-06-25 DIAGNOSIS — R55 SYNCOPE, UNSPECIFIED SYNCOPE TYPE: Primary | ICD-10-CM

## 2021-06-25 LAB
ALBUMIN SERPL-MCNC: 4 G/DL (ref 3.5–5)
ALBUMIN/GLOB SERPL: 1.1 {RATIO} (ref 1.1–2.2)
ALP SERPL-CCNC: 50 U/L (ref 45–117)
ALT SERPL-CCNC: 26 U/L (ref 12–78)
ANION GAP SERPL CALC-SCNC: 4 MMOL/L (ref 5–15)
APPEARANCE UR: CLEAR
AST SERPL-CCNC: 18 U/L (ref 15–37)
ATRIAL RATE: 82 BPM
BACTERIA URNS QL MICRO: ABNORMAL /HPF
BASOPHILS # BLD: 0 K/UL (ref 0–0.1)
BASOPHILS NFR BLD: 1 % (ref 0–1)
BILIRUB SERPL-MCNC: 0.3 MG/DL (ref 0.2–1)
BILIRUB UR QL: NEGATIVE
BUN SERPL-MCNC: 22 MG/DL (ref 6–20)
BUN/CREAT SERPL: 25 (ref 12–20)
CALCIUM SERPL-MCNC: 9.3 MG/DL (ref 8.5–10.1)
CALCULATED P AXIS, ECG09: 24 DEGREES
CALCULATED R AXIS, ECG10: 26 DEGREES
CALCULATED T AXIS, ECG11: 25 DEGREES
CHLORIDE SERPL-SCNC: 106 MMOL/L (ref 97–108)
CO2 SERPL-SCNC: 28 MMOL/L (ref 21–32)
COLOR UR: ABNORMAL
COMMENT, HOLDF: NORMAL
CREAT SERPL-MCNC: 0.88 MG/DL (ref 0.55–1.02)
DIAGNOSIS, 93000: NORMAL
DIFFERENTIAL METHOD BLD: NORMAL
EOSINOPHIL # BLD: 0.1 K/UL (ref 0–0.4)
EOSINOPHIL NFR BLD: 1 % (ref 0–7)
EPITH CASTS URNS QL MICRO: ABNORMAL /LPF
ERYTHROCYTE [DISTWIDTH] IN BLOOD BY AUTOMATED COUNT: 12.7 % (ref 11.5–14.5)
GLOBULIN SER CALC-MCNC: 3.7 G/DL (ref 2–4)
GLUCOSE SERPL-MCNC: 109 MG/DL (ref 65–100)
GLUCOSE UR STRIP.AUTO-MCNC: NEGATIVE MG/DL
HCG SERPL QL: NEGATIVE
HCT VFR BLD AUTO: 40.1 % (ref 35–47)
HGB BLD-MCNC: 13.6 G/DL (ref 11.5–16)
HGB UR QL STRIP: NEGATIVE
HYALINE CASTS URNS QL MICRO: ABNORMAL /LPF (ref 0–5)
IMM GRANULOCYTES # BLD AUTO: 0 K/UL (ref 0–0.04)
IMM GRANULOCYTES NFR BLD AUTO: 0 % (ref 0–0.5)
KETONES UR QL STRIP.AUTO: NEGATIVE MG/DL
LEUKOCYTE ESTERASE UR QL STRIP.AUTO: NEGATIVE
LIPASE SERPL-CCNC: 147 U/L (ref 73–393)
LYMPHOCYTES # BLD: 1.6 K/UL (ref 0.8–3.5)
LYMPHOCYTES NFR BLD: 23 % (ref 12–49)
MCH RBC QN AUTO: 31.9 PG (ref 26–34)
MCHC RBC AUTO-ENTMCNC: 33.9 G/DL (ref 30–36.5)
MCV RBC AUTO: 93.9 FL (ref 80–99)
MONOCYTES # BLD: 0.5 K/UL (ref 0–1)
MONOCYTES NFR BLD: 7 % (ref 5–13)
NEUTS SEG # BLD: 4.9 K/UL (ref 1.8–8)
NEUTS SEG NFR BLD: 68 % (ref 32–75)
NITRITE UR QL STRIP.AUTO: NEGATIVE
NRBC # BLD: 0 K/UL (ref 0–0.01)
NRBC BLD-RTO: 0 PER 100 WBC
P-R INTERVAL, ECG05: 118 MS
PH UR STRIP: 5.5 [PH] (ref 5–8)
PLATELET # BLD AUTO: 252 K/UL (ref 150–400)
PMV BLD AUTO: 10.1 FL (ref 8.9–12.9)
POTASSIUM SERPL-SCNC: 3.8 MMOL/L (ref 3.5–5.1)
PROT SERPL-MCNC: 7.7 G/DL (ref 6.4–8.2)
PROT UR STRIP-MCNC: NEGATIVE MG/DL
Q-T INTERVAL, ECG07: 354 MS
QRS DURATION, ECG06: 82 MS
QTC CALCULATION (BEZET), ECG08: 413 MS
RBC # BLD AUTO: 4.27 M/UL (ref 3.8–5.2)
RBC #/AREA URNS HPF: ABNORMAL /HPF (ref 0–5)
SAMPLES BEING HELD,HOLD: NORMAL
SODIUM SERPL-SCNC: 138 MMOL/L (ref 136–145)
SP GR UR REFRACTOMETRY: 1.03 (ref 1–1.03)
TROPONIN I SERPL-MCNC: <0.05 NG/ML
UR CULT HOLD, URHOLD: NORMAL
UROBILINOGEN UR QL STRIP.AUTO: 0.2 EU/DL (ref 0.2–1)
VENTRICULAR RATE, ECG03: 82 BPM
WBC # BLD AUTO: 7.1 K/UL (ref 3.6–11)
WBC URNS QL MICRO: ABNORMAL /HPF (ref 0–4)

## 2021-06-25 PROCEDURE — 36415 COLL VENOUS BLD VENIPUNCTURE: CPT

## 2021-06-25 PROCEDURE — 96360 HYDRATION IV INFUSION INIT: CPT

## 2021-06-25 PROCEDURE — 96374 THER/PROPH/DIAG INJ IV PUSH: CPT

## 2021-06-25 PROCEDURE — 93005 ELECTROCARDIOGRAM TRACING: CPT

## 2021-06-25 PROCEDURE — 99283 EMERGENCY DEPT VISIT LOW MDM: CPT

## 2021-06-25 PROCEDURE — 96361 HYDRATE IV INFUSION ADD-ON: CPT

## 2021-06-25 PROCEDURE — 84703 CHORIONIC GONADOTROPIN ASSAY: CPT

## 2021-06-25 PROCEDURE — 83690 ASSAY OF LIPASE: CPT

## 2021-06-25 PROCEDURE — 85025 COMPLETE CBC W/AUTO DIFF WBC: CPT

## 2021-06-25 PROCEDURE — 84484 ASSAY OF TROPONIN QUANT: CPT

## 2021-06-25 PROCEDURE — 81001 URINALYSIS AUTO W/SCOPE: CPT

## 2021-06-25 PROCEDURE — 80053 COMPREHEN METABOLIC PANEL: CPT

## 2021-06-25 PROCEDURE — 74011250636 HC RX REV CODE- 250/636: Performed by: STUDENT IN AN ORGANIZED HEALTH CARE EDUCATION/TRAINING PROGRAM

## 2021-06-25 RX ORDER — ONDANSETRON 2 MG/ML
4 INJECTION INTRAMUSCULAR; INTRAVENOUS
Status: DISCONTINUED | OUTPATIENT
Start: 2021-06-25 | End: 2021-06-25 | Stop reason: HOSPADM

## 2021-06-25 RX ADMIN — SODIUM CHLORIDE 1000 ML: 9 INJECTION, SOLUTION INTRAVENOUS at 16:56

## 2021-06-25 NOTE — ED PROVIDER NOTES
Chief Complaint   Patient presents with    Vomiting     This is a 60-year-old female with a history of anxiety, depression, and PCOS, remote history of gastric sleeve, presenting with a syncopal episode that occurred today while she was lying on her couch. She started feeling lightheaded like she was about to faint yesterday after she had 2 episodes of vomiting, does note some loose stool today but the vomiting has since resolved. However she has continued to feel lightheaded throughout the day and feels like she might of passed out briefly earlier this afternoon. No fevers, cough, she denies any associated chest pain, shortness of breath, abdominal pain, she has no urinary complaints. She denies any history of structural heart disease or dysrhythmia to her knowledge. Since being in the emergency department she has started to feel a bit better. .  No other system complaints. Symptoms are moderate in nature without any alleviating or exacerbating factors.       Past Medical History:   Diagnosis Date    Anxiety     Asthma     ATHLETIC INDUCED ASTHMA    Depression     Morbid obesity ([address]     PCOS (polycystic ovarian syndrome)        Past Surgical History:   Procedure Laterality Date    HX APPENDECTOMY  08/31/2018    [facility]Dr. Anthony Humphrey    HX GASTRECTOMY  05/25/2017    laparoscopic sleeve gastrectomy    HX HEENT  1985    tubes in ears as child    HX HEENT  1998    wisdom teeth removed    HX OTHER SURGICAL Bilateral 2011    Liposuction          Family History:   Problem Relation Age of Onset    Hypertension Mother     Ulcerative Colitis Mother     Heart Disease Father     Hypertension Father     Heart Surgery Father         AORTIC VALVE REPLACED AND ABLATION, HISTORY OF AFIB    Cancer Maternal Grandmother         BREAST CANCER    Heart Disease Paternal Grandfather     Anesth Problems Neg Hx        Social History     Socioeconomic History    Marital status:      Spouse name: Not on file    Number of children: Not on file    Years of education: Not on file    Highest education level: Not on file   Occupational History    Not on file   Tobacco Use    Smoking status: Never Smoker    Smokeless tobacco: Never Used   Substance and Sexual Activity    Alcohol use: Yes     Comment: daily    Drug use: No    Sexual activity: Yes     Birth control/protection: None   Other Topics Concern    Not on file   Social History Narrative    Contract work; late nights    Moved from [place] for step-son (age 6)    In the home with spouse and step son (70% of the time)     Social Determinants of Health     Financial Resource Strain:     Difficulty of Paying Living Expenses:    Food Insecurity:     Worried About Running Out of Food in the Last Year:     [address] in the Last Year:    Transportation Needs:     Lack of Transportation (Medical):  Lack of Transportation (Non-Medical):    Physical Activity:     Days of Exercise per Week:     Minutes of Exercise per Session:    Stress:     Feeling of Stress :    Social Connections:     Frequency of Communication with Friends and Family:     Frequency of Social Gatherings with Friends and Family:     Attends [employer] Services:     Active Member of Clubs or Organizations:     Attends Club or Organization Meetings:     Marital Status:    Intimate Partner Violence:     Fear of Current or Ex-Partner:     Emotionally Abused:     Physically Abused:     Sexually Abused: ALLERGIES: Other food    Review of Systems   Constitutional: Negative for fever. HENT: Negative for facial swelling. Eyes: Negative for redness. Respiratory: Negative for shortness of breath. Cardiovascular: Negative for chest pain. Gastrointestinal: Positive for vomiting. Negative for abdominal pain. Genitourinary: Negative for difficulty urinating. Musculoskeletal: Negative for gait problem. Neurological: Positive for light-headedness. Psychiatric/Behavioral: Negative for confusion. Vitals:    06/25/21 1435   BP: (!) 150/97   Pulse: 78   Resp: 16   Temp: 99 °F (37.2 °C)   SpO2: 99%            Physical Exam  General:  Awake and alert, NAD  HEENT:  NC/AT, equal pupils, moist mucous membranes  Neck:   Normal inspection, full range of motion  Cardiac:  RRR, no murmurs  Respiratory:  Clear bilaterally, no wheezes, rales, rhonchi  Abdomen:  Soft and nontender, nondistended  Extremities: Warm and well perfused, no peripheral edema  Neuro:  Moving all extremities symmetrically without gross motor deficit  Skin:   No rashes or pallor    RESULTS  Recent Results (from the past 12 hour(s))   EKG, 12 LEAD, INITIAL    Collection Time: 06/25/21  4:04 PM   Result Value Ref Range    Ventricular Rate 82 BPM    Atrial Rate 82 BPM    P-R Interval 118 ms    QRS Duration 82 ms    Q-T Interval 354 ms    QTC Calculation (Bezet) 413 ms    Calculated P Axis 24 degrees    Calculated R Axis 26 degrees    Calculated T Axis 25 degrees    Diagnosis       Normal sinus rhythm  No previous ECGs available  Confirmed by Alysia Deleon MD ([id number]) on 6/25/2021 9:52:20 PM     SAMPLES BEING HELD    Collection Time: 06/25/21  4:11 PM   Result Value Ref Range    SAMPLES BEING HELD 1RED     COMMENT        Add-on orders for these samples will be processed based on acceptable specimen integrity and analyte stability, which may vary by analyte.    CBC WITH AUTOMATED DIFF    Collection Time: 06/25/21  4:11 PM   Result Value Ref Range    WBC 7.1 3.6 - 11.0 K/uL    RBC 4.27 3.80 - 5.20 M/uL    HGB 13.6 11.5 - 16.0 g/dL    HCT 40.1 35.0 - 47.0 %    MCV 93.9 80.0 - 99.0 FL    MCH 31.9 26.0 - 34.0 PG    MCHC 33.9 30.0 - 36.5 g/dL    RDW 12.7 11.5 - 14.5 %    PLATELET 103 617 - 945 K/uL    MPV 10.1 8.9 - 12.9 FL    NRBC 0.0 0  WBC    ABSOLUTE NRBC 0.00 0.00 - 0.01 K/uL    NEUTROPHILS 68 32 - 75 %    LYMPHOCYTES 23 12 - 49 %    MONOCYTES 7 5 - 13 %    EOSINOPHILS 1 0 - 7 %    BASOPHILS 1 0 - 1 %    IMMATURE GRANULOCYTES 0 0.0 - 0.5 %    ABS. NEUTROPHILS 4.9 1.8 - 8.0 K/UL    ABS. LYMPHOCYTES 1.6 0.8 - 3.5 K/UL    ABS. MONOCYTES 0.5 0.0 - 1.0 K/UL    ABS. EOSINOPHILS 0.1 0.0 - 0.4 K/UL    ABS. BASOPHILS 0.0 0.0 - 0.1 K/UL    ABS. IMM. GRANS. 0.0 0.00 - 0.04 K/UL    DF AUTOMATED     METABOLIC PANEL, COMPREHENSIVE    Collection Time: 06/25/21  4:11 PM   Result Value Ref Range    Sodium 138 136 - 145 mmol/L    Potassium 3.8 3.5 - 5.1 mmol/L    Chloride 106 97 - 108 mmol/L    CO2 28 21 - 32 mmol/L    Anion gap 4 (L) 5 - 15 mmol/L    Glucose 109 (H) 65 - 100 mg/dL    BUN 22 (H) 6 - 20 MG/DL    Creatinine 0.88 0.55 - 1.02 MG/DL    BUN/Creatinine ratio 25 (H) 12 - 20      GFR est AA >60 >60 ml/min/1.73m2    GFR est non-AA >60 >60 ml/min/1.73m2    Calcium 9.3 8.5 - 10.1 MG/DL    Bilirubin, total 0.3 0.2 - 1.0 MG/DL    ALT (SGPT) 26 12 - 78 U/L    AST (SGOT) 18 15 - 37 U/L    Alk.  phosphatase 50 45 - 117 U/L    Protein, total 7.7 6.4 - 8.2 g/dL    Albumin 4.0 3.5 - 5.0 g/dL    Globulin 3.7 2.0 - 4.0 g/dL    A-G Ratio 1.1 1.1 - 2.2     TROPONIN I    Collection Time: 06/25/21  4:11 PM   Result Value Ref Range    Troponin-I, Qt. <0.05 <0.05 ng/mL   HCG QL SERUM    Collection Time: 06/25/21  4:11 PM   Result Value Ref Range    HCG, Ql. Negative NEG     URINALYSIS W/MICROSCOPIC    Collection Time: 06/25/21  4:58 PM   Result Value Ref Range    Color YELLOW/STRAW      Appearance CLEAR CLEAR      Specific gravity 1.026 1.003 - 1.030      pH (UA) 5.5 5.0 - 8.0      Protein Negative NEG mg/dL    Glucose Negative NEG mg/dL    Ketone Negative NEG mg/dL    Bilirubin Negative NEG      Blood Negative NEG      Urobilinogen 0.2 0.2 - 1.0 EU/dL    Nitrites Negative NEG      Leukocyte Esterase Negative NEG      WBC 0-4 0 - 4 /hpf    RBC 0-5 0 - 5 /hpf    Epithelial cells MODERATE (A) FEW /lpf    Bacteria 1+ (A) NEG /hpf    Hyaline cast 0-2 0 - 5 /lpf   URINE CULTURE HOLD SAMPLE    Collection Time: 06/25/21  4:58 PM Specimen: Serum; Urine   Result Value Ref Range    Urine culture hold        Urine on hold in Microbiology dept for 2 days. If unpreserved urine is submitted, it cannot be used for addtional testing after 24 hours, recollection will be required. LIPASE    Collection Time: 06/25/21  6:00 PM   Result Value Ref Range    Lipase 147 73 - 393 U/L        IMAGING  No results found. Procedures - none unless documented below  EKG as interpreted by me:  Normal sinus rhythm at a rate of 82, some artifactual distortion per interpretation but Axis is normal, intervals are normal, grossly there are no ST or T wave changes suggesting acute ischemia. ED course: Labs and EKG reviewed. IV fluids and Zofran given, she's feeling much better and requesting to go home. VS stable. She's clinically well appearing on exam.  Low suspicion for ACS clinically. EKG non ischemic and indicates sinus rhythm. Recommend close outpatient follow up with cardiology for further investigation including Holter monitoring, echocardiogram.  Will discharge home. The patient has been given verbal and written advice regarding today's presentation including reasons to re-attend, specifically signs and symptoms of concern or worsening condition were discussed and understood by the patient.      Impression: Syncope, nausea and vomiting  Disposition: Discharge home

## 2021-06-25 NOTE — DISCHARGE INSTRUCTIONS
- Your labs and EKG today did not demonstrate any emergent findings  - Follow up with your primary physician and with cardiology for reevaluation, consider outpatient Holter monitoring to investigate symptoms further  - Return immediately to the ER for chest pain, difficulty breathing, repeated episodes of passing out or persistent dizziness or lightheadedness, headache, or difficulty walking.

## 2021-06-30 ENCOUNTER — OFFICE VISIT (OUTPATIENT)
Dept: CARDIOLOGY CLINIC | Age: 41
End: 2021-06-30
Payer: COMMERCIAL

## 2021-06-30 ENCOUNTER — TELEPHONE (OUTPATIENT)
Dept: CARDIOLOGY CLINIC | Age: 41
End: 2021-06-30

## 2021-06-30 VITALS
HEART RATE: 82 BPM | SYSTOLIC BLOOD PRESSURE: 120 MMHG | WEIGHT: 229 LBS | OXYGEN SATURATION: 98 % | BODY MASS INDEX: 33.92 KG/M2 | RESPIRATION RATE: 18 BRPM | DIASTOLIC BLOOD PRESSURE: 86 MMHG | HEIGHT: 69 IN

## 2021-06-30 DIAGNOSIS — R00.2 PALPITATIONS: Primary | ICD-10-CM

## 2021-06-30 DIAGNOSIS — E66.01 MORBID OBESITY WITH BMI OF 40.0-44.9, ADULT (HCC): ICD-10-CM

## 2021-06-30 DIAGNOSIS — Z09 HOSPITAL DISCHARGE FOLLOW-UP: ICD-10-CM

## 2021-06-30 DIAGNOSIS — J45.20 MILD INTERMITTENT ASTHMA WITHOUT COMPLICATION: ICD-10-CM

## 2021-06-30 DIAGNOSIS — R40.20 LOC (LOSS OF CONSCIOUSNESS) (HCC): ICD-10-CM

## 2021-06-30 DIAGNOSIS — Z98.84 H/O BARIATRIC SURGERY: ICD-10-CM

## 2021-06-30 PROCEDURE — 99204 OFFICE O/P NEW MOD 45 MIN: CPT | Performed by: SPECIALIST

## 2021-06-30 PROCEDURE — 1111F DSCHRG MED/CURRENT MED MERGE: CPT | Performed by: SPECIALIST

## 2021-06-30 RX ORDER — SULFAMETHOXAZOLE AND TRIMETHOPRIM 400; 80 MG/1; MG/1
1 TABLET ORAL 2 TIMES DAILY
COMMUNITY

## 2021-06-30 RX ORDER — VITAMIN E CAP 100 UNIT 100 UNIT
100 CAP ORAL DAILY
COMMUNITY

## 2021-06-30 NOTE — LETTER
NOTIFICATION RETURN TO WORK     6/30/2021 4:24 PM    Ms. Emeterio [address] [facility] [address]      To Whom It May Concern:    Emeterio is currently under the care of [address] She will return to work on: Monday July 5, 2021 without restrictions. If there are questions or concerns please have the patient contact our office.     Sincerely,      Keena Liriano MD

## 2021-06-30 NOTE — TELEPHONE ENCOUNTER
Patient will  new work letter today or tomorrow. Returning to work on Monday 7/5/21 instead of July 1, 2021.

## 2021-06-30 NOTE — TELEPHONE ENCOUNTER
----- Message from Carmen Baez sent at 2021  2:40 PM EDT -----  Regardin day event monitor  Per Dr. Jules Gao 30 day event monitor for syncope, palpitations.      Please send monitor to [address] [place] [address]  [address] [facility]    Thank you,     Felicia Lugo

## 2021-06-30 NOTE — TELEPHONE ENCOUNTER
Enrolled with [employer] - Ordered and being shipped to address provided. ETA within 5-7 business days.

## 2021-06-30 NOTE — PROGRESS NOTES
HISTORY OF PRESENT ILLNESS  Zahida Shankar is a 39 y.o. female. She is seen for evaluation of palpitations and possible loss of consciousness. Almost a week ago she vomited and felt weak and spent a day in bed. The next day she sat on the couch and felt faint and believes that she may have passed out. She has been having occasional palpitations and fluttering felt in her chest but mostly in her throat. These occur less than once per week. Her mother has a history of possible paroxysmal supraventricular tachycardia and apparently her father had atrial fibrillation. She drinks 1 to 2 glasses of wine per day and does not smoke cigarettes. She had bariatric surgery in May 2018 and has lost 80 pounds. She had lost 150 pounds but gained some back during the pandemic. She had a normal echocardiogram done in March 2017. She takes Adderall 20 mg a day. Her father may have had hypertrophic cardiomyopathy. HPI  Patient Active Problem List   Diagnosis Code    Morbid obesity with BMI of 40.0-44.9, adult ([facility] E66.01, Z68.41    Palpitations R00.2    Mild intermittent asthma without complication Y21.30    PCOS (polycystic ovarian syndrome) E28.2    Morbid obesity (HCC) E66.01    Appendicitis K37     Current Outpatient Medications   Medication Sig Dispense Refill    trimethoprim-sulfamethoxazole (Bactrim)  mg per tablet Take 1 Tablet by mouth two (2) times a day.  vitamin e (E GEMS) 100 unit capsule Take 100 Units by mouth daily.  sertraline (Zoloft) 50 mg tablet Take  by mouth daily.  cyanocobalamin (VITAMIN B-12) 1,000 mcg sublingual tablet Take 1,000 mcg by mouth daily.  ergocalciferol (ERGOCALCIFEROL) 50,000 unit capsule Take 1 Cap by mouth every seven (7) days. Indications: VITAMIN D DEFICIENCY (HIGH DOSE THERAPY) 4 Cap 2    dextroamphetamine-amphetamine (ADDERALL) 20 mg tablet Take 20 mg by mouth daily.  multivitamin (ONE A DAY) tablet Take 1 Tab by mouth daily.  naltrexone (DEPADE) 50 mg tablet Take 50 mg by mouth daily. (Patient not taking: Reported on 6/30/2021)      LORazepam (ATIVAN) 0.5 mg tablet Take 1 Tab by mouth every eight (8) hours as needed for Anxiety. Max Daily Amount: 1.5 mg. (Patient not taking: Reported on 6/30/2021) 15 Tab 0    busPIRone (BUSPAR) 10 mg tablet Take 10 mg by mouth three (3) times daily. (Patient not taking: Reported on 6/30/2021)      hyoscyamine SL (LEVSIN/SL) 0.125 mg SL tablet 1 Tab by SubLINGual route every four (4) hours as needed for Cramping. (Patient not taking: Reported on 6/30/2021) 30 Tab 0    Omeprazole delayed release (PRILOSEC D/R) 20 mg tablet Take 1 Tab by mouth daily. (Patient not taking: Reported on 6/30/2021) 30 Tab 5     Past Medical History:   Diagnosis Date    Anxiety     Asthma     ATHLETIC INDUCED ASTHMA    Depression     Morbid obesity ([address]     PCOS (polycystic ovarian syndrome)      Past Surgical History:   Procedure Laterality Date    [facility]    HX GASTRECTOMY  05/25/2017    laparoscopic sleeve gastrectomy    HX HEENT  1985    tubes in ears as child    HX HEENT  1998    wisdom teeth removed    HX OTHER SURGICAL Bilateral 2011    Liposuction        Review of Systems   Constitutional: Positive for weight loss. Cardiovascular: Positive for palpitations. Neurological: Positive for loss of consciousness. Visit Vitals  /86 (BP 1 Location: Left upper arm, BP Patient Position: Sitting, BP Cuff Size: Adult)   Pulse 82   Resp 18   Ht 5' 9\" (1.753 m)   Wt 229 lb (103.9 kg)   SpO2 98%   BMI 33.82 kg/m²       Physical Exam  Vitals and nursing note reviewed. Constitutional:       Appearance: She is obese. HENT:      Head: Normocephalic. Right Ear: External ear normal.      Left Ear: External ear normal.      Nose: Nose normal.   Eyes:      Extraocular Movements: Extraocular movements intact.    Cardiovascular:      Rate and Rhythm: Normal rate and regular rhythm. Heart sounds: Normal heart sounds. Pulmonary:      Effort: Pulmonary effort is normal.      Breath sounds: Normal breath sounds. Abdominal:      Palpations: Abdomen is soft. Musculoskeletal:         General: Normal range of motion. Cervical back: Normal range of motion. Skin:     General: Skin is warm. Neurological:      General: No focal deficit present. Mental Status: She is alert. Psychiatric:         Mood and Affect: Mood normal.         ASSESSMENT and PLAN  It is unclear if her recent episode of possibly passing out was due to a viral illness or whether there was a significant arrhythmia. Her echo was normal almost 4 years ago but she has been on Adderall all this time. I will order a 30-day monitor and also have her return for an echocardiogram and see her afterwards in the office.

## 2021-06-30 NOTE — TELEPHONE ENCOUNTER
Patient call because she need her return to work note date changed. Please give her a call.       Phone [phone]

## 2021-06-30 NOTE — LETTER
NOTIFICATION RETURN TO WORK     6/30/2021 2:40 PM    Ms. Emeterio [address] [place] [address]      To Whom It May Concern:    Emeterio is currently under the care of [address] She will return to work on: Thursday 7/1/21 without restrictions. If there are questions or concerns please have the patient contact our office.         Sincerely,      Gloria Ford MD

## 2021-07-26 ENCOUNTER — TELEPHONE (OUTPATIENT)
Dept: CARDIOLOGY CLINIC | Age: 41
End: 2021-07-26

## 2021-07-26 NOTE — TELEPHONE ENCOUNTER
Patient states she would like to discuss concerns about current symptoms she is experiencing. Possibly rescheduling her echo appointment for an earlier date.         EGEUX:[phone]

## 2021-07-26 NOTE — TELEPHONE ENCOUNTER
Called patient. Verified patient's identity with two identifiers. Patient stated she has felt very tired and sleeps all the time. She took off work today and will need [facility] paperwork completed. Rescheduled her echo and appt with Dr. Virginia Castro from 8/5 to this Thursday 7/29. Patient verbalized understanding and denied further questions or concerns. Discussed signs and symptoms qualifying urgent care or call to office. Patient verbalized understanding and denied further questions or concerns.      Future Appointments   Date Time Provider Toby Saleh   7/29/2021  3:00 PM Sandeep SIDDIQUI   7/29/2021  3:40 PM MD SAFIA Vazquez AMB

## 2021-07-29 ENCOUNTER — ANCILLARY PROCEDURE (OUTPATIENT)
Dept: CARDIOLOGY CLINIC | Age: 41
End: 2021-07-29

## 2021-07-29 ENCOUNTER — OFFICE VISIT (OUTPATIENT)
Dept: CARDIOLOGY CLINIC | Age: 41
End: 2021-07-29
Payer: COMMERCIAL

## 2021-07-29 VITALS
DIASTOLIC BLOOD PRESSURE: 90 MMHG | WEIGHT: 237 LBS | HEART RATE: 92 BPM | OXYGEN SATURATION: 98 % | SYSTOLIC BLOOD PRESSURE: 120 MMHG | BODY MASS INDEX: 35.1 KG/M2 | HEIGHT: 69 IN | RESPIRATION RATE: 16 BRPM

## 2021-07-29 VITALS — BODY MASS INDEX: 35.1 KG/M2 | WEIGHT: 237 LBS | HEIGHT: 69 IN

## 2021-07-29 DIAGNOSIS — J45.20 MILD INTERMITTENT ASTHMA WITHOUT COMPLICATION: ICD-10-CM

## 2021-07-29 DIAGNOSIS — E66.01 MORBID OBESITY WITH BMI OF 40.0-44.9, ADULT (HCC): ICD-10-CM

## 2021-07-29 DIAGNOSIS — R00.2 PALPITATIONS: ICD-10-CM

## 2021-07-29 DIAGNOSIS — R00.2 PALPITATIONS: Primary | ICD-10-CM

## 2021-07-29 DIAGNOSIS — I49.3 VENTRICULAR ECTOPIC BEATS: ICD-10-CM

## 2021-07-29 DIAGNOSIS — Z98.84 H/O BARIATRIC SURGERY: ICD-10-CM

## 2021-07-29 DIAGNOSIS — Z09 HOSPITAL DISCHARGE FOLLOW-UP: ICD-10-CM

## 2021-07-29 DIAGNOSIS — R40.20 LOC (LOSS OF CONSCIOUSNESS) (HCC): ICD-10-CM

## 2021-07-29 DIAGNOSIS — E28.2 PCOS (POLYCYSTIC OVARIAN SYNDROME): ICD-10-CM

## 2021-07-29 LAB
ECHO AO ASC DIAM: 2.86 CM
ECHO AO ROOT DIAM: 2.81 CM
ECHO AV AREA PEAK VELOCITY: 2.73 CM2
ECHO AV AREA VTI: 2.99 CM2
ECHO AV AREA/BSA PEAK VELOCITY: 1.2 CM2/M2
ECHO AV AREA/BSA VTI: 1.3 CM2/M2
ECHO AV MEAN GRADIENT: 3.05 MMHG
ECHO AV PEAK GRADIENT: 5.57 MMHG
ECHO AV PEAK VELOCITY: 118.05 CM/S
ECHO AV VTI: 18.94 CM
ECHO LA AREA 4C: 15.85 CM2
ECHO LA MAJOR AXIS: 3.16 CM
ECHO LA MINOR AXIS: 1.42 CM
ECHO LA VOL 2C: 47.81 ML (ref 22–52)
ECHO LA VOL 4C: 32.84 ML (ref 22–52)
ECHO LA VOL BP: 49.04 ML (ref 22–52)
ECHO LA VOL/BSA BIPLANE: 22.09 ML/M2 (ref 16–28)
ECHO LA VOLUME INDEX A2C: 21.54 ML/M2 (ref 16–28)
ECHO LA VOLUME INDEX A4C: 14.79 ML/M2 (ref 16–28)
ECHO LV E' LATERAL VELOCITY: 14.77 CM/S
ECHO LV E' SEPTAL VELOCITY: 10.2 CM/S
ECHO LV EDV A2C: 64.33 ML
ECHO LV EDV A4C: 71.33 ML
ECHO LV EDV BP: 67.95 ML (ref 56–104)
ECHO LV EDV INDEX A4C: 32.1 ML/M2
ECHO LV EDV INDEX BP: 30.6 ML/M2
ECHO LV EDV NDEX A2C: 29 ML/M2
ECHO LV EJECTION FRACTION A2C: 47 PERCENT
ECHO LV EJECTION FRACTION A4C: 63 PERCENT
ECHO LV EJECTION FRACTION BIPLANE: 55.6 PERCENT (ref 55–100)
ECHO LV ESV A2C: 34.24 ML
ECHO LV ESV A4C: 26.12 ML
ECHO LV ESV BP: 30.15 ML (ref 19–49)
ECHO LV ESV INDEX A2C: 15.4 ML/M2
ECHO LV ESV INDEX A4C: 11.8 ML/M2
ECHO LV ESV INDEX BP: 13.6 ML/M2
ECHO LV INTERNAL DIMENSION DIASTOLIC: 4.67 CM (ref 3.9–5.3)
ECHO LV INTERNAL DIMENSION SYSTOLIC: 3.31 CM
ECHO LV IVSD: 1 CM (ref 0.6–0.9)
ECHO LV MASS 2D: 177.5 G (ref 67–162)
ECHO LV MASS INDEX 2D: 79.9 G/M2 (ref 43–95)
ECHO LV POSTERIOR WALL DIASTOLIC: 1.13 CM (ref 0.6–0.9)
ECHO LVOT DIAM: 1.98 CM
ECHO LVOT PEAK GRADIENT: 4.36 MMHG
ECHO LVOT PEAK VELOCITY: 104.39 CM/S
ECHO LVOT SV: 56.6 ML
ECHO LVOT VTI: 18.34 CM
ECHO MV A VELOCITY: 41.42 CM/S
ECHO MV E DECELERATION TIME (DT): 275.67 MS
ECHO MV E VELOCITY: 63.69 CM/S
ECHO MV E/A RATIO: 1.54
ECHO MV E/E' LATERAL: 4.31
ECHO MV E/E' RATIO (AVERAGED): 5.28
ECHO MV E/E' SEPTAL: 6.24
ECHO PV MAX VELOCITY: 96.38 CM/S
ECHO PV PEAK INSTANTANEOUS GRADIENT SYSTOLIC: 3.72 MMHG
ECHO RA MINOR AXIS: 3.96 CM
ECHO RV INTERNAL DIMENSION: 3.43 CM
ECHO TV REGURGITANT MAX VELOCITY: 269.5 CM/S
ECHO TV REGURGITANT PEAK GRADIENT: 29.05 MMHG
LA VOL DISK BP: 43.19 ML (ref 22–52)

## 2021-07-29 PROCEDURE — 93306 TTE W/DOPPLER COMPLETE: CPT | Performed by: SPECIALIST

## 2021-07-29 PROCEDURE — 99214 OFFICE O/P EST MOD 30 MIN: CPT | Performed by: SPECIALIST

## 2021-07-29 NOTE — PROGRESS NOTES
HISTORY OF PRESENT ILLNESS  Zahida Mcleod is a 39 y.o. female. She has palpitations and a family history of hypertrophic cardiomyopathy in her father. She returned today for an echocardiogram that was essentially normal.  I ordered a 30-day monitor but she only work for a week due to a severe rash on her chest where the monitor was attached. She ordered new adhesive and this just arrived today. She wore the monitor for a week and a day after she took it off she states that she had another passing out spell. She felt lightheaded and sat down on the couch and her head may have gone back with her passing out. She wonders if it could be due to anxiety as she has a history of anxiety. I did review transmissions from the week that she wore the monitor and there were only rare premature ventricular contractions noted  HPI  Patient Active Problem List   Diagnosis Code    Morbid [facility] of 40.0-44.9, [facility] E66.01, Z68.41    Palpitations R00.2    Mild intermittent asthma without complication Y17.40    PCOS (polycystic ovarian syndrome) E28.2    [facility] E66.01    Appendicitis K37     Current Outpatient Medications   Medication Sig Dispense Refill    trimethoprim-sulfamethoxazole (Bactrim)  mg per tablet Take 1 Tablet by mouth two (2) times a day.  vitamin e (E GEMS) 100 unit capsule Take 100 Units by mouth daily.  sertraline (Zoloft) 50 mg tablet Take  by mouth daily.  cyanocobalamin (VITAMIN B-12) 1,000 mcg sublingual tablet Take 1,000 mcg by mouth daily.  ergocalciferol (ERGOCALCIFEROL) 50,000 unit capsule Take 1 Cap by mouth every seven (7) days. Indications: VITAMIN D DEFICIENCY (HIGH DOSE THERAPY) 4 Cap 2    dextroamphetamine-amphetamine (ADDERALL) 20 mg tablet Take 20 mg by mouth daily.  hyoscyamine SL (LEVSIN/SL) 0.125 mg SL tablet 1 Tab by SubLINGual route every four (4) hours as needed for Cramping.  30 Tab 0    multivitamin (ONE A DAY) tablet Take 1 Tab by mouth daily.  naltrexone (DEPADE) 50 mg tablet Take 50 mg by mouth daily. (Patient not taking: Reported on 6/30/2021)      LORazepam (ATIVAN) 0.5 mg tablet Take 1 Tab by mouth every eight (8) hours as needed for Anxiety. Max Daily Amount: 1.5 mg. (Patient not taking: Reported on 6/30/2021) 15 Tab 0    busPIRone (BUSPAR) 10 mg tablet Take 10 mg by mouth three (3) times daily. (Patient not taking: Reported on 6/30/2021)      Omeprazole delayed release (PRILOSEC D/R) 20 mg tablet Take 1 Tab by mouth daily. (Patient not taking: Reported on 6/30/2021) 30 Tab 5     Past Medical History:   Diagnosis Date    Anxiety     Asthma     ATHLETIC INDUCED ASTHMA    Depression     Morbid obesity ([address]     PCOS (polycystic ovarian syndrome)      Past Surgical History:   Procedure Laterality Date    HX APPEN[facility]OMY  [facility]/31/2018    [facility] Cristobal [facility]    HX GASTRECTOMY  05/25/2017    laparoscopic sleeve gastrectomy    HX HEENT  1985    tubes in ears as child    HX HEENT  1998    wisdom teeth removed    HX OTHER SURGICAL Bilateral 2011    Liposuction        Review of Systems   Cardiovascular: Positive for palpitations. Neurological: Positive for loss of consciousness. All other systems reviewed and are negative. Visit Vitals  BP (!) 120/90   Pulse 92   Resp 16   Ht 5' 9\" (1.753 m)   Wt 237 lb (107.5 kg)   SpO2 98%   BMI 35.00 kg/m²       Physical Exam  Vitals and nursing note reviewed. Constitutional:       Appearance: She is obese. HENT:      Head: Normocephalic. Right Ear: External ear normal.      Left Ear: External ear normal.      Nose: Nose normal.      Mouth/Throat:      Mouth: Mucous membranes are moist.   Eyes:      Extraocular Movements: Extraocular movements intact. Cardiovascular:      Rate and Rhythm: Normal rate and regular rhythm. Heart sounds: Normal heart sounds. Pulmonary:      Breath sounds: Normal breath sounds.    Abdominal:      Palpations: Abdomen is soft. Musculoskeletal:         General: Normal range of motion. Cervical back: Normal range of motion. Skin:     General: Skin is warm. Neurological:      General: No focal deficit present. Mental Status: She is alert and oriented to person, place, and time. Psychiatric:         Mood and Affect: Mood normal.         ASSESSMENT and PLAN  I do suspect that anxiety is playing a large role in her symptoms. I reassured her that at this point I cannot find any significant cardiac issues. Monitor her for the remainder 3 weeks and we will call her regarding final result and any potential need for follow-up.

## 2021-08-02 ENCOUNTER — TELEPHONE (OUTPATIENT)
Dept: CARDIOLOGY CLINIC | Age: 41
End: 2021-08-02

## 2021-08-02 ENCOUNTER — PATIENT MESSAGE (OUTPATIENT)
Dept: CARDIOLOGY CLINIC | Age: 41
End: 2021-08-02

## 2021-08-23 ENCOUNTER — TELEPHONE (OUTPATIENT)
Dept: CARDIOLOGY CLINIC | Age: 41
End: 2021-08-23

## 2021-08-23 NOTE — TELEPHONE ENCOUNTER
Dr. Cheyanne Garcia-    Patient's end of service event monitor report is on your desk for review. No future appointments.

## 2021-08-24 ENCOUNTER — PATIENT MESSAGE (OUTPATIENT)
Dept: CARDIOLOGY CLINIC | Age: 41
End: 2021-08-24

## 2021-08-24 NOTE — TELEPHONE ENCOUNTER
Patient informed of results by [employer] message    Faxed her completed forms for disability also.

## 2022-03-17 ENCOUNTER — TELEPHONE (OUTPATIENT)
Dept: SURGERY | Age: 42
End: 2022-03-17

## 2022-03-18 PROBLEM — E28.2 PCOS (POLYCYSTIC OVARIAN SYNDROME): Status: ACTIVE | Noted: 2017-03-10

## 2022-03-18 PROBLEM — E66.01 MORBID OBESITY (HCC): Status: ACTIVE | Noted: 2017-05-25

## 2022-03-18 PROBLEM — J45.20 MILD INTERMITTENT ASTHMA WITHOUT COMPLICATION: Status: ACTIVE | Noted: 2017-03-10

## 2022-03-19 PROBLEM — R00.2 PALPITATIONS: Status: ACTIVE | Noted: 2017-01-30

## 2022-03-19 PROBLEM — K37 APPENDICITIS: Status: ACTIVE | Noted: 2018-09-01

## 2023-03-15 ENCOUNTER — TELEPHONE (OUTPATIENT)
Dept: SURGERY | Age: 43
End: 2023-03-15

## 2023-05-16 RX ORDER — EVENING PRIMROSE OIL 500 MG
100 CAPSULE ORAL DAILY
COMMUNITY

## 2023-05-16 RX ORDER — BUSPIRONE HYDROCHLORIDE 10 MG/1
10 TABLET ORAL 3 TIMES DAILY
COMMUNITY

## 2023-05-16 RX ORDER — ERGOCALCIFEROL 1.25 MG/1
50000 CAPSULE ORAL
COMMUNITY
Start: 2017-10-03

## 2023-05-16 RX ORDER — NALTREXONE HYDROCHLORIDE 50 MG/1
50 TABLET, FILM COATED ORAL DAILY
COMMUNITY

## 2023-05-16 RX ORDER — HYOSCYAMINE SULFATE 0.12 MG/1
0.12 TABLET SUBLINGUAL EVERY 4 HOURS PRN
COMMUNITY
Start: 2017-05-09

## 2023-05-16 RX ORDER — LORAZEPAM 0.5 MG/1
0.5 TABLET ORAL EVERY 8 HOURS PRN
COMMUNITY
Start: 2017-05-26

## 2023-05-16 RX ORDER — OMEPRAZOLE 20 MG/1
20 TABLET, DELAYED RELEASE ORAL DAILY
COMMUNITY
Start: 2017-05-09

## 2023-05-16 RX ORDER — SULFAMETHOXAZOLE AND TRIMETHOPRIM 400; 80 MG/1; MG/1
1 TABLET ORAL 2 TIMES DAILY
COMMUNITY

## 2023-05-16 RX ORDER — DEXTROAMPHETAMINE SACCHARATE, AMPHETAMINE ASPARTATE, DEXTROAMPHETAMINE SULFATE AND AMPHETAMINE SULFATE 5; 5; 5; 5 MG/1; MG/1; MG/1; MG/1
20 TABLET ORAL DAILY
COMMUNITY

## 2024-03-21 ENCOUNTER — TELEPHONE (OUTPATIENT)
Age: 44
End: 2024-03-21

## 2025-03-18 ENCOUNTER — TELEPHONE (OUTPATIENT)
Age: 45
End: 2025-03-18

## 2025-03-18 NOTE — TELEPHONE ENCOUNTER
LM for pt to call and schedule annual bariatric exam.  [employer] message also sent. Letter sent. [facility]

## (undated) DEVICE — KENDALL SCD EXPRESS SLEEVES, KNEE LENGTH, MEDIUM: Brand: KENDALL SCD

## (undated) DEVICE — DEVICE SUT 0 L48IN GRN POLY BRAID LD UNIT DISP SURGDAC

## (undated) DEVICE — BLADELESS TROCAR WITH FIXATION CANNULA: Brand: VERSAPORT PLUS

## (undated) DEVICE — SUTURE MCRYL SZ 4-0 L27IN ABSRB UD L19MM PS-2 1/2 CIR PRIM Y426H

## (undated) DEVICE — BNDG ADHESIVE FABRIC 2X3.5IN -- CONVERT TO ITEM 357955

## (undated) DEVICE — UNIVERSAL FIXATION CANNULA: Brand: VERSAONE

## (undated) DEVICE — SURGICAL PROCEDURE KIT GEN LAPAROSCOPY LF

## (undated) DEVICE — INSUFFLATION NEEDLE: Brand: SURGINEEDLE

## (undated) DEVICE — CATH URETH FOL 2W SH 14FRX5ML -- CONVERT TO ITEM 363071

## (undated) DEVICE — (D)PREP SKN CHLRAPRP APPL 26ML -- CONVERT TO ITEM 371833

## (undated) DEVICE — Device

## (undated) DEVICE — NEEDLE HYPO 25GA L1.5IN BVL ORIENTED ECLIPSE

## (undated) DEVICE — SUTURE SZ 0 27IN 5/8 CIR UR-6  TAPER PT VIOLET ABSRB VICRYL J603H

## (undated) DEVICE — SOLUTION IRRIG 1000ML H2O STRL BLT

## (undated) DEVICE — E-Z CLEAN, PTFE COATED, ELECTROSURGICAL LAPAROSCOPIC ELECTRODE, L-HOOK, 33 CM., SINGLE-USE, FOR USE WITH HAND CONTROL PENCIL: Brand: MEGADYNE

## (undated) DEVICE — SOLUTION IV 1000ML 0.9% SOD CHL

## (undated) DEVICE — FILTER SMK EVAC FLO CLR MEGADYNE

## (undated) DEVICE — (D)STRIP SKN CLSR 0.5X4IN WHT --

## (undated) DEVICE — BLADELESS OPTICAL TROCAR WITH FIXATION CANNULA: Brand: VERSAPORT

## (undated) DEVICE — MARYLAND JAW LAPAROSCOPIC SEALER/DIVIDER: Brand: LIGASURE

## (undated) DEVICE — CORDLESS ULTRASONIC DISSECTOR: Brand: SONICISION

## (undated) DEVICE — REM POLYHESIVE ADULT PATIENT RETURN ELECTRODE: Brand: VALLEYLAB

## (undated) DEVICE — ARTICULATION RELOAD WITH TRI-STAPLE TECHNOLOGY: Brand: ENDO GIA

## (undated) DEVICE — STERILE POLYISOPRENE POWDER-FREE SURGICAL GLOVES WITH EMOLLIENT COATING: Brand: PROTEXIS

## (undated) DEVICE — 1200 GUARD II KIT W/5MM TUBE W/O VAC TUBE: Brand: GUARDIAN

## (undated) DEVICE — 3M™ TEGADERM™ TRANSPARENT FILM DRESSING FRAME STYLE, 1626W, 4 IN X 4-3/4 IN (10 CM X 12 CM), 50/CT 4CT/CASE: Brand: 3M™ TEGADERM™

## (undated) DEVICE — 39" SINGLE PATIENT USE HOVERMATT BREATHABLE: Brand: SINGLE PATIENT USE HOVERMATT

## (undated) DEVICE — DEVON™ KNEE AND BODY STRAP 60" X 3" (1.5 M X 7.6 CM): Brand: DEVON

## (undated) DEVICE — INFECTION CONTROL KIT SYS

## (undated) DEVICE — TUBING INSUFLTN 10FT LUER -- CONVERT TO ITEM 368568

## (undated) DEVICE — SUTURING DEVICE: Brand: ENDO STITCH

## (undated) DEVICE — CLICKLINE SCISSORS INSERT: Brand: CLICKLINE

## (undated) DEVICE — SPECIMEN RETRIEVAL POUCH: Brand: ENDO CATCH GOLD

## (undated) DEVICE — BLACK INTELLIGENT RELOAD: Brand: TRI-STAPLE 2.0

## (undated) DEVICE — ARTICULATING RELOAD WITH TRI-STAPLE TECHNOLOGY: Brand: ENDO GIA

## (undated) DEVICE — MEDI-VAC NON-CONDUCTIVE SUCTION TUBING: Brand: CARDINAL HEALTH

## (undated) DEVICE — SUT ETHLN 2-0 18IN FS BLK --

## (undated) DEVICE — DRAIN CHN 19FR L0.25MM DIA6.3MM SIL RND HUBLESS FULL FLUT

## (undated) DEVICE — 3000CC GUARDIAN II: Brand: GUARDIAN

## (undated) DEVICE — TROCAR SITE CLOSURE DEVICE: Brand: ENDO CLOSE

## (undated) DEVICE — GOWN,SIRUS,NONRNF,SETINSLV,2XL,18/CS: Brand: MEDLINE

## (undated) DEVICE — DERMABOND SKIN ADH 0.7ML -- DERMABOND ADVANCED 12/BX

## (undated) DEVICE — GAUZE SPONGES,8 PLY: Brand: CURITY

## (undated) DEVICE — TRAY CATH 16F DRN BG LTX -- CONVERT TO ITEM 363158

## (undated) DEVICE — NEEDLE HYPO 22GA L1.5IN BLK S STL HUB POLYPR SHLD REG BVL

## (undated) DEVICE — VISIGI 3D®  CALIBRATION SYSTEM  SIZE 40FR STD W/ BULB: Brand: BOEHRINGER® VISIGI 3D™ SLEEVE GASTRECTOMY CALIBRATION SYSTEM, SIZE 40FR W/BULB

## (undated) DEVICE — VISUALIZATION SYSTEM: Brand: CLEARIFY

## (undated) DEVICE — Z INACTIVE USE 2240337 DRAPE SURG PT TRANSFER TRAWAY SHT